# Patient Record
Sex: FEMALE | Race: BLACK OR AFRICAN AMERICAN | Employment: OTHER | URBAN - METROPOLITAN AREA
[De-identification: names, ages, dates, MRNs, and addresses within clinical notes are randomized per-mention and may not be internally consistent; named-entity substitution may affect disease eponyms.]

---

## 2017-01-13 ENCOUNTER — OFFICE VISIT (OUTPATIENT)
Dept: INTERNAL MEDICINE CLINIC | Age: 67
End: 2017-01-13

## 2017-01-13 VITALS
HEART RATE: 61 BPM | WEIGHT: 169 LBS | RESPIRATION RATE: 16 BRPM | SYSTOLIC BLOOD PRESSURE: 128 MMHG | BODY MASS INDEX: 31.91 KG/M2 | OXYGEN SATURATION: 98 % | TEMPERATURE: 97.4 F | HEIGHT: 61 IN | DIASTOLIC BLOOD PRESSURE: 76 MMHG

## 2017-01-13 DIAGNOSIS — M17.12 ARTHRITIS OF KNEE, LEFT: Primary | ICD-10-CM

## 2017-01-13 NOTE — MR AVS SNAPSHOT
Visit Information Date & Time Provider Department Dept. Phone Encounter #  
 1/13/2017 10:00 AM [facility] [phone] [id number] Your Appointments 3/7/2017  1:00 PM  
COMPLETE 40 with 3283 MD Tonio [facility] ([address]) Appt Note: cp  
 [address], [address] [address]  
[address], [address] [place] [address]  
  
    
 6/8/2017  1:30 PM  
Follow Up with Paola Kessler  [facility]) Appt Note: Breast Ca, 6 month f/u  
 [address] [address]  
[phone]  
  
   
 [address] [address] Upcoming Health Maintenance Date Due Hepatitis C Screening 1950 FOBT Q 1 YEAR AGE 50-75 10/5/2001 ZOSTER VACCINE AGE 60> 10/5/2011 GLAUCOMA SCREENING Q2Y 10/5/2016 OSTEOPOROSIS SCREENING (DEXA) 10/5/2016 MEDICARE YEARLY EXAM 10/5/2016 BREAST CANCER SCRN MAMMOGRAM 5/9/2018 Pneumococcal 65+ High/Highest Risk (2 of 2 - PPSV23) 10/4/2018 DTaP/Tdap/Td series (2 - Td) 10/4/2023 Allergies as of 1/13/2017  Review Complete On: 1/13/2017 By: Augustine Perla NP Severity Noted Reaction Type Reactions Penicillins High 05/01/2012    Hives Bactrim [Sulfamethoprim Ds] Low 05/08/2012    Rash Ciprofloxacin (Bulk) Low 05/08/2012    Rash Current Immunizations  Reviewed on 12/8/2016 Name Date Pneumococcal Vaccine (Unspecified Type) 10/4/2013 Td 11/19/2002 Tdap 10/4/2013 Not reviewed this visit You Were Diagnosed With   
  
 Codes Comments Arthritis of knee, left    -  Primary ICD-10-CM: M19.90 ICD-9-CM: 716.96 Vitals BP Pulse Temp Resp Height(growth percentile) Weight(growth percentile)  128/76 (BP 1 Location: Left arm, BP Patient Position: Sitting) 61 97.4 °F (36.3 °C) (Oral) 16 5' 1\" (1.549 m) 169 lb (76.7 kg) SpO2 BMI OB Status Smoking Status 98% 31.93 kg/m2 Hysterectomy Never Smoker Vitals History BMI and BSA Data Body Mass Index Body Surface Area  
 31.93 kg/m 2 1.82 m 2 Preferred Pharmacy Pharmacy Name Phone [address] [place] [address], [facility] [phone] Your Updated Medication List  
  
   
This list is accurate as of: 1/13/17 11:03 AM.  Always use your most recent med list.  
  
  
  
  
 albuterol 90 mcg/actuation inhaler Commonly known as:  PROVENTIL HFA, VENTOLIN HFA, PROAIR HFA Take 2 Puffs by inhalation every six (6) hours as needed for Wheezing or Shortness of Breath (or tightness in chest). amLODIPine-benazepril 5-20 mg per capsule Commonly known as:  [place] Take 1 Cap by mouth daily. aspirin 325 mg tablet Commonly known as:  ASPIRIN Take 1 Tab by mouth daily. atenolol 50 mg tablet Commonly known as:  TENORMIN Take 1 Tab by mouth daily. cholecalciferol (vitamin D3) 2,000 unit Tab Take  by mouth.  
  
 coenzyme q10 200 mg Wafr Take 300 mg by mouth daily. PENNSAID 1.5 % Drop Generic drug:  diclofenac sodium  
by Apply Externally route. pravastatin 80 mg tablet Commonly known as:  PRAVACHOL Take 1 Tab by mouth nightly. VITAMIN C 500 mg tablet Generic drug:  ascorbic acid (vitamin C) Take 500 mg by mouth daily. ZyrTEC 10 mg tablet Generic drug:  cetirizine Take  by mouth. Patient Instructions Add Glucosamine chondroitin pills - try this daily for a month to see if it helps with your arthritic pain Wear the knee brace from time you get up until you get ready to go to bed Space Aleve 2 hrs away from Aspirin 
_____________________________________________________ Knee Arthritis: Exercises Your Care Instructions Here are some examples of exercises for knee arthritis. Start each exercise slowly. Ease off the exercise if you start to have pain. Your doctor or physical therapist will tell you when you can start these exercises and which ones will work best for you. How to do the exercises Knee flexion with heel slide 1. Lie on your back with your knees bent. 2. Slide your heel back by bending your affected knee as far as you can. Then hook your other foot around your ankle to help pull your heel even farther back. 3. Hold for about 6 seconds, then rest for up to 10 seconds. 4. Repeat 8 to 12 times. 5. Switch legs and repeat steps 1 through 4, even if only one knee is sore. [facility] 1. Sit with your affected leg straight and supported on the floor or a firm bed. Place a small, rolled-up towel under your knee. Your other leg should be bent, with that foot flat on the floor. 2. Tighten the thigh muscles of your affected leg by pressing the back of your knee down into the towel. 3. Hold for about 6 seconds, then rest for up to 10 seconds. 4. Repeat 8 to 12 times. 5. Switch legs and repeat steps 1 through 4, even if only one knee is sore. Straight-leg raises to the front 1. Lie on your back with your good knee bent so that your foot rests flat on the floor. Your affected leg should be straight. Make sure that your low back has a normal curve. You should be able to slip your hand in between the floor and the small of your back, with your palm touching the floor and your back touching the back of your hand. 2. Tighten the thigh muscles in your affected leg by pressing the back of your knee flat down to the floor. Hold your knee straight. 3. Keeping the thigh muscles tight and your leg straight, lift your affected leg up so that your heel is about 12 inches off the floor. Hold for about 6 seconds, then lower slowly. 4. Relax for up to 10 seconds between repetitions. 5. Repeat 8 to 12 times. 6. Switch legs and repeat steps 1 through 5, even if only one knee is sore. Active knee flexion 1. Lie on your stomach with your knees straight. If your kneecap is uncomfortable, roll up a washcloth and put it under your leg just above your kneecap. 2. Lift the foot of your affected leg by bending the knee so that you bring the foot up toward your buttock. If this motion hurts, try it without bending your knee quite as far. This may help you avoid any painful motion. 3. Slowly move your leg up and down. 4. Repeat 8 to 12 times. 5. Switch legs and repeat steps 1 through 4, even if only one knee is sore. Quadriceps stretch (facedown) 1. Lie flat on your stomach, and rest your face on the floor. 2. Wrap a towel or belt strap around the lower part of your affected leg. Then use the towel or belt strap to slowly pull your heel toward your buttock until you feel a stretch. 3. Hold for about 15 to 30 seconds, then relax your leg against the towel or belt strap. 4. Repeat 2 to 4 times. 5. Switch legs and repeat steps 1 through 4, even if only one knee is sore. Stationary exercise bike If you do not have a stationary exercise bike at home, you can find one to ride at your local health club or community center. 1. Adjust the height of the bike seat so that your knee is slightly bent when your leg is extended downward. If your knee hurts when the pedal reaches the top, you can raise the seat so that your knee does not bend as much. 2. Start slowly. At first, try to do 5 to 10 minutes of cycling with little to no resistance. Then increase your time and the resistance bit by bit until you can do 20 to 30 minutes without pain. 3. If you start to have pain, rest your knee until your pain gets back to the level that is normal for you. Or cycle for less time or with less effort. Follow-up care is a key part of your treatment and safety.  Be sure to make and go to all appointments, and call your doctor if you are having problems. It's also a good idea to know your test results and keep a list of the medicines you take. Where can you learn more? Go to http://natalie-jeet.info/. Enter C159 in the search box to learn more about \"Knee Arthritis: Exercises. \" Current as of: May 23, 2016 Content Version: 11.1 © 1212-6494 [employer]. Care instructions adapted under license by [employer] (which disclaims liability or warranty for this information). If you have questions about a medical condition or this instruction, always ask your healthcare professional. [address] any warranty or liability for your use of this information. Introducing [facility]! Dear Demetra Matta: 
Thank you for requesting a Curiyo account. Our records indicate that you already have an active Curiyo account. You can access your account anytime at [email or web address]. [email or web address] Did you know that you can access your hospital and ER discharge instructions at any time in Curiyo? You can also review all of your test results from your hospital stay or ER visit. Additional Information If you have questions, please visit the Frequently Asked Questions section of the Curiyo website at [email or web address] Remember, Curiyo is NOT to be used for urgent needs. For medical emergencies, dial 911. Now available from your iPhone and Android! Please provide this summary of care documentation to your next provider. Your primary care clinician is listed as [address]. If you have any questions after today's visit, please call [phone].

## 2017-01-13 NOTE — PATIENT INSTRUCTIONS
Add Glucosamine chondroitin pills - try this daily for a month to see if it helps with your arthritic pain    Wear the knee brace from time you get up until you get ready to go to bed    Space Aleve 2 hrs away from Aspirin  _____________________________________________________     Knee Arthritis: Exercises  Your Care Instructions  Here are some examples of exercises for knee arthritis. Start each exercise slowly. Ease off the exercise if you start to have pain. Your doctor or physical therapist will tell you when you can start these exercises and which ones will work best for you. How to do the exercises  Knee flexion with heel slide    1. Lie on your back with your knees bent. 2. Slide your heel back by bending your affected knee as far as you can. Then hook your other foot around your ankle to help pull your heel even farther back. 3. Hold for about 6 seconds, then rest for up to 10 seconds. 4. Repeat 8 to 12 times. 5. Switch legs and repeat steps 1 through 4, even if only one knee is sore. Quad sets    1. Sit with your affected leg straight and supported on the floor or a firm bed. Place a small, rolled-up towel under your knee. Your other leg should be bent, with that foot flat on the floor. 2. Tighten the thigh muscles of your affected leg by pressing the back of your knee down into the towel. 3. Hold for about 6 seconds, then rest for up to 10 seconds. 4. Repeat 8 to 12 times. 5. Switch legs and repeat steps 1 through 4, even if only one knee is sore. Straight-leg raises to the front    1. Lie on your back with your good knee bent so that your foot rests flat on the floor. Your affected leg should be straight. Make sure that your low back has a normal curve. You should be able to slip your hand in between the floor and the small of your back, with your palm touching the floor and your back touching the back of your hand.   2. Tighten the thigh muscles in your affected leg by pressing the back of your knee flat down to the floor. Hold your knee straight. 3. Keeping the thigh muscles tight and your leg straight, lift your affected leg up so that your heel is about 12 inches off the floor. Hold for about 6 seconds, then lower slowly. 4. Relax for up to 10 seconds between repetitions. 5. Repeat 8 to 12 times. 6. Switch legs and repeat steps 1 through 5, even if only one knee is sore. Active knee flexion    1. Lie on your stomach with your knees straight. If your kneecap is uncomfortable, roll up a washcloth and put it under your leg just above your kneecap. 2. Lift the foot of your affected leg by bending the knee so that you bring the foot up toward your buttock. If this motion hurts, try it without bending your knee quite as far. This may help you avoid any painful motion. 3. Slowly move your leg up and down. 4. Repeat 8 to 12 times. 5. Switch legs and repeat steps 1 through 4, even if only one knee is sore. Quadriceps stretch (facedown)    1. Lie flat on your stomach, and rest your face on the floor. 2. Wrap a towel or belt strap around the lower part of your affected leg. Then use the towel or belt strap to slowly pull your heel toward your buttock until you feel a stretch. 3. Hold for about 15 to 30 seconds, then relax your leg against the towel or belt strap. 4. Repeat 2 to 4 times. 5. Switch legs and repeat steps 1 through 4, even if only one knee is sore. Stationary exercise bike    If you do not have a stationary exercise bike at home, you can find one to ride at your local health club or community center. 1. Adjust the height of the bike seat so that your knee is slightly bent when your leg is extended downward. If your knee hurts when the pedal reaches the top, you can raise the seat so that your knee does not bend as much. 2. Start slowly. At first, try to do 5 to 10 minutes of cycling with little to no resistance.  Then increase your time and the resistance bit by bit until you can do 20 to 30 minutes without pain. 3. If you start to have pain, rest your knee until your pain gets back to the level that is normal for you. Or cycle for less time or with less effort. Follow-up care is a key part of your treatment and safety. Be sure to make and go to all appointments, and call your doctor if you are having problems. It's also a good idea to know your test results and keep a list of the medicines you take. Where can you learn more? Go to http://natalie-jeet.info/. Enter C159 in the search box to learn more about \"Knee Arthritis: Exercises. \"  Current as of: May 23, 2016  Content Version: 11.1  © 8222-4868 [employer]. Care instructions adapted under license by [employer] (which disclaims liability or warranty for this information). If you have questions about a medical condition or this instruction, always ask your healthcare professional. [address] any warranty or liability for your use of this information.

## 2017-01-13 NOTE — PROGRESS NOTES
72 yo female in to discuss her chronic L knee pain which is present 5 days/wk. Sleep is fairly un-interrupted by pain most of the time. She has tried to take Aleve, but has trouble remembering to take it twice a day. She is interested in other things she can do for this knee pain. She would like to have a disabled parking pass renewal.    A L Knee XR was done at [facility] and she took this to Dr. Inder Fung, Orthopedist.  He gave her a Cortisone injection in that knee in 12/2015 with no benefit. He mentioned knee replacement (partial), but she doesn't wish to do this. The elastic knee brace doesn't seem to offer much help, although she didn't leave it on for long. PE: WNWD BF   BP - 128/76   L Knee - crepitus w/ passive ROM; tenderness along medial joint line and along lateral patella    Imp: DJD L Knee     Plan: Suggested Glucosamine   Wear knee brace   Space Aleve away from Aspirin by 2 hours   Educated on chronic use of NSAIDs   Handicapped parking permit   See Dr. Isiah Ortiz as scheduled in March  _________________________________  Expected course of current diagnosed problem(s) as well as expected progression and possible complications, and desired follow up with provider are discussed with patient. Patient is encouraged to be back in touch with any questions or concerns. Patient expresses understanding of plan of care. Patient is given AVS which includes diagnoses, current medications, vitals.

## 2017-03-07 ENCOUNTER — OFFICE VISIT (OUTPATIENT)
Dept: INTERNAL MEDICINE CLINIC | Age: 67
End: 2017-03-07

## 2017-03-07 VITALS
DIASTOLIC BLOOD PRESSURE: 80 MMHG | RESPIRATION RATE: 16 BRPM | WEIGHT: 166 LBS | HEART RATE: 63 BPM | TEMPERATURE: 98.3 F | BODY MASS INDEX: 31.34 KG/M2 | OXYGEN SATURATION: 98 % | HEIGHT: 61 IN | SYSTOLIC BLOOD PRESSURE: 128 MMHG

## 2017-03-07 DIAGNOSIS — I25.10 CORONARY ARTERY DISEASE INVOLVING NATIVE CORONARY ARTERY OF NATIVE HEART WITHOUT ANGINA PECTORIS: ICD-10-CM

## 2017-03-07 DIAGNOSIS — Z71.89 ACP (ADVANCE CARE PLANNING): ICD-10-CM

## 2017-03-07 DIAGNOSIS — E78.00 HYPERCHOLESTEREMIA: ICD-10-CM

## 2017-03-07 DIAGNOSIS — Z11.59 NEED FOR HEPATITIS C SCREENING TEST: ICD-10-CM

## 2017-03-07 DIAGNOSIS — M54.50 LOW BACK PAIN WITHOUT SCIATICA, UNSPECIFIED BACK PAIN LATERALITY, UNSPECIFIED CHRONICITY: ICD-10-CM

## 2017-03-07 DIAGNOSIS — Z78.0 POSTMENOPAUSAL STATUS (AGE-RELATED) (NATURAL): ICD-10-CM

## 2017-03-07 DIAGNOSIS — Z00.00 WELCOME TO MEDICARE PREVENTIVE VISIT: Primary | ICD-10-CM

## 2017-03-07 DIAGNOSIS — Z23 NEED FOR VACCINATION WITH 13-POLYVALENT PNEUMOCOCCAL CONJUGATE VACCINE: ICD-10-CM

## 2017-03-07 DIAGNOSIS — E55.9 VITAMIN D INSUFFICIENCY: ICD-10-CM

## 2017-03-07 LAB
BILIRUB UR QL STRIP: NEGATIVE
GLUCOSE UR-MCNC: NEGATIVE MG/DL
KETONES P FAST UR STRIP-MCNC: NEGATIVE MG/DL
PH UR STRIP: 6 [PH] (ref 4.6–8)
PROT UR QL STRIP: NEGATIVE MG/DL
SP GR UR STRIP: 1.02 (ref 1–1.03)
UA UROBILINOGEN AMB POC: NORMAL (ref 0.2–1)
URINALYSIS CLARITY POC: CLEAR
URINALYSIS COLOR POC: YELLOW
URINE BLOOD POC: NEGATIVE
URINE LEUKOCYTES POC: NORMAL
URINE NITRITES POC: NEGATIVE

## 2017-03-07 RX ORDER — ATENOLOL 50 MG/1
50 TABLET ORAL DAILY
Qty: 90 TAB | Refills: 3 | Status: SHIPPED | OUTPATIENT
Start: 2017-03-07 | End: 2017-05-09 | Stop reason: SDUPTHER

## 2017-03-07 RX ORDER — CALC/MAG/B COMPLEX/D3/HERB 61
15 TABLET ORAL AS NEEDED
COMMUNITY

## 2017-03-07 RX ORDER — AMLODIPINE AND BENAZEPRIL HYDROCHLORIDE 5; 20 MG/1; MG/1
1 CAPSULE ORAL DAILY
Qty: 90 CAP | Refills: 1 | Status: SHIPPED | OUTPATIENT
Start: 2017-03-07 | End: 2017-03-08

## 2017-03-07 NOTE — PROGRESS NOTES
HPI:  Montana Jesus is a 73y.o. year old female who returns to clinic today for an Annual Physical.  She was working at [employer], now at [employer]. Chronic OA left knee. Sees Dr. Kerrie Gautam and offered partial knee replacement. She hopes to avoid this. Came in in Jan to see Frankie Sanders and took a course of Aleve. osteobiflex has helped as well. Hypercholesterolemia - I increased her pravastatin from 40-->80 mg last March. Her back starting hurting a month ago and she attributed this to her cholesterol medication and therefore stopped it, she notes she restarted it for a week to 10 days, and then notes the return of the pain. htn - adherent to current regimen as prescribed. No interim issues with blood pressure. She does not exercise regularly due to knee pain. She had angioplasty at age 56 with no problems since. Has not seen cardiology in many years. heartburn - takes prevacid otc every other day. H2 blockers are not effective. Annual Wellness Visit- IPPE    End of Life Planning: This was discussed with her today and she  has NO advanced directive  - add't info provided. Reviewed DNR/DNI and patient is not interested. Depression Screen:  PHQ 2 / 9, over the last two weeks 3/7/2017   Little interest or pleasure in doing things Not at all   Feeling down, depressed or hopeless Not at all   Total Score PHQ 2 0         Fall Risk:   Fall Risk Assessment, last 12 mths 3/7/2017   Able to walk? Yes   Fall in past 12 months? No       Abuse Screen:  No flowsheet data found. Alcohol Risk Factor Screening: On any occasion during the past 3 months, have you had more than 3 drinks containing alcohol? No  Do you average more than 7 drinks per week? No    Hearing Loss:  denies any hearing loss    Activities of Daily Living:  Self-care. Requires assistance with: no ADLs    Adult Nutrition Screen:  No risk factors noted.       Health Maintenance  Daily Aspirin: yes - on  mg daily  Bone Density: not up to date - ordered initial study today  Glaucoma Screening: UTD - done last May with Dr. Caitlin Burton, records requested. Immunizations:     Influenza: patient declines, reviewed pros/cons to vaccination & recommended it. Tetanus: up to date. Shingles: patient declined previously to previous PCP. Pneumonia: due for Prevnar & script provided. Cancer screening:    Cervical: reviewed guidelines, she reports she does not have a cervix and had regular non cervical pap smears by previous pcp for < 10 years. Breast: reviewed guidelines, up to date, reviewed SBE. Colon: done Lulu 3 2013 (reported in previous pcp record, record from Dr. Jerald Tapia directly requested). Patient Care Team:  Pam Herron MD as PCP - General (Internal Medicine)  Kd Coffman DO as Physician (Oncology)  Erwin Boyd MD as Physician (Breast Surgery)  Joselin Mack MD as Physician (Radiation Oncology)  Toshia Bradford MD (Cardiology)  [facility] as Physician Assistant (Physician Assistant)  Devon Hennessy MD (Breast Surgery)       The following sections were reviewed & updated as appropriate: PMH, PSH, FH, and SH. Patient Active Problem List   Diagnosis Code    Breast cancer, stage 2 ([facility] [address] C50.919    Lymphedema of arm I89.0    S/P mastectomy Z90.10    Chest wall discomfort R07.89    Neuropathy G62.9    Essential hypertension I10    History of hyperthyroidism Z86.39    Hypercholesteremia E78.00    CAD (coronary artery disease), native coronary artery I25.10    Degenerative arthritis of left knee M17.9    History of colonoscopy Z98.890    Pain in right axilla M79.621    Rib pain on right side R07.81    Sacroiliac joint pain M53.3    Left knee pain M25.562    Arthritis of knee, left M19.90          Prior to Admission medications    Medication Sig Start Date End Date Taking?  Authorizing Provider   amLODIPine-benazepril (LOTREL) 5-20 mg per capsule Take 1 Cap by mouth daily. 3/3/17  Yes Ingrid Penn MD   cetirizine (ZYRTEC) 10 mg tablet Take  by mouth. Yes Historical Provider   atenolol (TENORMIN) 50 mg tablet Take 1 Tab by mouth daily. 4/21/16  Yes Ingrid Penn MD   albuterol (PROVENTIL HFA, VENTOLIN HFA, PROAIR HFA) 90 mcg/actuation inhaler Take 2 Puffs by inhalation every six (6) hours as needed for Wheezing or Shortness of Breath (or tightness in chest). 4/11/16  Yes Magui Ott NP   cholecalciferol, vitamin D3, 2,000 unit tab Take  by mouth. Yes Historical Provider   pravastatin (PRAVACHOL) 80 mg tablet Take 1 Tab by mouth nightly. 3/1/16  Yes Ingrid Penn MD   aspirin (ASPIRIN) 325 mg tablet Take 1 Tab by mouth daily. 6/20/12  Yes Jasmin Pena MD          Allergies   Allergen Reactions    Penicillins Hives    Bactrim [Sulfamethoprim Ds] Rash    Ciprofloxacin (Bulk) Rash              Review of Systems   Constitutional: Negative for malaise/fatigue. HENT: Negative for congestion and hearing loss. Eyes: Negative for blurred vision. Respiratory: Negative for cough and shortness of breath. Cardiovascular: Negative for chest pain, palpitations and leg swelling. Gastrointestinal: Positive for heartburn. Negative for abdominal pain, constipation, diarrhea, nausea and vomiting. Genitourinary: Negative for frequency, hematuria and urgency. Musculoskeletal: Positive for back pain (left mid back for 4 days) and joint pain (left knee). Negative for myalgias and neck pain. Neurological: Negative for tingling and sensory change. Endo/Heme/Allergies: Positive for environmental allergies. Psychiatric/Behavioral: Negative for depression. The patient is not nervous/anxious. Physical Exam   Constitutional: She appears well-nourished. Neck: Carotid bruit is not present. Cardiovascular: Normal rate, regular rhythm and normal heart sounds. No murmur heard.   Pulses:       Carotid pulses are 2+ on the right side, and 2+ on the left side. Pulmonary/Chest: Effort normal and breath sounds normal.   Abdominal: Soft. Bowel sounds are normal. There is no hepatosplenomegaly. There is no tenderness. Musculoskeletal: She exhibits no edema. Arms:          Visit Vitals    /80 (BP 1 Location: Left arm, BP Patient Position: Sitting)    Pulse 63    Temp 98.3 °F (36.8 °C) (Oral)    Resp 16    Ht 5' 1\" (1.549 m)    Wt 166 lb (75.3 kg)    SpO2 98%    BMI 31.37 kg/m2     ECG:  NSR with Q wave in leads III and aVF. No comparison on file. Results for orders placed or performed in visit on 03/07/17   AMB POC URINALYSIS DIP STICK AUTO W/O MICRO     Status: None   Result Value Ref Range Status    Color (UA POC) Yellow  Final    Clarity (UA POC) Clear  Final    Glucose (UA POC) Negative Negative Final    Bilirubin (UA POC) Negative Negative Final    Ketones (UA POC) Negative Negative Final    Specific gravity (UA POC) 1.020 1.001 - 1.035 Final    Blood (UA POC) Negative Negative Final    pH (UA POC) 6 4.6 - 8.0 Final    Protein (UA POC) Negative Negative mg/dL Final    Urobilinogen (UA POC) 0.2 mg/dL 0.2 - 1 Final    Nitrites (UA POC) Negative Negative Final    Leukocyte esterase (UA POC) 1+ Negative Final        Assessment & Plan:  Kary Brennan was seen today for complete physical and back pain. Diagnoses and all orders for this visit:    Welcome to Medicare preventive visit  -     AMB POC EKG Screen ([id number]) (Only Orderable in first 12 months of Medicare)  -     CBC W/O DIFF  -     METABOLIC PANEL, COMPREHENSIVE    ACP (advance care planning)    Postmenopausal status (age-related) (natural)  -     DEXA BONE DENSITY STUDY AXIAL; Future    Need for vaccination with 13-polyvalent pneumococcal conjugate vaccine  -     pneumococcal 13 matt conj dip (PREVNAR-13) 0.5 mL syrg injection; 0.5 mL by IntraMUSCular route once for 1 dose.     Need for hepatitis C screening test  -     HEPATITIS C AB    Also, she has additional complaints of low back pain, coronary disease, hypertension, hld, Vit D insufficiency. Low back pain without sciatica, unspecified back pain laterality, unspecified chronicity  Suspect msk in origin. She was concerned about her kidneys so a UA was done prior to appt, reassurance provided in this regard as I do not suspect kidney disease. I am not convinced of the statins relationship to her pain so I suggest she return to the 40 mg dose and reassess her lipids in 2 months. She will call me with any recurrent back problems.     -     AMB POC URINALYSIS DIP STICK AUTO W/O MICRO    Coronary artery disease involving native coronary artery of native heart without angina pectoris  She is clinically doing well and on a good medical regimen. I recommend she remain on a statin, recheck fasting lipids with goal LDL < 100.    -     LIPID PANEL; Future  -     atenolol (TENORMIN) 50 mg tablet; Take 1 Tab by mouth daily. Hypertension  I evaluated and recommended to continue current doses of medications pending lab work. Erika Witt notes there may be an issue with coverage with Lotrel, discussed possibility of needing to separate her medications to individual components. Hypercholesteremia  Recheck fasting lipids in 2 mo after return to regular use of statin at the 40 mg dose    Vitamin D insufficiency  Continue current dose of D pending repeat lab  -     VITAMIN D, 25 HYDROXY        Follow-up Disposition:  Return in about 6 months (around 9/7/2017) for Establish care with new provider for hypertension and coronary disease. Advised her to call back or return to office if symptoms worsen/change/persist.  Discussed expected course/resolution/complications of diagnosis in detail with patient. Medication risks/benefits/costs/interactions/alternatives discussed with patient. She was given an after visit summary which includes diagnoses, current medications, & vitals. She expressed understanding with the diagnosis and plan.

## 2017-03-07 NOTE — MR AVS SNAPSHOT
Visit Information Date & Time Provider Department Dept. Phone Encounter #  
 3/7/2017  1:00 PM Rajeev Dolan MD [place] [address] [facility] [phone] [id number] Follow-up Instructions Return in about 6 months (around 9/7/2017) for Establish care with new provider for hypertension and coronary disease. Your Appointments 6/8/2017  1:30 PM  
Follow Up with Aristides Lyons  [facility] at [facility] Appt Note: Breast Ca, 6 month f/u  
 [address] [address]  
[phone]  
  
   
 [address] [address] Upcoming Health Maintenance Date Due Hepatitis C Screening 1950 COLONOSCOPY 10/5/1969 ZOSTER VACCINE AGE 60> 10/5/2011 GLAUCOMA SCREENING Q2Y 10/5/2016 OSTEOPOROSIS SCREENING (DEXA) 10/5/2016 MEDICARE YEARLY EXAM 10/5/2016 BREAST CANCER SCRN MAMMOGRAM 5/9/2018 Pneumococcal 65+ High/Highest Risk (2 of 2 - PPSV23) 10/4/2018 DTaP/Tdap/Td series (2 - Td) 10/4/2023 Allergies as of 3/7/2017  Review Complete On: 3/7/2017 By: Rajeev Dolan MD  
  
 Severity Noted Reaction Type Reactions Penicillins High 05/01/2012    Hives Bactrim [Sulfamethoprim Ds] Low 05/08/2012    Rash Ciprofloxacin (Bulk) Low 05/08/2012    Rash Current Immunizations  Reviewed on 3/7/2017 Name Date Pneumococcal Polysaccharide (PPSV-23) 10/4/2013 Td 11/19/2002 Tdap 10/4/2013 Reviewed by Rajeev Dolan MD on 3/7/2017 at  1:43 PM  
 Reviewed by Rajeev Dolan MD on 3/7/2017 at  1:43 PM  
 Reviewed by Rajeev Dolan MD on 3/7/2017 at  1:44 PM  
You Were Diagnosed With   
  
 Codes Comments Welcome to Medicare preventive visit    -  Primary ICD-10-CM: Z00.00 ICD-9-CM: V70.0 ACP (advance care planning)     ICD-10-CM: Z71.89 ICD-9-CM: V65.49 Low back pain without sciatica, unspecified back pain laterality, unspecified chronicity     ICD-10-CM: M54.5 ICD-9-CM: 724.2 Postmenopausal status (age-related) (natural)     ICD-10-CM: Z78.0 ICD-9-CM: V49.81 Need for vaccination with 13-polyvalent pneumococcal conjugate vaccine     ICD-10-CM: Q36 ICD-9-CM: V03.82 Need for hepatitis C screening test     ICD-10-CM: Z11.59 
ICD-9-CM: V73.89 Coronary artery disease involving native coronary artery of native heart without angina pectoris     ICD-10-CM: I25.10 ICD-9-CM: 414.01 Vitamin D insufficiency     ICD-10-CM: E55.9 ICD-9-CM: 268.9 Vitals BP Pulse Temp Resp Height(growth percentile) Weight(growth percentile) 128/80 (BP 1 Location: Left arm, BP Patient Position: Sitting) 63 98.3 °F (36.8 °C) (Oral) 16 5' 1\" (1.549 m) 166 lb (75.3 kg) SpO2 BMI OB Status Smoking Status 98% 31.37 kg/m2 Hysterectomy Never Smoker BMI and BSA Data Body Mass Index Body Surface Area  
 31.37 kg/m 2 1.8 m 2 Preferred Pharmacy Pharmacy Name Phone [address] [facility] [phone] Your Updated Medication List  
  
   
This list is accurate as of: 3/7/17  2:24 PM.  Always use your most recent med list.  
  
  
  
  
 albuterol 90 mcg/actuation inhaler Commonly known as:  PROVENTIL HFA, VENTOLIN HFA, PROAIR HFA Take 2 Puffs by inhalation every six (6) hours as needed for Wheezing or Shortness of Breath (or tightness in chest). amLODIPine-benazepril 5-20 mg per capsule Commonly known as:  Gee Pauling Take 1 Cap by mouth daily. aspirin 325 mg tablet Commonly known as:  ASPIRIN Take 1 Tab by mouth daily. atenolol 50 mg tablet Commonly known as:  TENORMIN Take 1 Tab by mouth daily. cholecalciferol (vitamin D3) 2,000 unit Tab Take  by mouth. pneumococcal 13 matt conj dip 0.5 mL Syrg injection Commonly known as:  PREVNAR-13  
0.5 mL by IntraMUSCular route once for 1 dose. pravastatin 80 mg tablet Commonly known as:  PRAVACHOL Take 1 Tab by mouth nightly. PREVACID 15 mg capsule Generic drug:  lansoprazole Take 15 mg by mouth every other day. ZyrTEC 10 mg tablet Generic drug:  cetirizine Take  by mouth. Prescriptions Printed Refills  
 pneumococcal 13 matt conj dip (PREVNAR-13) 0.5 mL syrg injection 0 Si.5 mL by IntraMUSCular route once for 1 dose. Class: Print Route: IntraMUSCular  
 amLODIPine-benazepril (LOTREL) 5-20 mg per capsule 1 Sig: Take 1 Cap by mouth daily. Class: Print Route: Oral  
  
Prescriptions Sent to Pharmacy Refills  
 atenolol (TENORMIN) 50 mg tablet 3 Sig: Take 1 Tab by mouth daily. Class: Normal  
 Pharmacy: [address], [address] Ph #: [phone] Route: Oral  
  
We Performed the Following AMB POC EKG ROUTINE W/ 12 LEADS, SCREEN () [ HCP] AMB POC URINALYSIS DIP STICK AUTO W/O MICRO [47156 CPT(R)] CBC W/O DIFF [04486 CPT(R)] HEPATITIS C AB [07803 CPT(R)] METABOLIC PANEL, COMPREHENSIVE [79341 CPT(R)] VITAMIN D, 25 HYDROXY [id number] CPT(R)] Follow-up Instructions Return in about 6 months (around 2017) for Establish care with new provider for hypertension and coronary disease. To-Do List   
 2017 Imaging:  DEXA BONE DENSITY STUDY AXIAL   
  
 2017 Lab:  LIPID PANEL Introducing [facility]! Dear Kary Anu: 
Thank you for requesting a Awdio account. Our records indicate that you already have an active Awdio account. You can access your account anytime at [email or web address] Did you know that you can access your hospital and ER discharge instructions at any time in Twingly? You can also review all of your test results from your hospital stay or ER visit. Additional Information If you have questions, please visit the Frequently Asked Questions section of the Twingly website at [email or web address] [employer]/. Remember, Twingly is NOT to be used for urgent needs. For medical emergencies, dial 911. Now available from your iPhone and Android! Please provide this summary of care documentation to your next provider. Your primary care clinician is listed as Pam Herron. If you have any questions after today's visit, please call [phone].

## 2017-03-07 NOTE — PROGRESS NOTES
1. Have you been to the ER, urgent care clinic since your last visit? Hospitalized since your last visit? No    2. Have you seen or consulted any other health care providers outside of the [address] [place] [address] since your last visit? Include any pap smears or colon screening.  No  Chief Complaint   Patient presents with    Complete Physical    Back Pain

## 2017-03-08 LAB
25(OH)D3+25(OH)D2 SERPL-MCNC: 31.3 NG/ML (ref 30–100)
ALBUMIN SERPL-MCNC: 3.9 G/DL (ref 3.6–4.8)
ALBUMIN/GLOB SERPL: 1.2 {RATIO} (ref 1.1–2.5)
ALP SERPL-CCNC: 76 IU/L (ref 39–117)
ALT SERPL-CCNC: 6 IU/L (ref 0–32)
AST SERPL-CCNC: 14 IU/L (ref 0–40)
BILIRUB SERPL-MCNC: 0.3 MG/DL (ref 0–1.2)
BUN SERPL-MCNC: 9 MG/DL (ref 8–27)
BUN/CREAT SERPL: 15 (ref 11–26)
CALCIUM SERPL-MCNC: 9.2 MG/DL (ref 8.7–10.3)
CHLORIDE SERPL-SCNC: 100 MMOL/L (ref 96–106)
CO2 SERPL-SCNC: 24 MMOL/L (ref 18–29)
CREAT SERPL-MCNC: 0.61 MG/DL (ref 0.57–1)
ERYTHROCYTE [DISTWIDTH] IN BLOOD BY AUTOMATED COUNT: 14.6 % (ref 12.3–15.4)
GLOBULIN SER CALC-MCNC: 3.3 G/DL (ref 1.5–4.5)
GLUCOSE SERPL-MCNC: 85 MG/DL (ref 65–99)
HCT VFR BLD AUTO: 37.7 % (ref 34–46.6)
HCV AB S/CO SERPL IA: <0.1 S/CO RATIO (ref 0–0.9)
HGB BLD-MCNC: 12 G/DL (ref 11.1–15.9)
MCH RBC QN AUTO: 26.3 PG (ref 26.6–33)
MCHC RBC AUTO-ENTMCNC: 31.8 G/DL (ref 31.5–35.7)
MCV RBC AUTO: 83 FL (ref 79–97)
PLATELET # BLD AUTO: 289 X10E3/UL (ref 150–379)
POTASSIUM SERPL-SCNC: 4.1 MMOL/L (ref 3.5–5.2)
PROT SERPL-MCNC: 7.2 G/DL (ref 6–8.5)
RBC # BLD AUTO: 4.56 X10E6/UL (ref 3.77–5.28)
SODIUM SERPL-SCNC: 141 MMOL/L (ref 134–144)
WBC # BLD AUTO: 6.8 X10E3/UL (ref 3.4–10.8)

## 2017-03-08 RX ORDER — AMLODIPINE BESYLATE 5 MG/1
5 TABLET ORAL DAILY
Qty: 30 TAB | Refills: 11 | Status: SHIPPED | OUTPATIENT
Start: 2017-03-08 | End: 2017-04-05 | Stop reason: SDUPTHER

## 2017-03-08 RX ORDER — BENAZEPRIL HYDROCHLORIDE 20 MG/1
20 TABLET ORAL DAILY
Qty: 30 TAB | Refills: 11 | Status: SHIPPED | OUTPATIENT
Start: 2017-03-08 | End: 2017-04-05 | Stop reason: SDUPTHER

## 2017-03-08 NOTE — TELEPHONE ENCOUNTER
I discussed with her yesterday that the pills will need to be . I will send in the separate components to her pharmacy, if still too expensive - will change the benazepril. Which pharmacy does she want to use and does she want 30 or 90 days?

## 2017-03-08 NOTE — TELEPHONE ENCOUNTER
Pt said her insurance does not cover the generic for lotrel and the brand name is too expensive pt said she is out can you call in something else

## 2017-04-05 RX ORDER — AMLODIPINE BESYLATE 5 MG/1
5 TABLET ORAL DAILY
Qty: 90 TAB | Refills: 3 | Status: SHIPPED | OUTPATIENT
Start: 2017-04-05 | End: 2018-04-05 | Stop reason: SDUPTHER

## 2017-04-05 RX ORDER — BENAZEPRIL HYDROCHLORIDE 20 MG/1
20 TABLET ORAL DAILY
Qty: 90 TAB | Refills: 3 | Status: SHIPPED | OUTPATIENT
Start: 2017-04-05 | End: 2018-04-05 | Stop reason: SDUPTHER

## 2017-04-05 NOTE — TELEPHONE ENCOUNTER
----- Message from [facility] sent at 4/4/2017  5:17 PM EDT -----  Regarding: Dr. Liliam Forrester   Pt requesting a refill on \"amlodipine besylate\" 5mg and \" benazepril\" 20 mg to be sent to [address] Best contact number [phone].

## 2017-04-05 NOTE — TELEPHONE ENCOUNTER
Last office visit 3/7/17  No upcomming appointments on file    Early refill requested due to change in pharmacy

## 2017-05-01 DIAGNOSIS — I25.10 CORONARY ARTERY DISEASE INVOLVING NATIVE CORONARY ARTERY OF NATIVE HEART WITHOUT ANGINA PECTORIS: ICD-10-CM

## 2017-05-06 LAB
CHOLEST SERPL-MCNC: 155 MG/DL (ref 100–199)
HDLC SERPL-MCNC: 53 MG/DL
INTERPRETATION, 910389: NORMAL
LDLC SERPL CALC-MCNC: 93 MG/DL (ref 0–99)
TRIGL SERPL-MCNC: 46 MG/DL (ref 0–149)
VLDLC SERPL CALC-MCNC: 9 MG/DL (ref 5–40)

## 2017-05-09 DIAGNOSIS — E78.00 HYPERCHOLESTEREMIA: ICD-10-CM

## 2017-05-09 DIAGNOSIS — I25.10 CORONARY ARTERY DISEASE INVOLVING NATIVE CORONARY ARTERY OF NATIVE HEART WITHOUT ANGINA PECTORIS: ICD-10-CM

## 2017-05-09 RX ORDER — PRAVASTATIN SODIUM 80 MG/1
TABLET ORAL
Qty: 90 TAB | Refills: 2 | Status: SHIPPED | OUTPATIENT
Start: 2017-05-09 | End: 2018-04-05 | Stop reason: SDUPTHER

## 2017-05-09 RX ORDER — ATENOLOL 50 MG/1
TABLET ORAL
Qty: 90 TAB | Refills: 2 | Status: SHIPPED | OUTPATIENT
Start: 2017-05-09 | End: 2018-04-05 | Stop reason: SDUPTHER

## 2017-05-15 ENCOUNTER — HOSPITAL ENCOUNTER (OUTPATIENT)
Dept: MAMMOGRAPHY | Age: 67
Discharge: HOME | End: 2017-05-15
Attending: INTERNAL MEDICINE
Payer: MEDICARE

## 2017-05-15 DIAGNOSIS — Z12.31 VISIT FOR SCREENING MAMMOGRAM: ICD-10-CM

## 2017-05-15 PROCEDURE — 77067 SCR MAMMO BI INCL CAD: CPT

## 2017-07-07 ENCOUNTER — DOCUMENTATION ONLY (OUTPATIENT)
Dept: ONCOLOGY | Age: 67
End: 2017-07-07

## 2017-07-07 ENCOUNTER — OFFICE VISIT (OUTPATIENT)
Dept: ONCOLOGY | Age: 67
End: 2017-07-07

## 2017-07-07 VITALS
SYSTOLIC BLOOD PRESSURE: 130 MMHG | HEART RATE: 65 BPM | OXYGEN SATURATION: 98 % | BODY MASS INDEX: 31.34 KG/M2 | WEIGHT: 166 LBS | TEMPERATURE: 98.6 F | DIASTOLIC BLOOD PRESSURE: 78 MMHG | HEIGHT: 61 IN | RESPIRATION RATE: 16 BRPM

## 2017-07-07 DIAGNOSIS — M17.12 OSTEOARTHRITIS OF LEFT KNEE, UNSPECIFIED OSTEOARTHRITIS TYPE: ICD-10-CM

## 2017-07-07 DIAGNOSIS — Z90.11 S/P MASTECTOMY, RIGHT: ICD-10-CM

## 2017-07-07 DIAGNOSIS — R07.89 CHEST WALL DISCOMFORT: ICD-10-CM

## 2017-07-07 DIAGNOSIS — I10 ESSENTIAL HYPERTENSION: ICD-10-CM

## 2017-07-07 DIAGNOSIS — C50.911 BREAST CANCER, STAGE 2, RIGHT (HCC): Primary | ICD-10-CM

## 2017-07-07 NOTE — PROGRESS NOTES
Michelle Urrutia is a 73 y.o. 1950 female and presents with Breast Cancer    CC  Breast cancer Dx 5/12    STAGE:  2  T2N1a    ER- IL-Her2 amplified, surgical path her2 neg   mammoprint triple neg    TREATMENT COURSE: right mast 6/14/12. TC 6 done. XRT done 2/13. HPI: Pt here for breast cancer 6 mo f/u for triple neg disease not on any therapy. She continues to do well overall.    mammo good 5/17  Sees PCP and has labs done there. Pt is retiring soon. Has a new grand baby. Had dexa ordered by PCP but not done yet      DX   Encounter Diagnoses   Name Primary?  Breast cancer, stage 2, right Yes    S/P mastectomy, right     Essential hypertension     Osteoarthritis of left knee, unspecified osteoarthritis type     Chest wall discomfort       Past Medical History:   Diagnosis Date    Arthritis     toe, back    Asthma     as a teenager    Breast cancer ([facility] [address]     Right Breast @ age 62 (2012)    Cancer [facility])      right breast cancer mastectomy    GERD (gastroesophageal reflux disease)     Hypertension     S/P radiotherapy     Right Breast 2012    Thyroid disease     did take meds but now off     Past Surgical History:   Procedure Laterality Date    BREAST SURGERY PROCEDURE UNLISTED  6/14/12    RIGHT BREAST MASTECTOMY WITH RIGHT SENTINEL NODE BIOPSY, PORT A CATH INSERTION WITH ULTRASOUND; INFUSAPORT INSERTION; SENTINEL NODE BIOPSY.     CARDIAC SURG PROCEDURE UNLIST      attempted PTCA    HX BREAST BIOPSY  5/1/12    RIGHT breast biopsy - POSITIVE    HX BREAST BIOPSY  5/22/12    LEFT breast biopsy - BENIGN    HX MASTECTOMY Right     June 2012 w/ Chemo & Radiation    HX ORTHOPAEDIC      4th toe bilaterally    HX PARTIAL HYSTERECTOMY  1983    prolapsed uterus    HX VASCULAR ACCESS      portacath     Social History     Social History    Marital status: SINGLE     Spouse name: N/A    Number of children: N/A    Years of education: N/A     Social History Main Topics    Smoking status: Never Smoker    Smokeless tobacco: Never Used    Alcohol use No    Drug use: No    Sexual activity: Not Currently     Partners: Male     Other Topics Concern    None     Social History Narrative     Family History   Problem Relation Age of Onset    Diabetes Mother     Cancer Father      prostate    Cancer Maternal Aunt      breast cancer; mastectomy, now age 76    Breast Cancer Maternal Aunt      42's    Breast Cancer Maternal Aunt      60's       Current Outpatient Prescriptions   Medication Sig Dispense Refill    pravastatin (PRAVACHOL) 80 mg tablet TAKE 1 TABLET NIGHTLY 90 Tab 2    atenolol (TENORMIN) 50 mg tablet TAKE 1 TABLET DAILY 90 Tab 2    amLODIPine (NORVASC) 5 mg tablet Take 1 Tab by mouth daily. 90 Tab 3    benazepril (LOTENSIN) 20 mg tablet Take 1 Tab by mouth daily. 90 Tab 3    lansoprazole (PREVACID) 15 mg capsule Take 15 mg by mouth every other day.  cetirizine (ZYRTEC) 10 mg tablet Take  by mouth.  albuterol (PROVENTIL HFA, VENTOLIN HFA, PROAIR HFA) 90 mcg/actuation inhaler Take 2 Puffs by inhalation every six (6) hours as needed for Wheezing or Shortness of Breath (or tightness in chest). 1 Inhaler 0    cholecalciferol, vitamin D3, 2,000 unit tab Take  by mouth.  aspirin (ASPIRIN) 325 mg tablet Take 1 Tab by mouth daily. 1 Tab 0       Allergies   Allergen Reactions    Penicillins Hives    Bactrim [Sulfamethoprim Ds] Rash    Ciprofloxacin (Bulk) Rash       Review of Systems    A comprehensive review of systems was negative except for what is discussed in HPI.      Objective:  Visit Vitals    /78    Pulse 65    Temp 98.6 °F (37 °C) (Oral)    Resp 16    Ht 5' 1\" (1.549 m)    Wt 166 lb (75.3 kg)    SpO2 98%    BMI 31.37 kg/m2         Physical Exam:   General appearance - alert, well appearing, and in no distress, oriented to person, place, and time and normal appearing weight  Mental status - alert, oriented to person, place, and time, normal mood, behavior, speech, dress, motor activity, and thought processes. HEENT conj clear  Neck supple  CV- regular  resp  CTA bilat, no wheezes, rales or rhonchi. GI soft, nontender  Ext- no edema noted, skin warm and dry  Neuro -alert, oriented, normal speech, no focal findings or movement disorder noted  Breast:  right mast with XRT changes, left breast with no palpable masses   Skin small sub cm superficial ? Cyst left axilla/ chest wall area, slightly tender      Diagnostic Imaging     reviewed    ICD Codes / Adm. Diagnosis: 174.9 / Malignant neoplasm of breast (   Examination: MR BREAST BILAT Jhon Martin CON CAD EX - [id number] - May 14 2012   1:18PM  Accession No: [id number]  Reason: rt breast ca      REPORT:  HISTORY: 81-year-old female with newly diagnosed invasive ductal carcinoma   (ER/MD negative) right upper outer quadrant. EXAM: BILATERAL BREAST MRI WITH AND WITHOUT CONTRAST. CONTRAST: 15 mL Magnevist.     ANESTHESIA: None. PROCEDURE: Bilateral high resolution dynamic and morphologic enhanced breast   MRI was performed using a dedicated breast coil in the prone position. Pre-   and postcontrast axial and sagittal T1 and STIR images were performed. These   were post-processed using CAD kinetic analysis, digital subtraction,   enhancement curves and 2D and 3D reconstructions. COMPARISON: Recent mammography and ultrasonography. Eura Tamar FINDINGS: Background parenchymal enhancement is mild. RIGHT BREAST: In the right upper outer quadrant, at 10:00, at the site of   recently diagnosed invasive ductal carcinoma, there is masslike delayed and   dynamic washout enhancement irregular in configuration in the mid coronal   third of the breast, measuring 1.9 x 1.7 x 1.6 cm in overall size, with a   central biopsy clip. 1.2 cm anterior to this mass is a 7 mm lymph node which   may be involved , versus a daughter nodule.  . In the right axilla, there are   suspicious lymph nodes with heterogeneous enhancement and abnormal arias architecture, with largely obliterated fatty ousmane. The largest of these   measures 2.2 cm and the other two 1.3 cm and 1.1 cm respectively. The latter   node is located posterior to the pectoralis minor. LEFT BREAST: At 12:00 in the left breast, there is linear clumped   enhancement in the mid coronal third, measuring 2.0 cm in AP diameter, 0.8   cm in transverse diameter and 0.5 cm in cephalocaudad extent. It shows   delayed and dynamic washout enhancement. Lymph nodes in the left axilla are   indeterminate. The largest measures 1.5 cm. IMPRESSION:  1. Right BI-RADS 6, known malignancy. Left BIRADS 4, suspicious. 2. RIGHT BREAST: Dominant masslike enhancement at 10:00 corresponding to   recently diagnosed invasive ductal carcinoma. There is a lymph node or   daughter module 1.2 cm anterior to the primary mass. MRI-biopsy could be   performed if treatment would be affected. 3. RIGHT AXILLA: Suspicious lymph nodes in the right axilla. Ultrasound   biopsy could be attempted preoperatively if desired. 4. LEFT BREAST: Suspicious masslike linear clumped enhancement at 12:00, for   which MRI guided biopsy is recommended to exclude contralateral disease. 5. LEFT AXILLA: Indeterminate lymph nodes. 6. A summary portfolio has been created for reference and is available in   PACS. A negative breast MRI examination has high sensitivity and moderate   specificity, and speaks strongly against invasive cancer down to a detection   threshold of 4-5 mm, but may not detect some lower grade or in situ   carcinomas. Therefore, MRI should not be used to avoid an o/w indicated   biopsy. Due to the prone positioning for this exam, the described location   of MR findings may differ from other studies. Routine clinical and   mammographic followup are recommended. When available, pathology followup is   appreciated.          Signing/Reading Doctor: Lane Choi ([id number])   Approved: Lane Choi ([id number]) 05/15/2012       Results from Hospital Encounter encounter on 11/25/14   MRI BREAST BILINES HERRING CON CAD EX   Narrative **Final Report**      ICD Codes / Adm. Diagnosis: 174.9  V45.71 / Malignant neoplasm of breast (    Examination:  MR BREAST GILL Johnson CON CAD EX  - [id number] - Nov 25 2014    2:27PM  Accession No:  [id number]  Reason:  breast cancer      REPORT:  HISTORY: Triple negative, stage II right breast carcinoma, post mastectomy   6/14/2012. Right axillary pain at mastectomy scar. COMPARISON: 5/14/12. Chest CT 11/1/2013. TECHNIQUE:  Bilateral breast MRI was performed using a dedicated breast coil without   compression with the patient in the prone position. Precontrast T1-weighted   images with fat suppression were obtained followed by bolus injection of 15   mL Magnevist. Postcontrast dynamic and high-resolution images were acquired. T2-weighted axial imaging with fat suppression was also performed. The   images were analyzed using CAD analysis, enhancement curves, digital   subtraction, and 2 and 3 dimensional reconstructions. FINDINGS:  There is mild background parenchymal enhancement and almost entirely fat   (amount of fibroglandular tissue). Patchy areas of airspace disease in the   dependent (anterior) portions of the anterior upper lungs bilaterally, right   greater than left. On CT, this right-sided airspace disease was better seen   to represent radiation changes/fibrosis. The left likely merely represents   atelectasis from positioning. Right breast:  Surgically absent. No abnormal enhancement in the right chest wall or   axilla. Delayed enhancement in the axilla extending between the pectoralis   minor and chest wall and to the lateral edges of the pectoralis major and   minor is expected and consistent with scarring. It extends to the level of   the right axillary vein, making brachial plexus involvement possible. No   axillary or internal mammary lymphadenopathy.     Left breast:  No suspicious enhancing lesions. A biopsy clip is in place at 12:00 at the   junction of the middle and posterior thirds. Adjacent the biopsy clip, there   is a linear area of enhancement extending approximately 13 mm. However, this   has decreased slightly from 2012, and now shows persistent kinetics (as   opposed to washout in 2012, pre-biopsy). Minimal scarring and magnetic   susceptibility (old hemorrhage) along the tract of the removed catheter port   is best noted on sagittal images. Three left axillary lymph nodes are top   normal in size, however, the largest is stable and the smaller two have   decreased minimally. IMPRESSION:   1. No suspicious enhancing lesions in the left breast.  2. Surgically absent right breast. No lymphadenopathy or abnormal chest wall   enhancement. 3. Expected right axillary scar tissue. See discussion above. 4. Mild anterior right upper and middle lobe radiation fibrosis. ASSESSMENT: BI-RADS Assessment Category 2: Benign finding. RECOMMENDATION: Annual screening mammography. A negative breast MRI examination speaks strongly against invasive cancer   down to a detection threshold of 3 to 5 mm but may not detect some lower   grade or in situ carcinomas. Therefore, routine clinical and mammographic   followup are recommended. A summary portfolio has been created in PACS. Signing/Reading Doctor: Vik Wilkins ([id number])    Approved: Vik Wilkins ([id number])  Nov 25 2014  6:31PM                                 Results from Hospital Encounter encounter on 05/15/17   [facility] MAMMO LT SCREENING INCL CAD   Narrative STUDY: Left unilateral digital screening mammogram    INDICATION:  Screening. COMPARISON:  Prior studies dating back to 2012    BREAST COMPOSITION:  There are scattered areas of fibroglandular density.     FINDINGS: Left unilateral digital screening mammography was performed and is  interpreted in conjunction with a computer assisted detection (CAD) system. There is a single biopsy clip in the left breast. No suspicious masses or  calcifications are identified. There has been no significant change. Impression IMPRESSION:  BI-RADS 1: Negative. No mammographic evidence of malignancy. RECOMMENDATIONS:  Next screening mammogram is recommended in one year. The patient will be notified of these results. Lab Results  Per PCP      Lab Results   Component Value Date/Time    WBC 6.8 03/07/2017 02:30 PM       Lab Results   Component Value Date/Time    Sodium 141 03/07/2017 02:30 PM     Assessment/Plan:     Helena Rizzo is a 62 y.o. 1950 female and presents with Breast Cancer    1. Stage 2 triple neg Breast cancer s/p mast/ chest wall XRT. Mammogram 5/9/17 with no evidence of recurrent disease. Has a small ? Superficial cyst on left axilla/ chest wall. Will have patient see breast surgery for this/ ? Removal.     [facility]. Continues to do well clinically. Labs per PCP. 2.  Superficial left axilla/ chest wall ? Cyst.  Surgery eval for removal if needed. 3.  HTN per PCP. 4.  Bone health. dexa ordered by PCP and pending. Follow up in 12 months, sooner if needed. Instructed to call with any questions or concerns. ICD-10-CM ICD-9-CM    1. Breast cancer, stage 2, right C50.911 174.9 REFERRAL TO BREAST SURGERY   2. S/P mastectomy, right Z90.11 V45.71 REFERRAL TO BREAST SURGERY   3. Essential hypertension I10 401.9 REFERRAL TO BREAST SURGERY   4. Osteoarthritis of left knee, unspecified osteoarthritis type M17.12 715.96 REFERRAL TO BREAST SURGERY   5.  Chest wall discomfort R07.89 786.52 REFERRAL TO BREAST SURGERY     Orders Placed This Encounter    REFERRAL TO BREAST SURGERY     Referral Priority:   Routine     Referral Type:   Consultation     Referral Reason:   Specialty Services Required     Referred to Provider:   Sumi Garza MD     Requested Specialty:   Breast Surgery       continue present plan, call if any problems  There are no Patient Instructions on file for this visit. Follow-up Disposition:  Return in about 1 year (around 7/7/2018).     Gio James,

## 2017-07-07 NOTE — PROGRESS NOTES
Per provider, patient has referral to breast surgery, Dr. Monty Valentin. Patient states she will schedule own appointment, no assist needed at this time.

## 2017-09-01 ENCOUNTER — OFFICE VISIT (OUTPATIENT)
Dept: INTERNAL MEDICINE CLINIC | Age: 67
End: 2017-09-01

## 2017-09-01 VITALS
HEIGHT: 62 IN | RESPIRATION RATE: 15 BRPM | BODY MASS INDEX: 30.55 KG/M2 | DIASTOLIC BLOOD PRESSURE: 82 MMHG | HEART RATE: 67 BPM | OXYGEN SATURATION: 98 % | TEMPERATURE: 97.6 F | WEIGHT: 166 LBS | SYSTOLIC BLOOD PRESSURE: 122 MMHG

## 2017-09-01 DIAGNOSIS — I10 ESSENTIAL HYPERTENSION: ICD-10-CM

## 2017-09-01 DIAGNOSIS — M54.2 NECK PAIN: Primary | ICD-10-CM

## 2017-09-01 DIAGNOSIS — E78.5 HYPERLIPIDEMIA, UNSPECIFIED HYPERLIPIDEMIA TYPE: ICD-10-CM

## 2017-09-01 DIAGNOSIS — M17.12 PRIMARY OSTEOARTHRITIS OF LEFT KNEE: ICD-10-CM

## 2017-09-01 DIAGNOSIS — Z00.00 ROUTINE GENERAL MEDICAL EXAMINATION AT A HEALTH CARE FACILITY: ICD-10-CM

## 2017-09-01 DIAGNOSIS — Z85.3 HISTORY OF BREAST CANCER: ICD-10-CM

## 2017-09-01 PROBLEM — Z92.89 H/O MAMMOGRAM: Status: ACTIVE | Noted: 2017-09-01

## 2017-09-01 RX ORDER — DIAZEPAM 2 MG/1
5 TABLET ORAL
Qty: 15 TAB | Refills: 0 | Status: SHIPPED | OUTPATIENT
Start: 2017-09-01 | End: 2017-09-06

## 2017-09-01 RX ORDER — IBUPROFEN 800 MG/1
800 TABLET ORAL
Qty: 21 TAB | Refills: 0 | Status: SHIPPED | OUTPATIENT
Start: 2017-09-01 | End: 2018-01-22

## 2017-09-01 RX ORDER — IBUPROFEN 800 MG/1
TABLET ORAL
Refills: 0 | COMMUNITY
Start: 2017-08-26 | End: 2017-09-01 | Stop reason: SDUPTHER

## 2017-09-01 RX ORDER — CYCLOBENZAPRINE HCL 10 MG
10 TABLET ORAL
Qty: 21 TAB | Refills: 0 | Status: SHIPPED | OUTPATIENT
Start: 2017-09-01 | End: 2017-11-06

## 2017-09-01 RX ORDER — CYCLOBENZAPRINE HCL 10 MG
TABLET ORAL
Refills: 0 | COMMUNITY
Start: 2017-08-26 | End: 2017-09-01

## 2017-09-01 NOTE — PROGRESS NOTES
Chief Complaint   Patient presents with   [facility] Establish Care     Previous [facility] patient, to establish care.  Stiff Neck     Pt c/o cracking and limited movement x2 days     1. Have you been to the ER, urgent care clinic since your last visit? Hospitalized since your last visit? [facility] on 8/26/17 for neck spasms    2. Have you seen or consulted any other health care providers outside of the [address] [place] [address] since your last visit? Include any pap smears or colon screening.  No

## 2017-09-01 NOTE — MR AVS SNAPSHOT
Visit Information Date & Time Provider Department Dept. Phone Encounter #  
 9/1/2017  9:30 AM [place] [id number] [facility] [phone] [id number] Follow-up Instructions Return in about 6 months (around 3/1/2018) for [facility] wellness and CPE. Your Appointments 7/12/2018  9:30 AM  
Follow Up with Lam Hugo DO [facility] at [facility]) Appt Note: 1 year follow up, breast ca [address] [id number]  
[phone]  
  
   
 [address] [id number] Upcoming Health Maintenance Date Due ZOSTER VACCINE AGE 60> 8/5/2011 GLAUCOMA SCREENING Q2Y 10/5/2016 OSTEOPOROSIS SCREENING (DEXA) 10/5/2016 Pneumococcal 65+ High/Highest Risk (1 of 2 - PCV13) 10/5/2016 INFLUENZA AGE 9 TO ADULT 10/10/2017* MEDICARE YEARLY EXAM 3/8/2018 COLONOSCOPY 6/3/2018 BREAST CANCER SCRN MAMMOGRAM 5/15/2019 DTaP/Tdap/Td series (2 - Td) 10/4/2023 *Topic was postponed. The date shown is not the original due date. Allergies as of 9/1/2017  Review Complete On: 9/1/2017 By: Dominick Hargrove NP Severity Noted Reaction Type Reactions Penicillins High 05/01/2012    Hives Bactrim [Sulfamethoprim Ds] Low 05/08/2012    Rash Ciprofloxacin (Bulk) Low 05/08/2012    Rash Current Immunizations  Reviewed on 7/7/2017 Name Date Pneumococcal Polysaccharide (PPSV-23) 10/4/2013 Td 11/19/2002 Tdap 10/4/2013 Not reviewed this visit You Were Diagnosed With   
  
 Codes Comments Neck pain    -  Primary ICD-10-CM: M54.2 ICD-9-CM: 723.1 Routine general medical examination at a health care facility     ICD-10-CM: Z00.00 ICD-9-CM: V70.0 Vitals BP Pulse Temp Resp Height(growth percentile) Weight(growth percentile)  122/82 (BP 1 Location: Left arm, BP Patient Position: Sitting) 67 97.6 °F (36.4 °C) (Oral) 15 5' 2\" (1.575 m) 166 lb (75.3 kg) SpO2 BMI OB Status Smoking Status 98% 30.36 kg/m2 Hysterectomy Never Smoker BMI and BSA Data Body Mass Index Body Surface Area  
 30.36 kg/m 2 1.81 m 2 Preferred Pharmacy Pharmacy Name Phone [address] [facility] [phone] Your Updated Medication List  
  
   
This list is accurate as of: 9/1/17 10:31 AM.  Always use your most recent med list. amLODIPine 5 mg tablet Commonly known as:  [place] Take 1 Tab by mouth daily. aspirin 325 mg tablet Commonly known as:  ASPIRIN Take 1 Tab by mouth daily. atenolol 50 mg tablet Commonly known as:  TENORMIN  
TAKE 1 TABLET DAILY  
  
 benazepril 20 mg tablet Commonly known as:  LOTENSIN Take 1 Tab by mouth daily. cholecalciferol (vitamin D3) 2,000 unit Tab Take  by mouth. cyclobenzaprine 10 mg tablet Commonly known as:  FLEXERIL Take 1 Tab by mouth three (3) times daily as needed for Muscle Spasm(s). Indications: MUSCLE SPASM  
  
 diazePAM 2 mg tablet Commonly known as:  VALIUM Take 2.5 Tabs by mouth every eight (8) hours as needed for Anxiety for up to 5 days. Max Daily Amount: 15 mg. Indications: MUSCLE SPASM  
  
 ibuprofen 800 mg tablet Commonly known as:  MOTRIN Take 1 Tab by mouth every eight (8) hours as needed for Pain. Indications: Pain  
  
 pravastatin 80 mg tablet Commonly known as:  PRAVACHOL  
TAKE 1 TABLET NIGHTLY  
  
 PREVACID 15 mg capsule Generic drug:  lansoprazole Take 15 mg by mouth as needed. ZyrTEC 10 mg tablet Generic drug:  cetirizine Take  by mouth. Prescriptions Printed Refills  
 diazePAM (VALIUM) 2 mg tablet 0 Sig: Take 2.5 Tabs by mouth every eight (8) hours as needed for Anxiety for up to 5 days. Max Daily Amount: 15 mg. Indications: MUSCLE SPASM Class: Print  Route: Oral  
 cyclobenzaprine (FLEXERIL) 10 mg tablet 0 Sig: Take 1 Tab by mouth three (3) times daily as needed for Muscle Spasm(s). Indications: MUSCLE SPASM Class: Print Route: Oral  
 ibuprofen (MOTRIN) 800 mg tablet 0 Sig: Take 1 Tab by mouth every eight (8) hours as needed for Pain. Indications: Pain Class: Print Route: Oral  
  
Follow-up Instructions Return in about 6 months (around 3/1/2018) for [facility] wellness and CPE. To-Do List   
 09/12/2017 Imaging:  DEXA BONE DENSITY STUDY AXIAL   
  
 09/12/2017 Imaging:  XR SPINE CERV 4 OR 5 V Referral Information Referral ID Referred By Referred To  
  
 [id number] Saran MARTE Not Available Visits Status Start Date End Date 1 New Request 9/1/17 9/1/18 If your referral has a status of pending review or denied, additional information will be sent to support the outcome of this decision. Patient Instructions 1. Get diazepam filled, take every 8 hours as needed for neck pain/muscle spams 2. If no improvement with the diazepam, then switch to flexeril. Do NOT take both of these at the same time 3. Take ibuprofen 800mg 3x/day for 5-7 days 4. Heat as needed 5. Schedule your XRay (of cervical spine) and your DEXA (Bone Density) Neck Pain: Care Instructions Your Care Instructions You can have neck pain anywhere from the bottom of your head to the top of your shoulders. It can spread to the upper back or arms. Injuries, painting a ceiling, sleeping with your neck twisted, staying in one position for too long, and many other activities can cause neck pain. Most neck pain gets better with home care. Your doctor may recommend medicine to relieve pain or relax your muscles. He or she may suggest exercise and physical therapy to increase flexibility and relieve stress. You may need to wear a special (cervical) collar to support your neck for a day or two. Follow-up care is a key part of your treatment and safety. Be sure to make and go to all appointments, and call your doctor if you are having problems. It's also a good idea to know your test results and keep a list of the medicines you take. How can you care for yourself at home? · Try using a heating pad on a low or medium setting for 15 to 20 minutes every 2 or 3 hours. Try a warm shower in place of one session with the heating pad. · You can also try an ice pack for 10 to 15 minutes every 2 to 3 hours. Put a thin cloth between the ice and your skin. · Take pain medicines exactly as directed. ¨ If the doctor gave you a prescription medicine for pain, take it as prescribed. ¨ If you are not taking a prescription pain medicine, ask your doctor if you can take an over-the-counter medicine. · If your doctor recommends a cervical collar, wear it exactly as directed. When should you call for help? Call your doctor now or seek immediate medical care if: 
· You have new or worsening numbness in your arms, buttocks or legs. · You have new or worsening weakness in your arms or legs. (This could make it hard to stand up.) · You lose control of your bladder or bowels. Watch closely for changes in your health, and be sure to contact your doctor if: 
· Your neck pain is getting worse. · You are not getting better after 1 week. · You do not get better as expected. Where can you learn more? Go to http://natalie-jeet.info/. Enter [id number] in the search box to learn more about \"Neck Pain: Care Instructions. \" Current as of: March 21, 2017 Content Version: 11.3 © 1857-7003 Cubicle. Care instructions adapted under license by [employer] (which disclaims liability or warranty for this information).  If you have questions about a medical condition or this instruction, always ask your healthcare professional. [employer] disclaims any warranty or liability for your use of this information. Neck: Exercises Your Care Instructions Here are some examples of typical rehabilitation exercises for your condition. Start each exercise slowly. Ease off the exercise if you start to have pain. Your doctor or physical therapist will tell you when you can start these exercises and which ones will work best for you. How to do the exercises Note: Stretching should make you feel a gentle stretch, but no pain. Stop any strengthening exercise that makes pain worse. Neck stretch 1. This stretch works best if you keep your shoulder down as you lean away from it. To help you remember to do this, start by relaxing your shoulders and lightly holding on to your thighs or your chair. 2. Tilt your head toward your shoulder and hold for 15 to 30 seconds. Let the weight of your head stretch your muscles. 3. If you would like a little added stretch, use your hand to gently and steadily pull your head toward your shoulder. For example, keeping your right shoulder down, lean your head to the left. 4. Repeat 2 to 4 times toward each shoulder. Diagonal neck stretch 1. Turn your head slightly toward the direction you will be stretching, and tilt your head diagonally toward your chest and hold for 15 to 30 seconds. 2. If you would like a little added stretch, use your hand to gently and steadily pull your head forward on the diagonal. 
3. Repeat 2 to 4 times toward each side. Dorsal glide stretch 1. Sit or stand tall and look straight ahead. 2. Slowly tuck your chin as you glide your head backward over your body 3. Hold for a count of 6, and then relax for up to 10 seconds. 4. Repeat 8 to 12 times. Note: The dorsal glide stretches the back of the neck. If you feel pain, do not glide so far back. Some people find this exercise easier to do while lying on their backs with an ice pack on the neck. Chest and shoulder stretch 1. Sit or stand tall and glide your head backward as in the dorsal glide stretch. 2. Raise both arms so that your hands are next to your ears. 3. Take a deep breath, and as you breathe out, lower your elbows down and behind your back. You will feel your shoulder blades slide down and together, and at the same time you will feel a stretch across your chest and the front of your shoulders. 4. Hold for about 6 seconds, and then relax for up to 10 seconds. 5. Repeat 8 to 12 times. Strengthening: Hands on head 1. Move your head backward, forward, and side to side against gentle pressure from your hands, holding each position for about 6 seconds. 2. Repeat 8 to 12 times. Follow-up care is a key part of your treatment and safety. Be sure to make and go to all appointments, and call your doctor if you are having problems. It's also a good idea to know your test results and keep a list of the medicines you take. Where can you learn more? Go to http://natalie-jeet.info/. Enter P975 in the search box to learn more about \"Neck: Exercises. \" Current as of: March 21, 2017 Content Version: 11.3 © 5316-9540 Silversky. Care instructions adapted under license by [employer] (which disclaims liability or warranty for this information). If you have questions about a medical condition or this instruction, always ask your healthcare professional. [place] [address] any warranty or liability for your use of this information. Neck: Exercises Your Care Instructions Here are some examples of typical rehabilitation exercises for your condition. Start each exercise slowly. Ease off the exercise if you start to have pain. Your doctor or physical therapist will tell you when you can start these exercises and which ones will work best for you. How to do the exercises Note: Stretching should make you feel a gentle stretch, but no pain.  Stop any strengthening exercise that makes pain worse. Neck stretch 5. This stretch works best if you keep your shoulder down as you lean away from it. To help you remember to do this, start by relaxing your shoulders and lightly holding on to your thighs or your chair. 6. Tilt your head toward your shoulder and hold for 15 to 30 seconds. Let the weight of your head stretch your muscles. 7. If you would like a little added stretch, use your hand to gently and steadily pull your head toward your shoulder. For example, keeping your right shoulder down, lean your head to the left. 8. Repeat 2 to 4 times toward each shoulder. Diagonal neck stretch 4. Turn your head slightly toward the direction you will be stretching, and tilt your head diagonally toward your chest and hold for 15 to 30 seconds. 5. If you would like a little added stretch, use your hand to gently and steadily pull your head forward on the diagonal. 
6. Repeat 2 to 4 times toward each side. Dorsal glide stretch 5. Sit or stand tall and look straight ahead. 6. Slowly tuck your chin as you glide your head backward over your body 7. Hold for a count of 6, and then relax for up to 10 seconds. 8. Repeat 8 to 12 times. Note: The dorsal glide stretches the back of the neck. If you feel pain, do not glide so far back. Some people find this exercise easier to do while lying on their backs with an ice pack on the neck. Chest and shoulder stretch 6. Sit or stand tall and glide your head backward as in the dorsal glide stretch. 7. Raise both arms so that your hands are next to your ears. 8. Take a deep breath, and as you breathe out, lower your elbows down and behind your back. You will feel your shoulder blades slide down and together, and at the same time you will feel a stretch across your chest and the front of your shoulders. 9. Hold for about 6 seconds, and then relax for up to 10 seconds. 10. Repeat 8 to 12 times. Strengthening: Hands on head 3. Move your head backward, forward, and side to side against gentle pressure from your hands, holding each position for about 6 seconds. 4. Repeat 8 to 12 times. Follow-up care is a key part of your treatment and safety. Be sure to make and go to all appointments, and call your doctor if you are having problems. It's also a good idea to know your test results and keep a list of the medicines you take. Where can you learn more? Go to http://natalie-jeet.info/. Enter P975 in the search box to learn more about \"Neck: Exercises. \" Current as of: March 21, 2017 Content Version: 11.3 © 0893-2639 [employer] Dynamics. Care instructions adapted under license by Kickplay (which disclaims liability or warranty for this information). If you have questions about a medical condition or this instruction, always ask your healthcare professional. [place] [address] any warranty or liability for your use of this information. Neck Pain: Care Instructions Your Care Instructions You can have neck pain anywhere from the bottom of your head to the top of your shoulders. It can spread to the upper back or arms. Injuries, painting a ceiling, sleeping with your neck twisted, staying in one position for too long, and many other activities can cause neck pain. Most neck pain gets better with home care. Your doctor may recommend medicine to relieve pain or relax your muscles. He or she may suggest exercise and physical therapy to increase flexibility and relieve stress. You may need to wear a special (cervical) collar to support your neck for a day or two. Follow-up care is a key part of your treatment and safety. Be sure to make and go to all appointments, and call your doctor if you are having problems. It's also a good idea to know your test results and keep a list of the medicines you take. How can you care for yourself at home? · Try using a heating pad on a low or medium setting for 15 to 20 minutes every 2 or 3 hours. Try a warm shower in place of one session with the heating pad. · You can also try an ice pack for 10 to 15 minutes every 2 to 3 hours. Put a thin cloth between the ice and your skin. · Take pain medicines exactly as directed. ¨ If the doctor gave you a prescription medicine for pain, take it as prescribed. ¨ If you are not taking a prescription pain medicine, ask your doctor if you can take an over-the-counter medicine. · If your doctor recommends a cervical collar, wear it exactly as directed. When should you call for help? Call your doctor now or seek immediate medical care if: 
· You have new or worsening numbness in your arms, buttocks or legs. · You have new or worsening weakness in your arms or legs. (This could make it hard to stand up.) · You lose control of your bladder or bowels. Watch closely for changes in your health, and be sure to contact your doctor if: 
· Your neck pain is getting worse. · You are not getting better after 1 week. · You do not get better as expected. Where can you learn more? Go to http://natalie-jeet.info/. Enter [id number] in the search box to learn more about \"Neck Pain: Care Instructions. \" Current as of: March 21, 2017 Content Version: 11.3 © 0882-7002 IWT. Care instructions adapted under license by [employer]um (which disclaims liability or warranty for this information). If you have questions about a medical condition or this instruction, always ask your healthcare professional. [place] [address] any warranty or liability for your use of this information. Introducing [facility]! Dear Kathy Fontenot: 
Thank you for requesting a [employer] account. Our records indicate that you already have an active [employer] account. You can access your account anytime at [email or web address] Did you know that you can access your hospital and ER discharge instructions at any time in [facility]? You can also review all of your test results from your hospital stay or ER visit. Additional Information If you have questions, please visit the Frequently Asked Questions section of the [facility] website at [email or web address] Remember, [facility] is NOT to be used for urgent needs. For medical emergencies, dial 911. Now available from your iPhone and Android! Please provide this summary of care documentation to your next provider. Your primary care clinician is listed as [address] [place] [address]. If you have any questions after today's visit, please call [phone].

## 2017-09-01 NOTE — PROGRESS NOTES
Subjective:      Darol Lombard is a 73 y.o. female who presents today for evaluation of neck pain and stiffness    Neck Pain: 1.5 wks ago, neck stiffness noticed. Next day was still hurting, had to leave work. Has tried heat and ibuprofen 600mg tabs- gave some relief but not much. Had limited ROM of neck. Went to [facility] the next day. Was gave ibuprofen and flexeril. Has completed these medications- took last pills yesterday (x 5 days). Last night started getting stiff again. Applied heat last night and feels better this morning. [place] cracking in neck yesterday with turning head once. No fevers, chills, nausea, vomiting, HA, or dizziness    Has known chronic OA L knee. Has seen Ortho Dr. Lucretia Mcmillan and was offered partial knee replacement, pt declined. Would prefer a total knee if she is going to have surgery  Has been taking Aleve prn and osteobiflex    Breast CA: right mastectomy 6/14/12. Chemo and radiation  05/2017- mammogram normal. Okay- due again 05/2018    Hypercholesterolemia - takes pravastatin. No myalgias      HTN: does not check at home. BP today at goal.  No CP, palpitations, dyspnea, HAs, or dizziness    H/o angioplasty: about 20 years ago. Not currently followed by Cardiology, previously seen by Dr. Stacey Nicole maintenance:   Last mammogram: 05/2017  Last DEXA:  Never had  Last colonoscopy:  2013, s/p polypectomy. Due 2018  Last tetanus vaccination UTD. Last pneumonia vaccination UTD. Last influenza vaccination. Zostavax.   Advanced Directive:        Patient Active Problem List    Diagnosis Date Noted    H/O mammogram 09/01/2017    Chest wall discomfort 07/07/2017    ACP (advance care planning) 03/07/2017    Arthritis of knee, left 01/13/2017    Rib pain on right side 10/05/2016    Sacroiliac joint pain 10/05/2016    Left knee pain 10/05/2016    Pain in right axilla 05/09/2016    CAD (coronary artery disease), native coronary artery 03/01/2016  Degenerative arthritis of left knee 03/01/2016    History of colonoscopy 03/01/2016    Hypercholesteremia 11/16/2015    Essential hypertension 05/11/2015    History of hyperthyroidism 05/11/2015    Neuropathy ([facility] [address] 10/11/2013    S/P mastectomy 02/05/2013    Lymphedema of arm 01/07/2013    Breast cancer, stage 2 (HCC) 06/21/2012     Current Outpatient Prescriptions   Medication Sig Dispense Refill    cyclobenzaprine (FLEXERIL) 10 mg tablet take 1 tablet by mouth three times a day for MUSCLE SPASM  0    ibuprofen (MOTRIN) 800 mg tablet take 1 tablet by mouth three times a day with food OR MILK if needed for pain  0    pravastatin (PRAVACHOL) 80 mg tablet TAKE 1 TABLET NIGHTLY 90 Tab 2    atenolol (TENORMIN) 50 mg tablet TAKE 1 TABLET DAILY 90 Tab 2    amLODIPine (NORVASC) 5 mg tablet Take 1 Tab by mouth daily. 90 Tab 3    benazepril (LOTENSIN) 20 mg tablet Take 1 Tab by mouth daily. 90 Tab 3    lansoprazole (PREVACID) 15 mg capsule Take 15 mg by mouth every other day.  cetirizine (ZYRTEC) 10 mg tablet Take  by mouth.  albuterol (PROVENTIL HFA, VENTOLIN HFA, PROAIR HFA) 90 mcg/actuation inhaler Take 2 Puffs by inhalation every six (6) hours as needed for Wheezing or Shortness of Breath (or tightness in chest). 1 Inhaler 0    cholecalciferol, vitamin D3, 2,000 unit tab Take  by mouth.  aspirin (ASPIRIN) 325 mg tablet Take 1 Tab by mouth daily. 1 Tab 0        Review of Systems    Pertinent items are noted in HPI.      Objective:     Visit Vitals    /82 (BP 1 Location: Left arm, BP Patient Position: Sitting)    Pulse 67    Temp 97.6 °F (36.4 °C) (Oral)    Resp 15    Ht 5' 2\" (1.575 m)    Wt 166 lb (75.3 kg)    SpO2 98%    BMI 30.36 kg/m2     General appearance: alert, cooperative, no distress, appears stated age  Head: Normocephalic, without obvious abnormality, atraumatic  Neck: supple, symmetrical, trachea midline and no JVD  Back: mild curvature of spine, no step-offs, no midline tenderness, mild pain in R posterior neck with turning neck to R, no other pain or limited ROM  Lungs: normal effot  Heart: regular rate  Extremities: extremities normal, atraumatic, no cyanosis or edema  Skin: Skin color, texture, turgor normal. Warm and dry  Neurologic: Grossly normal    Assessment/Plan:     1. Neck pain  - muscle strain most likely. Pt to trial diazepam prn (with heat and ibuprofen). If no improvement or makes too drowsy can switch to flexeril. Pt aware not to take both at the same time  - no fever, chills, nausea, vomiting, signs of systemic illness  - XR SPINE CERV 4 OR 5 V; Future  - DEXA BONE DENSITY STUDY AXIAL; Future  - diazePAM (VALIUM) 2 mg tablet; Take 2.5 Tabs by mouth every eight (8) hours as needed for Anxiety for up to 5 days. Max Daily Amount: 15 mg. Indications: MUSCLE SPASM  Dispense: 15 Tab; Refill: 0  - cyclobenzaprine (FLEXERIL) 10 mg tablet; Take 1 Tab by mouth three (3) times daily as needed for Muscle Spasm(s). Indications: MUSCLE SPASM  Dispense: 21 Tab; Refill: 0  - ibuprofen (MOTRIN) 800 mg tablet; Take 1 Tab by mouth every eight (8) hours as needed for Pain. Indications: Pain  Dispense: 21 Tab; Refill: 0    2. Primary osteoarthritis of left knee  - I evaluated and recommended to continue current doses of medications. - encouraged pt to follow up with Ortho, declined at this time    3. Essential hypertension  - controlled  - I evaluated and recommended to continue current doses of medications. 4. Hyperlipidemia, unspecified hyperlipidemia type  I evaluated and recommended to continue current doses of medications. 5. History of breast cancer  Follow up as needed  Mammogram due 05/2018    6. Routine general medical examination at a health care facility  - XR SPINE CERV 4 OR 5 V; Future  - DEXA BONE DENSITY STUDY AXIAL;  Future      Advised her to call back or return to office if symptoms worsen/change/persist.  Discussed expected course/resolution/complications of diagnosis in detail with patient. Medication risks/benefits/costs/interactions/alternatives discussed with patient. She was given an after visit summary which includes diagnoses, current medications, & vitals. She expressed understanding with the diagnosis and plan.     NATALIE Paul

## 2017-09-20 ENCOUNTER — HOSPITAL ENCOUNTER (OUTPATIENT)
Dept: BONE DENSITY | Age: 67
Discharge: HOME | End: 2017-09-20
Attending: NURSE PRACTITIONER
Payer: MEDICARE

## 2017-09-20 DIAGNOSIS — M54.2 NECK PAIN: ICD-10-CM

## 2017-09-20 DIAGNOSIS — Z00.00 ROUTINE GENERAL MEDICAL EXAMINATION AT A HEALTH CARE FACILITY: ICD-10-CM

## 2017-09-20 PROCEDURE — 77080 DXA BONE DENSITY AXIAL: CPT

## 2017-11-06 ENCOUNTER — OFFICE VISIT (OUTPATIENT)
Dept: INTERNAL MEDICINE CLINIC | Age: 67
End: 2017-11-06

## 2017-11-06 VITALS
TEMPERATURE: 98.3 F | SYSTOLIC BLOOD PRESSURE: 118 MMHG | BODY MASS INDEX: 30.14 KG/M2 | RESPIRATION RATE: 18 BRPM | OXYGEN SATURATION: 98 % | DIASTOLIC BLOOD PRESSURE: 64 MMHG | HEART RATE: 64 BPM | HEIGHT: 62 IN | WEIGHT: 163.8 LBS

## 2017-11-06 DIAGNOSIS — K21.9 GASTROESOPHAGEAL REFLUX DISEASE WITHOUT ESOPHAGITIS: ICD-10-CM

## 2017-11-06 DIAGNOSIS — R06.02 SOB (SHORTNESS OF BREATH): Primary | ICD-10-CM

## 2017-11-06 DIAGNOSIS — M17.12 ARTHRITIS OF KNEE, LEFT: ICD-10-CM

## 2017-11-06 DIAGNOSIS — R06.02 SOB (SHORTNESS OF BREATH): ICD-10-CM

## 2017-11-06 DIAGNOSIS — Z00.00 ROUTINE GENERAL MEDICAL EXAMINATION AT A HEALTH CARE FACILITY: ICD-10-CM

## 2017-11-06 RX ORDER — ALBUTEROL SULFATE 90 UG/1
2 AEROSOL, METERED RESPIRATORY (INHALATION)
Qty: 1 INHALER | Refills: 3 | Status: SHIPPED | OUTPATIENT
Start: 2017-11-06 | End: 2017-11-06 | Stop reason: SDUPTHER

## 2017-11-06 RX ORDER — ALBUTEROL SULFATE 90 UG/1
2 AEROSOL, METERED RESPIRATORY (INHALATION)
Qty: 1 INHALER | Refills: 3 | Status: SHIPPED | OUTPATIENT
Start: 2017-11-06 | End: 2020-04-02 | Stop reason: SDUPTHER

## 2017-11-06 NOTE — PROGRESS NOTES
Chief Complaint   Patient presents with    Breathing Problem     Pt c/o of SOB about three weeks ago on and off         1. Have you been to the ER, urgent care clinic since your last visit? Yes, [facility] since your last visit? No    2. Have you seen or consulted any other health care providers outside of the [address] [place] [address] since your last visit? Yes  Include any pap smears or colon screening.  No

## 2017-11-06 NOTE — MR AVS SNAPSHOT
Visit Information Date & Time Provider Department Dept. Phone Encounter #  
 11/6/2017 10:00 AM [facility] [place] [address] [facility] [phone] Follow-up Instructions Return in about 4 months (around 3/6/2018) for sob, f/u card referral.  
  
Your Appointments 7/12/2018  9:30 AM  
Follow Up with Kd Coffman  Replaced by [facility] at [facility] Appt Note: 1 year follow up, breast ca [address] [address]  
[phone]  
  
   
 [id number] [address] [facility] [address] Upcoming Health Maintenance Date Due ZOSTER VACCINE AGE 60> 8/5/2011 GLAUCOMA SCREENING Q2Y 10/5/2016 Pneumococcal 65+ High/Highest Risk (1 of 2 - PCV13) 10/5/2016 MEDICARE YEARLY EXAM 3/8/2018 COLONOSCOPY 6/3/2018 BREAST CANCER SCRN MAMMOGRAM 5/15/2019 DTaP/Tdap/Td series (2 - Td) 10/4/2023 Allergies as of 11/6/2017  Review Complete On: 11/6/2017 By: Christel Wallace NP Severity Noted Reaction Type Reactions Penicillins High 05/01/2012    Hives Bactrim [Sulfamethoprim Ds] Low 05/08/2012    Rash Ciprofloxacin (Bulk) Low 05/08/2012    Rash Current Immunizations  Reviewed on 7/7/2017 Name Date Pneumococcal Polysaccharide (PPSV-23) 10/4/2013 Td 11/19/2002 Tdap 10/4/2013 Not reviewed this visit You Were Diagnosed With   
  
 Codes Comments SOB (shortness of breath)    -  Primary ICD-10-CM: R06.02 
ICD-9-CM: 786.05 Routine general medical examination at a health care facility     ICD-10-CM: Z00.00 ICD-9-CM: V70.0 Arthritis of knee, left     ICD-10-CM: M17.12 
ICD-9-CM: 716.96 Vitals BP Pulse Temp Resp Height(growth percentile) Weight(growth percentile)  
 118/64 (BP 1 Location: Left arm, BP Patient Position: Sitting) 64 98.3 °F (36.8 °C) (Oral) 18 5' 1.6\" (1.565 m) 163 lb 12.8 oz (74.3 kg) SpO2 BMI OB Status Smoking Status 98% 30.35 kg/m2 Hysterectomy Never Smoker Vitals History BMI and BSA Data Body Mass Index Body Surface Area  
 30.35 kg/m 2 1.8 m 2 Preferred Pharmacy Pharmacy Name Phone [facility] [phone] Your Updated Medication List  
  
   
This list is accurate as of: 11/6/17 11:46 AM.  Always use your most recent med list.  
  
  
  
  
 albuterol 90 mcg/actuation inhaler Commonly known as:  PROVENTIL HFA, VENTOLIN HFA, PROAIR HFA Take 2 Puffs by inhalation every four (4) hours as needed for Wheezing. Indications: BRONCHOSPASM PREVENTION  
  
 amLODIPine 5 mg tablet Commonly known as:  Paty Francisco Take 1 Tab by mouth daily. aspirin 325 mg tablet Commonly known as:  ASPIRIN Take 1 Tab by mouth daily. atenolol 50 mg tablet Commonly known as:  TENORMIN  
TAKE 1 TABLET DAILY  
  
 benazepril 20 mg tablet Commonly known as:  LOTENSIN Take 1 Tab by mouth daily. cholecalciferol (vitamin D3) 2,000 unit Tab Take  by mouth. ibuprofen 800 mg tablet Commonly known as:  MOTRIN Take 1 Tab by mouth every eight (8) hours as needed for Pain. Indications: Pain  
  
 pravastatin 80 mg tablet Commonly known as:  PRAVACHOL  
TAKE 1 TABLET NIGHTLY  
  
 PREVACID 15 mg capsule Generic drug:  lansoprazole Take 15 mg by mouth as needed. ZyrTEC 10 mg tablet Generic drug:  cetirizine Take  by mouth. Prescriptions Sent to Pharmacy Refills  
 albuterol (PROVENTIL HFA, VENTOLIN HFA, PROAIR HFA) 90 mcg/actuation inhaler (Discontinued) 3 Sig: Take 2 Puffs by inhalation every four (4) hours as needed for Wheezing. Indications: BRONCHOSPASM PREVENTION Class: Normal  
 Pharmacy: [address], [address] [place] [address] Ph #: [phone] Route: Inhalation Reason for Discontinue: Reorder We Performed the Following YOSVANY POC EKG ROUTINE W/ 12 LEADS, INTER & REP [22586 CPT(R)] CBC WITH AUTOMATED DIFF [79136 CPT(R)] METABOLIC PANEL, COMPREHENSIVE [79948 CPT(R)] REFERRAL TO CHIROPRACTIC [REF16 Custom] Comments:  
 Osteoarthritis, neck pain, back pain, knee pain- chronic TSH RFX ON ABNORMAL TO FREE T4 [[id number] Custom] Follow-up Instructions Return in about 4 months (around 3/6/2018) for sob, f/u card referral.  
  
  
Referral Information Referral ID Referred By Referred To  
  
 [id number] Ning Hogan S Not Available Visits Status Start Date End Date 1 New Request 11/6/17 11/6/18 If your referral has a status of pending review or denied, additional information will be sent to support the outcome of this decision. Patient Instructions Dr. Anila Souza: The [address], [address] Map this location Phone: [phone] Call and schedule an appointment to reestablish care 1. Continue certirizine 33CE daily 2. Start OTC daily steroid nasal spray (flonase or nasacort) 3. Continue albuterol inhalerl 2 puffs every 3-4 hours as needed. Let me know if you need it more than 2x/day Introducing [facility] SERVICES! Dear Keven Cameron: 
Thank you for requesting a [employer] account. Our records indicate that you already have an active [employer] account. You can access your account anytime at [email or web address] Did you know that you can access your hospital and ER discharge instructions at any time in [employer]? You can also review all of your test results from your hospital stay or ER visit. Additional Information If you have questions, please visit the Frequently Asked Questions section of the [employer] website at [email or web address] Remember, [employer] is NOT to be used for urgent needs. For medical emergencies, dial 911. Now available from your iPhone and Android! Please provide this summary of care documentation to your next provider. Your primary care clinician is listed as Esperanza Fees. If you have any questions after today's visit, please call [phone].

## 2017-11-06 NOTE — PROGRESS NOTES
Subjective:      Bill Rhodes is a 78 y.o. female who presents today for evaluation of shortness of breath    Has PMH: hyperlipidemia, htn, angioplasty ~ 20 years ago, breast CA s/p mastectomy 2012    SOB/cough: x few weeks  + nasal congestion and sneezing  No sore throat  Dry cough, brought on by trying to take deep breaths  Has been using albuterol inhaler prn which helps, not using every day, only a few times a week  Does not feel that she has been wheezing  Does have remote h/o asthma  No fever or chills, nausea or vomiting    + h/o allergies  Taking certirizine  Has nasacort but not using    Heartburn:  Brought on by onions  Takes prevacid frequently  No worsening than baseline  Follows with Dr. Mauricio Faust for colonoscopy- due 2018  Never had EGD    Health maintenance:   Last mammogram: 05/2017  Last DEXA:  Never had  Last colonoscopy:  2013, s/p polypectomy. Due 2018    Patient Active Problem List    Diagnosis Date Noted    H/O mammogram 09/01/2017    Chest wall discomfort 07/07/2017    ACP (advance care planning) 03/07/2017    Arthritis of knee, left 01/13/2017    Rib pain on right side 10/05/2016    Sacroiliac joint pain 10/05/2016    Left knee pain 10/05/2016    Pain in right axilla 05/09/2016    CAD (coronary artery disease), native coronary artery 03/01/2016    Degenerative arthritis of left knee 03/01/2016    History of colonoscopy 03/01/2016    Hypercholesteremia 11/16/2015    Essential hypertension 05/11/2015    History of hyperthyroidism 05/11/2015    Neuropathy 10/11/2013    S/P mastectomy 02/05/2013    Lymphedema of arm 01/07/2013    Breast cancer, stage 2 ([facility] [address] 06/21/2012     Current Outpatient Prescriptions   Medication Sig Dispense Refill    albuterol (PROVENTIL HFA, VENTOLIN HFA, PROAIR HFA) 90 mcg/actuation inhaler Take 2 Puffs by inhalation every four (4) hours as needed for Wheezing.  Indications: BRONCHOSPASM PREVENTION 1 Inhaler 3    ibuprofen (MOTRIN) 800 mg tablet Take 1 Tab by mouth every eight (8) hours as needed for Pain. Indications: Pain 21 Tab 0    pravastatin (PRAVACHOL) 80 mg tablet TAKE 1 TABLET NIGHTLY 90 Tab 2    atenolol (TENORMIN) 50 mg tablet TAKE 1 TABLET DAILY 90 Tab 2    amLODIPine (NORVASC) 5 mg tablet Take 1 Tab by mouth daily. 90 Tab 3    benazepril (LOTENSIN) 20 mg tablet Take 1 Tab by mouth daily. 90 Tab 3    lansoprazole (PREVACID) 15 mg capsule Take 15 mg by mouth as needed.  cetirizine (ZYRTEC) 10 mg tablet Take  by mouth.  cholecalciferol, vitamin D3, 2,000 unit tab Take  by mouth.  aspirin (ASPIRIN) 325 mg tablet Take 1 Tab by mouth daily. 1 Tab 0        Review of Systems    Pertinent items are noted in HPI. Objective:     Visit Vitals    /64 (BP 1 Location: Left arm, BP Patient Position: Sitting)    Pulse 64    Temp 98.3 °F (36.8 °C) (Oral)    Resp 18    Ht 5' 1.6\" (1.565 m)    Wt 163 lb 12.8 oz (74.3 kg)    SpO2 98%    BMI 30.35 kg/m2     General appearance: alert, cooperative, no distress, appears stated age  Head: Normocephalic, without obvious abnormality, atraumatic  Throat: throat pale, with cobblestoning, no erythema  Neck: supple, symmetrical, trachea midline, no adenopathy, thyroid: not enlarged, symmetric, no tenderness/mass/nodules, no carotid bruit and no JVD  Lungs: clear to auscultation bilaterally, nonlabored, occasional nonfrequent dry cough  Heart: regular rate and rhythm, S1, S2 normal, no murmur, click, rub or gallop  Extremities: extremities normal, atraumatic, no cyanosis or edema  Skin: Skin color, texture, turgor normal. No rashes or lesions  Lymph nodes: Cervical, supraclavicular, and axillary nodes normal.  Neurologic: Grossly normal    Assessment/Plan:     1. Routine general medical examination at a health care facility  - TSH RFX ON ABNORMAL TO FREE T4  - CBC WITH AUTOMATED DIFF  - METABOLIC PANEL, COMPREHENSIVE    2.  SOB (shortness of breath)  - likely asthma triggered by allergies  - has good breath sounds throughout, no wheezing  - cont zyrtec daily, start using nasacort daily  - cont albuterol prn, needs to let me know if using > 2x/day  - does not appear cardiac in origin, however given history pt should reestablish care with Dr. Roseanne Hamilton- contact information provided  - AMB POC EKG ROUTINE W/ 12 LEADS, INTER & REP  - CBC WITH AUTOMATED DIFF    3. Gastroesophageal reflux disease without esophagitis  - cont prevacid as needed  - discussed long term use of PPIs  - pt due for colonoscopy with Dr. Mauricio Faust in 2018. Pt instructed to touch base with him regarding heartburn and discuss if further evaluation is needed  - avoidance of trigger foods    4. Arthritis of knee, left  - cont current management  - cont ibuprofen prn  - REFERRAL TO CHIROPRACTIC        Advised her to call back or return to office if symptoms worsen/change/persist.  Discussed expected course/resolution/complications of diagnosis in detail with patient. Medication risks/benefits/costs/interactions/alternatives discussed with patient. She was given an after visit summary which includes diagnoses, current medications, & vitals. She expressed understanding with the diagnosis and plan.     Claudene Pupa, NATALIE

## 2017-11-06 NOTE — TELEPHONE ENCOUNTER
Patient is requesting medication to be sent to [facility] on [address]. Last office visit- 9/1/17  Next office visit- No upcoming   Last Refill- today    Requested Prescriptions     Pending Prescriptions Disp Refills    albuterol (PROVENTIL HFA, VENTOLIN HFA, PROAIR HFA) 90 mcg/actuation inhaler 1 Inhaler 3     Sig: Take 2 Puffs by inhalation every four (4) hours as needed for Wheezing.  Indications: BRONCHOSPASM PREVENTION

## 2017-11-07 LAB
ALBUMIN SERPL-MCNC: 4.2 G/DL (ref 3.6–4.8)
ALBUMIN/GLOB SERPL: 1.2 {RATIO} (ref 1.2–2.2)
ALP SERPL-CCNC: 85 IU/L (ref 39–117)
ALT SERPL-CCNC: 9 IU/L (ref 0–32)
AST SERPL-CCNC: 12 IU/L (ref 0–40)
BASOPHILS # BLD AUTO: 0 X10E3/UL (ref 0–0.2)
BASOPHILS NFR BLD AUTO: 0 %
BILIRUB SERPL-MCNC: 0.3 MG/DL (ref 0–1.2)
BUN SERPL-MCNC: 9 MG/DL (ref 8–27)
BUN/CREAT SERPL: 12 (ref 12–28)
CALCIUM SERPL-MCNC: 9.6 MG/DL (ref 8.7–10.3)
CHLORIDE SERPL-SCNC: 99 MMOL/L (ref 96–106)
CO2 SERPL-SCNC: 25 MMOL/L (ref 18–29)
CREAT SERPL-MCNC: 0.76 MG/DL (ref 0.57–1)
EOSINOPHIL # BLD AUTO: 0.4 X10E3/UL (ref 0–0.4)
EOSINOPHIL NFR BLD AUTO: 5 %
ERYTHROCYTE [DISTWIDTH] IN BLOOD BY AUTOMATED COUNT: 15.1 % (ref 12.3–15.4)
GFR SERPLBLD CREATININE-BSD FMLA CKD-EPI: 82 ML/MIN/1.73
GFR SERPLBLD CREATININE-BSD FMLA CKD-EPI: 95 ML/MIN/1.73
GLOBULIN SER CALC-MCNC: 3.4 G/DL (ref 1.5–4.5)
GLUCOSE SERPL-MCNC: 94 MG/DL (ref 65–99)
HCT VFR BLD AUTO: 41.5 % (ref 34–46.6)
HGB BLD-MCNC: 12.9 G/DL (ref 11.1–15.9)
IMM GRANULOCYTES # BLD: 0 X10E3/UL (ref 0–0.1)
IMM GRANULOCYTES NFR BLD: 0 %
LYMPHOCYTES # BLD AUTO: 2.6 X10E3/UL (ref 0.7–3.1)
LYMPHOCYTES NFR BLD AUTO: 33 %
MCH RBC QN AUTO: 26.4 PG (ref 26.6–33)
MCHC RBC AUTO-ENTMCNC: 31.1 G/DL (ref 31.5–35.7)
MCV RBC AUTO: 85 FL (ref 79–97)
MONOCYTES # BLD AUTO: 0.6 X10E3/UL (ref 0.1–0.9)
MONOCYTES NFR BLD AUTO: 8 %
NEUTROPHILS # BLD AUTO: 4.3 X10E3/UL (ref 1.4–7)
NEUTROPHILS NFR BLD AUTO: 54 %
PLATELET # BLD AUTO: 316 X10E3/UL (ref 150–379)
POTASSIUM SERPL-SCNC: 4.2 MMOL/L (ref 3.5–5.2)
PROT SERPL-MCNC: 7.6 G/DL (ref 6–8.5)
RBC # BLD AUTO: 4.89 X10E6/UL (ref 3.77–5.28)
SODIUM SERPL-SCNC: 141 MMOL/L (ref 134–144)
TSH SERPL DL<=0.005 MIU/L-ACNC: 0.98 UIU/ML (ref 0.45–4.5)
WBC # BLD AUTO: 8.1 X10E3/UL (ref 3.4–10.8)

## 2018-01-22 ENCOUNTER — OFFICE VISIT (OUTPATIENT)
Dept: INTERNAL MEDICINE CLINIC | Age: 68
End: 2018-01-22

## 2018-01-22 VITALS
OXYGEN SATURATION: 98 % | RESPIRATION RATE: 18 BRPM | SYSTOLIC BLOOD PRESSURE: 132 MMHG | DIASTOLIC BLOOD PRESSURE: 80 MMHG | TEMPERATURE: 99.2 F | HEIGHT: 62 IN | BODY MASS INDEX: 29.81 KG/M2 | WEIGHT: 162 LBS | HEART RATE: 67 BPM

## 2018-01-22 DIAGNOSIS — R22.31 FOREARM MASS, RIGHT: Primary | ICD-10-CM

## 2018-01-22 NOTE — PROGRESS NOTES
Subjective:      Marc Rossi is a 78 y.o. female who presents today for evaluation of cyst on her arm    Right forearm  Noticed at first on Thursday  No precipitating trauma or injury, although does tend to push open door with her R forearm while carrying grandson in left arm  Occasionally tender, but for the most part nontender  No lymph nodes RUE and R breast mastectomy in 2012  No fever or chills  Feels is getting smaller than was when first noticed    Has h/o cysts in breasts prior to breast cancer dx    Having vision testing done 01/30/2018      Patient Active Problem List    Diagnosis Date Noted    H/O mammogram 09/01/2017    Chest wall discomfort 07/07/2017    ACP (advance care planning) 03/07/2017    Arthritis of knee, left 01/13/2017    Rib pain on right side 10/05/2016    Sacroiliac joint pain 10/05/2016    Left knee pain 10/05/2016    Pain in right axilla 05/09/2016    CAD (coronary artery disease), native coronary artery 03/01/2016    Degenerative arthritis of left knee 03/01/2016    History of colonoscopy 03/01/2016    Hypercholesteremia 11/16/2015    Essential hypertension 05/11/2015    History of hyperthyroidism 05/11/2015    Neuropathy 10/11/2013    S/P mastectomy 02/05/2013    Lymphedema of arm 01/07/2013    Breast cancer, stage 2 ([facility] [address] 06/21/2012     Current Outpatient Prescriptions   Medication Sig Dispense Refill    albuterol (PROVENTIL HFA, VENTOLIN HFA, PROAIR HFA) 90 mcg/actuation inhaler Take 2 Puffs by inhalation every four (4) hours as needed for Wheezing. Indications: BRONCHOSPASM PREVENTION 1 Inhaler 3    ibuprofen (MOTRIN) 800 mg tablet Take 1 Tab by mouth every eight (8) hours as needed for Pain. Indications: Pain 21 Tab 0    pravastatin (PRAVACHOL) 80 mg tablet TAKE 1 TABLET NIGHTLY 90 Tab 2    atenolol (TENORMIN) 50 mg tablet TAKE 1 TABLET DAILY 90 Tab 2    amLODIPine (NORVASC) 5 mg tablet Take 1 Tab by mouth daily.  90 Tab 3    benazepril (LOTENSIN) 20 mg tablet Take 1 Tab by mouth daily. 90 Tab 3    lansoprazole (PREVACID) 15 mg capsule Take 15 mg by mouth as needed.  cetirizine (ZYRTEC) 10 mg tablet Take  by mouth.  cholecalciferol, vitamin D3, 2,000 unit tab Take  by mouth.  aspirin (ASPIRIN) 325 mg tablet Take 1 Tab by mouth daily. 1 Tab 0        Review of Systems    Pertinent items are noted in HPI. Objective:     Visit Vitals    /80 (BP 1 Location: Left arm, BP Patient Position: Sitting)    Pulse 67    Temp 99.2 °F (37.3 °C) (Oral)    Resp 18    Ht 5' 2.4\" (1.585 m)    Wt 162 lb (73.5 kg)    SpO2 98%    BMI 29.25 kg/m2     General appearance: alert, cooperative, no distress, appears stated age  Head: Normocephalic, without obvious abnormality, atraumatic  Extremities: small movable nodule R ulnar side of forearm- nontender, ~ 1.5cm x 1.5cm  Skin: Skin color, texture, turgor normal. No erythema  Neurologic: Grossly normal    Assessment/Plan:     1. Forearm mass, right  - likely a cyst, is quite firm, but slightly movable  - reviewed with patient  - US EXT NONVAS RT LTD; Future      Advised her to call back or return to office if symptoms worsen/change/persist.  Discussed expected course/resolution/complications of diagnosis in detail with patient. Medication risks/benefits/costs/interactions/alternatives discussed with patient. She was given an after visit summary which includes diagnoses, current medications, & vitals. She expressed understanding with the diagnosis and plan.     NATALIE Kebede

## 2018-01-22 NOTE — PROGRESS NOTES
Chief Complaint   Patient presents with    Cyst     cyst on arm (right) patient states that syst formed thursday         1. Have you been to the ER, urgent care clinic since your last visit? No  Hospitalized since your last visit? No    2. Have you seen or consulted any other health care providers outside of the [address] [place] [address] since your last visit? No  Include any pap smears or colon screening.  No

## 2018-01-22 NOTE — PATIENT INSTRUCTIONS
Let me know if no improvement in 2 weeks and we will ultrasound      Home-made Nasal Saline Rinse  Directions for preparing home-made nasal saline  1. Clean a 1-Quart glass jar carefully, then fill it with bottled or boiled water. 2. Add 1 or 2 heaping teaspoons of pickling or brock salt, or Kosher salt. If you use table salt, you may be getting a preservative and/or additive which might irritate your nose. 3. Add 1 rounded teaspoon of baking soda (pure bicarbonate). 4. Store at room temperature and shake or stir before each use. 5. Mix a new batch weekly. To use:  1. Pour some of the mixture into a clean bowl. Warming it to body temperature may help, but make sure it is not hot. 2. Fill a spray bottle or a bulb syringe. 3. Stand over the sink or in the shower and apply gentle pressure on the syringe or bottle so that the the mixture goes into one side of the nose and comes out the other side. Tilt your head so you are inserting the syringe on the top nostril and forward so it doesn't go to the back of throat. Use slight pressure from the syringe and allow the mixture to come out the other nostril on the bottom as your head is tilted. Rinse the nose at daily especially at night before bedtime to wash away all of the allergens you accumulated during the day. You will breathe better and therefore sleep deeper.

## 2018-01-22 NOTE — MR AVS SNAPSHOT
[address], [address] [place] [address] 
[phone] Patient: Susannah Nunez MRN: [id number] CAROLINE:05/0/2000 Visit Information Date & Time Provider Department Dept. Phone Encounter #  
 1/22/2018  9:30 AM PREETI [facility] [phone] [id number] Follow-up Instructions Return in about 4 months (around 5/22/2018) for htn, referral for colonoscopy. Your Appointments 7/12/2018  9:30 AM  
Follow Up with Isael Gordillo  [facility] at [facility] Appt Note: 1 year follow up, breast ca [address] [address]  
[phone]  
  
   
 [address] [address] Upcoming Health Maintenance Date Due ZOSTER VACCINE AGE 60> 8/5/2011 GLAUCOMA SCREENING Q2Y 10/5/2016 Pneumococcal 65+ High/Highest Risk (1 of 2 - PCV13) 10/5/2016 COLONOSCOPY 6/3/2018 MEDICARE YEARLY EXAM 3/8/2018 BREAST CANCER SCRN MAMMOGRAM 5/15/2019 DTaP/Tdap/Td series (2 - Td) 10/4/2023 Allergies as of 1/22/2018  Review Complete On: 1/22/2018 By: Stacey Ramon NP Severity Noted Reaction Type Reactions Penicillins High 05/01/2012    Hives Bactrim [Sulfamethoprim Ds] Low 05/08/2012    Rash Ciprofloxacin (Bulk) Low 05/08/2012    Rash Current Immunizations  Reviewed on 7/7/2017 Name Date Pneumococcal Polysaccharide (PPSV-23) 10/4/2013 Td 11/19/2002 Tdap 10/4/2013 Not reviewed this visit You Were Diagnosed With   
  
 Codes Comments Skin lump of arm, right    -  Primary ICD-10-CM: R22.31 
ICD-9-CM: 782. 2 Vitals BP Pulse Temp Resp Height(growth percentile) Weight(growth percentile) 132/80 (BP 1 Location: Left arm, BP Patient Position: Sitting) 67 99.2 °F (37.3 °C) (Oral) 18 5' 2.4\" (1.585 m) 162 lb (73.5 kg) SpO2 BMI OB Status Smoking Status 98% 29.25 kg/m2 Hysterectomy Never Smoker Vitals History BMI and BSA Data Body Mass Index Body Surface Area  
 29.25 kg/m 2 1.8 m 2 Preferred Pharmacy Pharmacy Name Phone [address], [address] [phone] Your Updated Medication List  
  
   
This list is accurate as of: 1/22/18 10:03 AM.  Always use your most recent med list.  
  
  
  
  
 albuterol 90 mcg/actuation inhaler Commonly known as:  PROVENTIL HFA, VENTOLIN HFA, PROAIR HFA Take 2 Puffs by inhalation every four (4) hours as needed for Wheezing. Indications: BRONCHOSPASM PREVENTION  
  
 amLODIPine 5 mg tablet Commonly known as:  Allena Vincent Take 1 Tab by mouth daily. aspirin 325 mg tablet Commonly known as:  ASPIRIN Take 1 Tab by mouth daily. atenolol 50 mg tablet Commonly known as:  TENORMIN  
TAKE 1 TABLET DAILY  
  
 benazepril 20 mg tablet Commonly known as:  LOTENSIN Take 1 Tab by mouth daily. cholecalciferol (vitamin D3) 2,000 unit Tab Take  by mouth. ibuprofen 800 mg tablet Commonly known as:  MOTRIN Take 1 Tab by mouth every eight (8) hours as needed for Pain. Indications: Pain  
  
 pravastatin 80 mg tablet Commonly known as:  PRAVACHOL  
TAKE 1 TABLET NIGHTLY  
  
 PREVACID 15 mg capsule Generic drug:  lansoprazole Take 15 mg by mouth as needed. ZyrTEC 10 mg tablet Generic drug:  cetirizine Take  by mouth. Follow-up Instructions Return in about 4 months (around 5/22/2018) for htn, referral for colonoscopy. Patient Instructions Let me know if no improvement in 2 weeks and we will ultrasound Home-made Nasal Saline Rinse Directions for preparing home-made nasal saline 1. Clean a 1-Quart glass jar carefully, then fill it with bottled or boiled water.  
2. Add 1 or 2 heaping teaspoons of pickling or brock salt, or Kosher salt. If you use table salt, you may be getting a preservative and/or additive which might irritate your nose. 3. Add 1 rounded teaspoon of baking soda (pure bicarbonate). 4. Store at room temperature and shake or stir before each use. 5. Mix a new batch weekly. To use: 1. Pour some of the mixture into a clean bowl. Warming it to body temperature may help, but make sure it is not hot. 2. Fill a spray bottle or a bulb syringe. 3. Stand over the sink or in the shower and apply gentle pressure on the syringe or bottle so that the the mixture goes into one side of the nose and comes out the other side. Tilt your head so you are inserting the syringe on the top nostril and forward so it doesn't go to the back of throat. Use slight pressure from the syringe and allow the mixture to come out the other nostril on the bottom as your head is tilted. Rinse the nose at daily especially at night before bedtime to wash away all of the allergens you accumulated during the day. You will breathe better and therefore sleep deeper. Introducing [facility] SERVICES! Dear Naima Carey: 
Thank you for requesting a [employer] account. Our records indicate that you already have an active [employer] account. You can access your account anytime at [email or web address] Did you know that you can access your hospital and ER discharge instructions at any time in [employer]? You can also review all of your test results from your hospital stay or ER visit. Additional Information If you have questions, please visit the Frequently Asked Questions section of the [employer] website at [email or web address] Remember, [employer] is NOT to be used for urgent needs. For medical emergencies, dial 911. Now available from your iPhone and Android! Please provide this summary of care documentation to your next provider. Your primary care clinician is listed as Claudeen Six.  If you have any questions after today's visit, please call [phone].

## 2018-03-16 ENCOUNTER — HOSPITAL ENCOUNTER (OUTPATIENT)
Dept: ULTRASOUND IMAGING | Age: 68
Discharge: HOME | End: 2018-03-16
Attending: NURSE PRACTITIONER

## 2018-03-16 DIAGNOSIS — R22.31 FOREARM MASS, RIGHT: ICD-10-CM

## 2018-03-20 ENCOUNTER — OFFICE VISIT (OUTPATIENT)
Dept: INTERNAL MEDICINE CLINIC | Age: 68
End: 2018-03-20

## 2018-03-20 ENCOUNTER — HOSPITAL ENCOUNTER (OUTPATIENT)
Dept: GENERAL RADIOLOGY | Age: 68
Discharge: HOME | End: 2018-03-20
Payer: MEDICARE

## 2018-03-20 ENCOUNTER — TELEPHONE (OUTPATIENT)
Dept: INTERNAL MEDICINE CLINIC | Age: 68
End: 2018-03-20

## 2018-03-20 VITALS
HEIGHT: 62 IN | WEIGHT: 158.6 LBS | DIASTOLIC BLOOD PRESSURE: 82 MMHG | HEART RATE: 65 BPM | RESPIRATION RATE: 15 BRPM | TEMPERATURE: 99.2 F | SYSTOLIC BLOOD PRESSURE: 124 MMHG | OXYGEN SATURATION: 98 % | BODY MASS INDEX: 29.19 KG/M2

## 2018-03-20 DIAGNOSIS — Z00.00 MEDICARE ANNUAL WELLNESS VISIT, SUBSEQUENT: Primary | ICD-10-CM

## 2018-03-20 DIAGNOSIS — R20.2 NUMBNESS AND TINGLING OF RIGHT UPPER EXTREMITY: ICD-10-CM

## 2018-03-20 DIAGNOSIS — R20.0 NUMBNESS AND TINGLING OF RIGHT UPPER EXTREMITY: ICD-10-CM

## 2018-03-20 DIAGNOSIS — M47.22 OSTEOARTHRITIS OF SPINE WITH RADICULOPATHY, CERVICAL REGION: Primary | ICD-10-CM

## 2018-03-20 DIAGNOSIS — Z23 ENCOUNTER FOR IMMUNIZATION: ICD-10-CM

## 2018-03-20 DIAGNOSIS — Z98.890 HISTORY OF COLONOSCOPY: ICD-10-CM

## 2018-03-20 PROCEDURE — 72050 X-RAY EXAM NECK SPINE 4/5VWS: CPT

## 2018-03-20 RX ORDER — LANOLIN ALCOHOL/MO/W.PET/CERES
2000 CREAM (GRAM) TOPICAL DAILY
COMMUNITY

## 2018-03-20 RX ORDER — METHYLPREDNISOLONE 4 MG/1
TABLET ORAL
Qty: 1 DOSE PACK | Refills: 0 | Status: SHIPPED | OUTPATIENT
Start: 2018-03-20 | End: 2018-06-21 | Stop reason: ALTCHOICE

## 2018-03-20 NOTE — TELEPHONE ENCOUNTER
Called patient and reviewed cervical spine xray with her    Will treat conservatively for now with medrol dose pack and referral to Physical Therapy.   Patient prefers [facility] for PT and will call them today to schedule    Patient to let me know if numbness or RUE worsens or does not improve with the above measures and will consider referral to Neurosurgery at that time    Discussed plan with Dr. Cordell Jeffery, NP

## 2018-03-20 NOTE — PROGRESS NOTES
1. Have you been to the ER, urgent care clinic since your last visit? Hospitalized since your last visit? No    2. Have you seen or consulted any other health care providers outside of the [address] [place] [address] since your last visit? Include any pap smears or colon screening. No    Chief Complaint   Patient presents with    Annual Wellness Visit    Shortness of Breath     follow up     Not Fasting    Patient has not had prevnar 13 vaccine. Eye exam was done 2/2018 with [facility] on [facility]. Has never had a shingles vaccine.

## 2018-03-20 NOTE — MR AVS SNAPSHOT
[address] [facility], [address] [facility] 57 
[phone] Patient: Sherice Munguia MRN: [id number]:[id number] Visit Information Date & Time Provider Department Dept. Phone Encounter #  
 3/20/2018 11:00 AM [facility]  Follow-up Instructions Return in about 3 months (around 6/20/2018) for RUE numbness/tingling. Your Appointments 7/12/2018  9:30 AM  
Follow Up with Ronald Mcgill  [facility] Oncology at [facility]) Appt Note: 1 year follow up, breast ca [address] [facility] [address] [id number]  
[phone]  
  
   
 [address] [address] [facility] [address] Upcoming Health Maintenance Date Due ZOSTER VACCINE AGE 60> 8/5/2011 GLAUCOMA SCREENING Q2Y 10/5/2016 Pneumococcal 65+ High/Highest Risk (1 of 2 - PCV13) 10/5/2016 COLONOSCOPY 6/3/2018 BREAST CANCER SCRN MAMMOGRAM 5/15/2019 DTaP/Tdap/Td series (2 - Td) 10/4/2023 Allergies as of 3/20/2018  Review Complete On: 3/20/2018 By: Naomi LamELIZA watson Severity Noted Reaction Type Reactions Penicillins High 05/01/2012    Hives Bactrim [Sulfamethoprim Ds] Low 05/08/2012    Rash Ciprofloxacin (Bulk) Low 05/08/2012    Rash Current Immunizations  Reviewed on 7/7/2017 Name Date Pneumococcal Polysaccharide (PPSV-23) 10/4/2013 Td 11/19/2002 Tdap 10/4/2013 Not reviewed this visit You Were Diagnosed With   
  
 Codes Comments History of colonoscopy    -  Primary ICD-10-CM: L62.971 ICD-9-CM: V45.89 Medicare annual wellness visit, subsequent     ICD-10-CM: Z00.00 ICD-9-CM: V70.0 Numbness and tingling of right upper extremity     ICD-10-CM: R20.0, R20.2 ICD-9-CM: 782.0 Encounter for immunization     ICD-10-CM: H44 ICD-9-CM: V03.89 Vitals BP Pulse Temp Resp Height(growth percentile) Weight(growth percentile) 124/82 (BP 1 Location: Left arm, BP Patient Position: Sitting) 65 99.2 °F (37.3 °C) (Oral) 15 5' 2.4\" (1.585 m) 158 lb 9.6 oz (71.9 kg) SpO2 BMI OB Status Smoking Status 98% 28.64 kg/m2 Hysterectomy Never Smoker Vitals History BMI and BSA Data Body Mass Index Body Surface Area  
 28.64 kg/m 2 1.78 m 2 Preferred Pharmacy Pharmacy Name Phone [address] [place] [address], [facility] [address] [phone] Your Updated Medication List  
  
   
This list is accurate as of 3/20/18 11:49 AM.  Always use your most recent med list.  
  
  
  
  
 albuterol 90 mcg/actuation inhaler Commonly known as:  PROVENTIL HFA, VENTOLIN HFA, PROAIR HFA Take 2 Puffs by inhalation every four (4) hours as needed for Wheezing. Indications: BRONCHOSPASM PREVENTION  
  
 amLODIPine 5 mg tablet Commonly known as:  Elvia Hair Take 1 Tab by mouth daily. aspirin 325 mg tablet Commonly known as:  ASPIRIN Take 1 Tab by mouth daily. atenolol 50 mg tablet Commonly known as:  TENORMIN  
TAKE 1 TABLET DAILY  
  
 benazepril 20 mg tablet Commonly known as:  LOTENSIN Take 1 Tab by mouth daily. cholecalciferol (vitamin D3) 2,000 unit Tab Take 1,000 Units by mouth.  
  
 cyanocobalamin 1,000 mcg tablet Take 1,000 mcg by mouth daily. pneumococcal 13 matt conj dip 0.5 mL Syrg injection Commonly known as:  PREVNAR-13  
0.5 mL by IntraMUSCular route once for 1 dose. pravastatin 80 mg tablet Commonly known as:  PRAVACHOL  
TAKE 1 TABLET NIGHTLY  
  
 PREVACID 15 mg capsule Generic drug:  lansoprazole Take 15 mg by mouth as needed. ZyrTEC 10 mg tablet Generic drug:  cetirizine Take  by mouth. Prescriptions Printed  Refills  
 pneumococcal 13 matt conj dip (PREVNAR-13) 0.5 mL syrg injection 0  
 Si.5 mL by IntraMUSCular route once for 1 dose. Class: Print Route: IntraMUSCular We Performed the Following CBC WITH AUTOMATED DIFF [95915 CPT(R)] METABOLIC PANEL, COMPREHENSIVE [90981 CPT(R)] REFERRAL TO GASTROENTEROLOGY [COQ89 Custom] Comments:  
 colonoscopy VITAMIN B12 [id number] CPT(R)] Follow-up Instructions Return in about 3 months (around 2018) for RUE numbness/tingling. To-Do List   
 2018 Imaging:  XR SPINE CERV 4 OR 5 V Referral Information Referral ID Referred By Referred To  
  
 [id number] RYAN, [address] [facility]   
   [address] [facility] [address] Visits Status Start Date End Date 1 New Request 3/20/18 3/20/19 If your referral has a status of pending review or denied, additional information will be sent to support the outcome of this decision. Referral ID Referred By Referred To  
 [id number] Jhon MARTE Not Available Visits Status Start Date End Date 1 New Request 3/20/18 3/20/19 If your referral has a status of pending review or denied, additional information will be sent to support the outcome of this decision. Patient Instructions 1. Get your cervical spine XRay 2. Have your Prevnar 13 (pneumonia) vaccine given to you at a pharmacy 3. Schedule your colonoscopy Introducing [facility]! Dear Bianka Messina: 
Thank you for requesting a [employer] account. Our records indicate that you already have an active [employer] account. You can access your account anytime at [email or web address] Did you know that you can access your hospital and ER discharge instructions at any time in [employer]? You can also review all of your test results from your hospital stay or ER visit. Additional Information If you have questions, please visit the Frequently Asked Questions section of the Send Word Now website at [email or web address] Remember, [employer] Word Now is NOT to be used for urgent needs. For medical emergencies, dial 911. Now available from your iPhone and Android! Please provide this summary of care documentation to your next provider. Your primary care clinician is listed as Read Cranker. If you have any questions after today's visit, please call [phone].

## 2018-03-20 NOTE — PATIENT INSTRUCTIONS
1.  Get your cervical spine XRay    2. Have your Prevnar 13 (pneumonia) vaccine given to you at a pharmacy    3.   Schedule your colonoscopy

## 2018-03-20 NOTE — ACP (ADVANCE CARE PLANNING)
patient counseled on use and need of an Advanced Directive. The [place] Advanced Directive for Healthcare template given to patient for review and completion. Patient asked to provide us with a copy once it is completed.     Darshan Lopez, NP

## 2018-03-20 NOTE — PROGRESS NOTES
Subjective:      Steven Erickson is a 78 y.o. female who presents today for [facility] Wellness and evaluaiton of RUE numbness/tingling    Nodule on arm has since disappeared  Did not have ultrasound or imaging done  Occasional pain in location of previous cyst    Last week RUE started tingling, intermittent. Goes all the way down to hands/fingers. Has baseline neuropathy in her fingers  Arm feels tight, no swelling other than her baseline lymphedema (s/p R mastectomy)  Numbness/tingling different from her baseline  No shoulder pain  No weakness in hand or arm  Has been using a new pillow called the \"My pillow\", makes her neck feel \"crazy\". Has recently stopped sleeping on it     + h/o allergies  Taking certirizine and nasacort  Dyspnea reported previous visits has since resolved  Rarely using albuterol     GERD: taking prevacid prn  Follows with Dr. Jeffrey Shafer for colonoscopy- due 2018    Has been watching her pm food intake after 6pm  Has lost 8lb since 09/2017 with decreasing pm food intake     Annual Wellness Visit    End of Life Planning:  Does not have, provided information and will copmlete and return  Info for MyPreventativeCare: Given to patient, see After Visit Summary      Depression Screen: Patient Health Questionnaire (PHQ-2)   Over the last 2 weeks, how often have you been bothered by any of the following problems? Little interest or pleasure in doing things? Several days. [1]  States she is \"lazy   Feeling down, depressed, or hopeless? Not at all. [0]    Total Score: 01/6  PHQ-2 Assessment Scoring:   A score of 2 or more requires further screening with the PHQ-9  N/A      Alcohol Risk Factor Screening: You do not drink alcohol or very rarely. Hearing Loss:  Hearing is good. Activities of Daily Living:  Self-care.    Requires assistance with: no ADLs    Fall Risk:  No fall risk factors    Abuse Screen:  Patient is not abused    Adult Nutrition Screen:  No risk factors noted.       Health maintenance:   Last mammogram: 05/2017  Last DEXA:  09/2017  Last colonoscopy:  2013, s/p polypectomy.  Due 2018  Tdap: UTD  Influenza: UTD  Pneumonia: never had  Shingles: never had    Patient Active Problem List    Diagnosis Date Noted    H/O mammogram 09/01/2017    Chest wall discomfort 07/07/2017    ACP (advance care planning) 03/07/2017    Arthritis of knee, left 01/13/2017    Rib pain on right side 10/05/2016    Sacroiliac joint pain 10/05/2016    Left knee pain 10/05/2016    Pain in right axilla 05/09/2016    CAD (coronary artery disease), native coronary artery 03/01/2016    Degenerative arthritis of left knee 03/01/2016    History of colonoscopy 03/01/2016    Hypercholesteremia 11/16/2015    Essential hypertension 05/11/2015    History of hyperthyroidism 05/11/2015    Neuropathy 10/11/2013    S/P mastectomy 02/05/2013    Lymphedema of arm 01/07/2013    Breast cancer, stage 2 ([facility] [address] 06/21/2012     Current Outpatient Prescriptions   Medication Sig Dispense Refill    cyanocobalamin 1,000 mcg tablet Take 1,000 mcg by mouth daily.  pneumococcal 13 matt conj dip (PREVNAR-13) 0.5 mL syrg injection 0.5 mL by IntraMUSCular route once for 1 dose. 0.5 mL 0    albuterol (PROVENTIL HFA, VENTOLIN HFA, PROAIR HFA) 90 mcg/actuation inhaler Take 2 Puffs by inhalation every four (4) hours as needed for Wheezing. Indications: BRONCHOSPASM PREVENTION 1 Inhaler 3    pravastatin (PRAVACHOL) 80 mg tablet TAKE 1 TABLET NIGHTLY 90 Tab 2    atenolol (TENORMIN) 50 mg tablet TAKE 1 TABLET DAILY 90 Tab 2    amLODIPine (NORVASC) 5 mg tablet Take 1 Tab by mouth daily. 90 Tab 3    benazepril (LOTENSIN) 20 mg tablet Take 1 Tab by mouth daily. 90 Tab 3    lansoprazole (PREVACID) 15 mg capsule Take 15 mg by mouth as needed.  cetirizine (ZYRTEC) 10 mg tablet Take  by mouth.  cholecalciferol, vitamin D3, 2,000 unit tab Take 1,000 Units by mouth.       aspirin (ASPIRIN) 325 mg tablet Take 1 Tab by mouth daily. 1 Tab 0        Review of Systems    Pertinent items are noted in HPI. Objective:     Visit Vitals    /82 (BP 1 Location: Left arm, BP Patient Position: Sitting)    Pulse 65    Temp 99.2 °F (37.3 °C) (Oral)    Resp 15    Ht 5' 2.4\" (1.585 m)    Wt 158 lb 9.6 oz (71.9 kg)    SpO2 98%    BMI 28.64 kg/m2     General appearance: alert, cooperative, no distress, appears stated age  Head: Normocephalic, without obvious abnormality, atraumatic  Neck: supple, symmetrical, trachea midline  Back: no curvature, normal ROM, no step offs, no kyphosis  Lungs: clear to auscultation bilaterally  Heart: regular rate and rhythm  Extremities: extremities normal, no swelling of RUE, normal and equal strength of RUE and R hand  Skin: Skin color, texture, turgor normal. No rashes or lesions  Neurologic: Grossly normal, decreased sensation R hand fingers    Assessment/Plan:     1. Medicare annual wellness visit, subsequent  - completed  - pt to complete ACP and return  - pneumococcal 13 matt conj dip (PREVNAR-13) 0.5 mL syrg injection; 0.5 mL by IntraMUSCular route once for 1 dose. Dispense: 0.5 mL; Refill: 0    2. Numbness and tingling of right upper extremity  - XR SPINE CERV 4 OR 5 V; Future  - VITAMIN B12  - CBC WITH AUTOMATED DIFF  - METABOLIC PANEL, COMPREHENSIVE    3. History of colonoscopy  - REFERRAL TO GASTROENTEROLOGY    4. Encounter for immunization  - pneumococcal 13 matt conj dip (PREVNAR-13) 0.5 mL syrg injection; 0.5 mL by IntraMUSCular route once for 1 dose. Dispense: 0.5 mL; Refill: 0      Advised her to call back or return to office if symptoms worsen/change/persist.  Discussed expected course/resolution/complications of diagnosis in detail with patient. Medication risks/benefits/costs/interactions/alternatives discussed with patient. She was given an after visit summary which includes diagnoses, current medications, & vitals.   She expressed understanding with the diagnosis and plan.    NATALIE Arambula

## 2018-03-21 LAB
ALBUMIN SERPL-MCNC: 4.4 G/DL (ref 3.6–4.8)
ALBUMIN/GLOB SERPL: 1.4 {RATIO} (ref 1.2–2.2)
ALP SERPL-CCNC: 77 IU/L (ref 39–117)
ALT SERPL-CCNC: 8 IU/L (ref 0–32)
AST SERPL-CCNC: 14 IU/L (ref 0–40)
BASOPHILS # BLD AUTO: 0 X10E3/UL (ref 0–0.2)
BASOPHILS NFR BLD AUTO: 1 %
BILIRUB SERPL-MCNC: 0.3 MG/DL (ref 0–1.2)
BUN SERPL-MCNC: 9 MG/DL (ref 8–27)
BUN/CREAT SERPL: 13 (ref 12–28)
CALCIUM SERPL-MCNC: 9.6 MG/DL (ref 8.7–10.3)
CHLORIDE SERPL-SCNC: 102 MMOL/L (ref 96–106)
CO2 SERPL-SCNC: 30 MMOL/L (ref 18–29)
CREAT SERPL-MCNC: 0.69 MG/DL (ref 0.57–1)
EOSINOPHIL # BLD AUTO: 0.3 X10E3/UL (ref 0–0.4)
EOSINOPHIL NFR BLD AUTO: 5 %
ERYTHROCYTE [DISTWIDTH] IN BLOOD BY AUTOMATED COUNT: 14.9 % (ref 12.3–15.4)
GFR SERPLBLD CREATININE-BSD FMLA CKD-EPI: 105 ML/MIN/1.73
GFR SERPLBLD CREATININE-BSD FMLA CKD-EPI: 91 ML/MIN/1.73
GLOBULIN SER CALC-MCNC: 3.1 G/DL (ref 1.5–4.5)
GLUCOSE SERPL-MCNC: 93 MG/DL (ref 65–99)
HCT VFR BLD AUTO: 41 % (ref 34–46.6)
HGB BLD-MCNC: 12.8 G/DL (ref 11.1–15.9)
IMM GRANULOCYTES # BLD: 0 X10E3/UL (ref 0–0.1)
IMM GRANULOCYTES NFR BLD: 0 %
LYMPHOCYTES # BLD AUTO: 2.2 X10E3/UL (ref 0.7–3.1)
LYMPHOCYTES NFR BLD AUTO: 33 %
MCH RBC QN AUTO: 26.1 PG (ref 26.6–33)
MCHC RBC AUTO-ENTMCNC: 31.2 G/DL (ref 31.5–35.7)
MCV RBC AUTO: 84 FL (ref 79–97)
MONOCYTES # BLD AUTO: 0.5 X10E3/UL (ref 0.1–0.9)
MONOCYTES NFR BLD AUTO: 8 %
NEUTROPHILS # BLD AUTO: 3.5 X10E3/UL (ref 1.4–7)
NEUTROPHILS NFR BLD AUTO: 53 %
PLATELET # BLD AUTO: 283 X10E3/UL (ref 150–379)
POTASSIUM SERPL-SCNC: 3.9 MMOL/L (ref 3.5–5.2)
PROT SERPL-MCNC: 7.5 G/DL (ref 6–8.5)
RBC # BLD AUTO: 4.91 X10E6/UL (ref 3.77–5.28)
SODIUM SERPL-SCNC: 143 MMOL/L (ref 134–144)
VIT B12 SERPL-MCNC: 1223 PG/ML (ref 232–1245)
WBC # BLD AUTO: 6.6 X10E3/UL (ref 3.4–10.8)

## 2018-03-23 ENCOUNTER — DOCUMENTATION ONLY (OUTPATIENT)
Dept: INTERNAL MEDICINE CLINIC | Age: 68
End: 2018-03-23

## 2018-03-23 NOTE — PROGRESS NOTES
Received fax from [facility] with Prevnar 13 vaccination. Chart updated & placed to be scanned in.

## 2018-04-05 DIAGNOSIS — I25.10 CORONARY ARTERY DISEASE INVOLVING NATIVE CORONARY ARTERY OF NATIVE HEART WITHOUT ANGINA PECTORIS: ICD-10-CM

## 2018-04-05 DIAGNOSIS — E78.00 HYPERCHOLESTEREMIA: ICD-10-CM

## 2018-04-05 RX ORDER — AMLODIPINE BESYLATE 5 MG/1
5 TABLET ORAL DAILY
Qty: 90 TAB | Refills: 1 | Status: SHIPPED | OUTPATIENT
Start: 2018-04-05 | End: 2018-10-09 | Stop reason: SDUPTHER

## 2018-04-05 RX ORDER — ATENOLOL 50 MG/1
TABLET ORAL
Qty: 90 TAB | Refills: 1 | Status: SHIPPED | OUTPATIENT
Start: 2018-04-05 | End: 2018-10-17 | Stop reason: SDUPTHER

## 2018-04-05 RX ORDER — BENAZEPRIL HYDROCHLORIDE 20 MG/1
20 TABLET ORAL DAILY
Qty: 90 TAB | Refills: 1 | Status: SHIPPED | OUTPATIENT
Start: 2018-04-05 | End: 2018-10-09 | Stop reason: SDUPTHER

## 2018-04-05 RX ORDER — PRAVASTATIN SODIUM 80 MG/1
TABLET ORAL
Qty: 90 TAB | Refills: 1 | Status: SHIPPED | OUTPATIENT
Start: 2018-04-05 | End: 2018-10-17 | Stop reason: SDUPTHER

## 2018-04-05 NOTE — TELEPHONE ENCOUNTER
Requested Prescriptions     Pending Prescriptions Disp Refills    atenolol (TENORMIN) 50 mg tablet 90 Tab 2    benazepril (LOTENSIN) 20 mg tablet 90 Tab 3     Sig: Take 1 Tab by mouth daily.  amLODIPine (NORVASC) 5 mg tablet 90 Tab 3     Sig: Take 1 Tab by mouth daily.     pravastatin (PRAVACHOL) 80 mg tablet 90 Tab 2     Last OV:03/20/18  Next OV:06/21/18           Pharmacy: Lalit Cooley, [facility]

## 2018-05-04 ENCOUNTER — TELEPHONE (OUTPATIENT)
Dept: INTERNAL MEDICINE CLINIC | Age: 68
End: 2018-05-04

## 2018-05-04 NOTE — TELEPHONE ENCOUNTER
She had a colonoscopy with a polyp removed in 2013. This usually dictates a 5 year repeat, however sometimes the GI feels that 10 years is appropriate depending on the pathology    I defer to Dr. Sebastián Velazquez as I don't have the pathology report    Could you please call and let her know that I defer to Dr. Sebastián Velazquez, but she needs to all and check with Dr. Sebastián Velazquez to double check the timing? Thank you!     Go Chu NP

## 2018-05-04 NOTE — TELEPHONE ENCOUNTER
Patient called stating that Ramón Iqbal referred her to  for a colonoscopy. She states that she had one in 2013 and is unsure as to why she would need one. She said that  recommendation was every ten years. Please call patient back at [phone]

## 2018-05-08 NOTE — TELEPHONE ENCOUNTER
Called pt to inform of SSP,NP's message/recommendations listed below. LVM requesting she contact the office back at her convenience to be provided with further information regarding her concerns.

## 2018-05-10 NOTE — TELEPHONE ENCOUNTER
Call to pt - she was informed of SSP,NP's response/recommendations. Pt notes that she will consult with Dr. Ashwini Prado for further information.

## 2018-05-24 ENCOUNTER — TELEPHONE (OUTPATIENT)
Dept: INTERNAL MEDICINE CLINIC | Age: 68
End: 2018-05-24

## 2018-05-24 DIAGNOSIS — Z85.3 HISTORY OF BREAST CANCER: Primary | ICD-10-CM

## 2018-05-24 DIAGNOSIS — Z90.11 STATUS POST RIGHT MASTECTOMY: ICD-10-CM

## 2018-05-24 NOTE — TELEPHONE ENCOUNTER
Patient will be having a 3D mammogram, she currently has a order, but is not for 3D, please put new order in [facility].

## 2018-06-07 ENCOUNTER — HOSPITAL ENCOUNTER (OUTPATIENT)
Dept: MAMMOGRAPHY | Age: 68
Discharge: HOME | End: 2018-06-07
Attending: NURSE PRACTITIONER
Payer: MEDICARE

## 2018-06-07 DIAGNOSIS — Z85.3 HISTORY OF BREAST CANCER: ICD-10-CM

## 2018-06-07 DIAGNOSIS — Z90.11 STATUS POST RIGHT MASTECTOMY: ICD-10-CM

## 2018-06-07 PROCEDURE — 77063 BREAST TOMOSYNTHESIS BI: CPT

## 2018-06-21 ENCOUNTER — HOSPITAL ENCOUNTER (OUTPATIENT)
Dept: GENERAL RADIOLOGY | Age: 68
Discharge: HOME | End: 2018-06-21
Payer: MEDICARE

## 2018-06-21 ENCOUNTER — OFFICE VISIT (OUTPATIENT)
Dept: INTERNAL MEDICINE CLINIC | Age: 68
End: 2018-06-21

## 2018-06-21 ENCOUNTER — TELEPHONE (OUTPATIENT)
Dept: INTERNAL MEDICINE CLINIC | Age: 68
End: 2018-06-21

## 2018-06-21 VITALS
OXYGEN SATURATION: 97 % | HEIGHT: 62 IN | RESPIRATION RATE: 14 BRPM | TEMPERATURE: 99.5 F | DIASTOLIC BLOOD PRESSURE: 72 MMHG | SYSTOLIC BLOOD PRESSURE: 132 MMHG | WEIGHT: 157 LBS | HEART RATE: 63 BPM | BODY MASS INDEX: 28.89 KG/M2

## 2018-06-21 DIAGNOSIS — Z83.3 FAMILY HISTORY OF DIABETES MELLITUS: ICD-10-CM

## 2018-06-21 DIAGNOSIS — C50.911 MALIGNANT NEOPLASM OF RIGHT BREAST, STAGE 2 (HCC): ICD-10-CM

## 2018-06-21 DIAGNOSIS — M54.12 CERVICAL RADICULOPATHY: Primary | ICD-10-CM

## 2018-06-21 DIAGNOSIS — Z86.010 PERSONAL HISTORY OF COLONIC POLYPS: ICD-10-CM

## 2018-06-21 DIAGNOSIS — M79.601 RIGHT ARM PAIN: ICD-10-CM

## 2018-06-21 PROCEDURE — 73090 X-RAY EXAM OF FOREARM: CPT

## 2018-06-21 RX ORDER — PNEUMOCOCCAL 13-VALENT CONJUGATE VACCINE 2.2; 2.2; 2.2; 2.2; 2.2; 4.4; 2.2; 2.2; 2.2; 2.2; 2.2; 2.2; 2.2 UG/.5ML; UG/.5ML; UG/.5ML; UG/.5ML; UG/.5ML; UG/.5ML; UG/.5ML; UG/.5ML; UG/.5ML; UG/.5ML; UG/.5ML; UG/.5ML; UG/.5ML
INJECTION, SUSPENSION INTRAMUSCULAR
Refills: 0 | COMMUNITY
Start: 2018-03-22 | End: 2018-03-22

## 2018-06-21 RX ORDER — GLUCOSAMINE/D3/BOSWELLIA SERRA 1500MG-400
1 TABLET ORAL DAILY
COMMUNITY
End: 2018-10-17

## 2018-06-21 NOTE — MR AVS SNAPSHOT
[address] [facility], [address] [facility] 57 
[phone] Patient: Jeanna Barlow MRN: [id number] OPW:[id number] Visit Information Date & Time Provider Department Dept. Phone Encounter #  
 6/21/2018 10:20 AM [facility] [phone] [id number] Follow-up Instructions Return in about 4 months (around 10/21/2018) for arthritis f/u. Your Appointments 7/12/2018  9:30 AM  
Follow Up with Fatemeh Brown  [facility] Oncology at [facility]) Appt Note: 1 year follow up, breast ca [address] [address] [phone] [address] [facility] [address] Upcoming Health Maintenance Date Due ZOSTER VACCINE AGE 60> 8/5/2011 GLAUCOMA SCREENING Q2Y 10/5/2016 COLONOSCOPY 6/3/2018 Influenza Age 5 to Adult 8/1/2018 Pneumococcal 65+ High/Highest Risk (2 of 2 - PPSV23) 10/4/2018 BREAST CANCER SCRN MAMMOGRAM 6/7/2020 DTaP/Tdap/Td series (2 - Td) 10/4/2023 Allergies as of 6/21/2018  Review Complete On: 6/21/2018 By: Eve Aguilar NP Severity Noted Reaction Type Reactions Penicillins High 05/01/2012    Hives Bactrim [Sulfamethoprim Ds] Low 05/08/2012    Rash Ciprofloxacin (Bulk) Low 05/08/2012    Rash Current Immunizations  Reviewed on 7/7/2017 Name Date Pneumococcal Conjugate (PCV-13) 3/22/2018 Pneumococcal Polysaccharide (PPSV-23) 10/4/2013 Td 11/19/2002 Tdap 10/4/2013 Not reviewed this visit You Were Diagnosed With   
  
 Codes Comments Malignant neoplasm of right breast, stage 2 ([facility] [address]    -  Primary ICD-10-CM: C50.911 ICD-9-CM: 174.9 Personal history of colonic polyps     ICD-10-CM: Z86.010 
ICD-9-CM: V12.72 Right arm pain     ICD-10-CM: O05.548 ICD-9-CM: 729.5 Family history of diabetes mellitus     ICD-10-CM: Z83.3 ICD-9-CM: V18.0 Vitals BP Pulse Temp Resp Height(growth percentile) Weight(growth percentile) 132/72 (BP 1 Location: Left arm, BP Patient Position: Sitting) 63 99.5 °F (37.5 °C) (Oral) 14 5' 2\" (1.575 m) 157 lb (71.2 kg) SpO2 BMI OB Status Smoking Status 97% 28.72 kg/m2 Hysterectomy Never Smoker Vitals History BMI and BSA Data Body Mass Index Body Surface Area 28.72 kg/m 2 1.76 m 2 Preferred Pharmacy Pharmacy Name Phone Lalit Cooley, [facility] [phone] Your Updated Medication List  
  
   
This list is accurate as of 6/21/18 11:00 AM.  Always use your most recent med list.  
  
  
  
  
 albuterol 90 mcg/actuation inhaler Commonly known as:  PROVENTIL HFA, VENTOLIN HFA, PROAIR HFA Take 2 Puffs by inhalation every four (4) hours as needed for Wheezing. Indications: BRONCHOSPASM PREVENTION  
  
 amLODIPine 5 mg tablet Commonly known as:  Haig Denver Take 1 Tab by mouth daily. aspirin 325 mg tablet Commonly known as:  ASPIRIN Take 1 Tab by mouth daily. atenolol 50 mg tablet Commonly known as:  TENORMIN  
TAKE 1 TABLET DAILY  
  
 benazepril 20 mg tablet Commonly known as:  LOTENSIN Take 1 Tab by mouth daily. cholecalciferol (vitamin D3) 2,000 unit Tab Take 1,000 Units by mouth.  
  
 cyanocobalamin 1,000 mcg tablet Take 1,000 mcg by mouth daily. OSTEO BI-FLEX (5-LOXIN) 1,500-400-100 mg-unit-mg Tab Generic drug:  glucosamine-D3-Boswellia serr Take 1 Tab by mouth daily. pravastatin 80 mg tablet Commonly known as:  PRAVACHOL  
TAKE 1 TABLET NIGHTLY  
  
 PREVACID 15 mg capsule Generic drug:  lansoprazole Take 15 mg by mouth as needed. ZyrTEC 10 mg tablet Generic drug:  cetirizine Take  by mouth. We Performed the Following HEMOGLOBIN A1C WITH EAG [56288 CPT(R)] REFERRAL TO GASTROENTEROLOGY [OSE72 Custom] REFERRAL TO ONCOLOGY [YUP93 Custom] Follow-up Instructions Return in about 4 months (around 10/21/2018) for arthritis f/u. To-Do List   
 06/21/2018 Imaging:  XR FOREARM RT AP/LAT Referral Information Referral ID Referred By Referred To  
  
 [id number] RYAN, [address] [facility]   
   [address] [place] [address] Visits Status Start Date End Date 1 New Request 6/21/18 6/21/19 If your referral has a status of pending review or denied, additional information will be sent to support the outcome of this decision. Referral ID Referred By Referred To  
 [id number] Lizbet Guadarrama, DO  
   200 [facility] [place], [facility] Phone: [phone] Fax: [phone] Visits Status Start Date End Date 1 New Request 6/21/18 6/21/19 If your referral has a status of pending review or denied, additional information will be sent to support the outcome of this decision. Referral ID Referred By Referred To  
 [id number] Quinn Santacruz S Not Available Visits Status Start Date End Date 1 New Request 6/21/18 6/21/19 If your referral has a status of pending review or denied, additional information will be sent to support the outcome of this decision. Introducing [facility]! Dear Maryam Hansen: 
Thank you for requesting a Eveo account. Our records indicate that you already have an active Eveo account. You can access your account anytime at [email or web address] Did you know that you can access your hospital and ER discharge instructions at any time in Eveo? You can also review all of your test results from your hospital stay or ER visit. Additional Information If you have questions, please visit the Frequently Asked Questions section of the Eveo website at [email or web address] Remember, MyChart is NOT to be used for urgent needs. For medical emergencies, dial 911. Now available from your iPhone and Android! Please provide this summary of care documentation to your next provider. Your primary care clinician is listed as Janice Meyers. If you have any questions after today's visit, please call [phone].

## 2018-06-21 NOTE — PROGRESS NOTES
Reviewed record in preparation for visit and have obtained necessary documentation. Identified pt with two pt identifiers(name and ).       Health Maintenance Due   Topic    ZOSTER VACCINE AGE 60>     GLAUCOMA SCREENING Q2Y     COLONOSCOPY          Chief Complaint   Patient presents with    Numbness     3 month f/u        Wt Readings from Last 3 Encounters:   18 157 lb (71.2 kg)   18 158 lb 9.6 oz (71.9 kg)   18 162 lb (73.5 kg)     Temp Readings from Last 3 Encounters:   18 99.5 °F (37.5 °C) (Oral)   18 99.2 °F (37.3 °C) (Oral)   18 99.2 °F (37.3 °C) (Oral)     BP Readings from Last 3 Encounters:   18 132/72   18 124/82   18 132/80     Pulse Readings from Last 3 Encounters:   18 63   18 65   18 67           Learning Assessment:  :     Learning Assessment 2018 2017 10/5/2016 2016 2015 2014   PRIMARY LEARNER Patient Patient Patient Patient Patient Patient   HIGHEST LEVEL OF EDUCATION - PRIMARY LEARNER  4 YEARS OF COLLEGE 4 YEARS OF COLLEGE - 4 YEARS OF COLLEGE 4 YEARS OF COLLEGE 4 YEARS OF COLLEGE   BARRIERS PRIMARY LEARNER NONE NONE - NONE NONE NONE   CO-LEARNER CAREGIVER No No - No No No   PRIMARY LANGUAGE ENGLISH ENGLISH ENGLISH ENGLISH ENGLISH ENGLISH    NEED - - - - - No   LEARNER PREFERENCE PRIMARY DEMONSTRATION READING DEMONSTRATION DEMONSTRATION DEMONSTRATION DEMONSTRATION     - - - LISTENING PICTURES -     - - - READING VIDEOS -     - - - VIDEOS READING -     - - - - LISTENING -   LEARNING SPECIAL TOPICS - - - - - no   ANSWERED BY self patient patient patient patient self   RELATIONSHIP SELF SELF SELF SELF SELF SELF       Depression Screening:  :     PHQ over the last two weeks 2018   Little interest or pleasure in doing things Not at all   Feeling down, depressed or hopeless Not at all   Total Score PHQ 2 0       Fall Risk Assessment:  :     Fall Risk Assessment, last 12 mths 2018 Able to walk? Yes   Fall in past 12 months? No   Fall with injury? -   Number of falls in past 12 months -   Fall Risk Score -       Abuse Screening:  :     Abuse Screening Questionnaire 6/21/2018 1/22/2018 11/6/2017 9/1/2017   Do you ever feel afraid of your partner? N N N N   Are you in a relationship with someone who physically or mentally threatens you? N N N N   Is it safe for you to go home? Y Y Y Y       Coordination of Care Questionnaire:  :     1) Have you been to an emergency room, urgent care clinic since your last visit? no   Hospitalized since your last visit? no             2) Have you seen or consulted any other health care providers outside of [address] [place] [address] since your last visit? no  (Include any pap smears or colon screenings in this section.)    3) Do you have an Advance Directive on file? no    4) Are you interested in receiving information on Advance Directives? NO; pt states that she has previously received the documents.

## 2018-06-21 NOTE — TELEPHONE ENCOUNTER
XRay normal    Called patient and reviewed with patient- verbalized understanding    Rachel Fishman, NP

## 2018-06-21 NOTE — PROGRESS NOTES
Subjective:      Larissa Beckett is a 78 y.o. female who presents today for f/u UE numbness and [facility] visit    Right Arm numbness/tingling  Has seen PT intake done, but has not yet had a session. IS scheduled to go to [facility] through [facility]  Has been doing home PT exercises  Numbness/tingling has improved a little bit  C Spine XRay showing Mild spondylosis at C5-6 and Severe right neural foraminal narrowing at C3-4. Mild right neural foraminal  narrowing C4-5. These could be confirmed with MRI    Right forearm pain:  Previously had nodule right lateral forearm, resolved several months ago but patient continues to have intermittent residual pain in same location  Not tender to palpation, pain not worsened with palpation  Not worse with lifting/pushing/pulling    History of Breast Cancer   Right breast, had R mastectomy  Follows with Dr. Gabriel Course  3D mammograms, had done recently normal L breast    Denies chest pain, palpitations, dyspnea or HAs      [facility] STAR ASSESSMENTS    1) Pain Scale: 0 - No pain/10    2) Is patient able to do the following activities of daily living on his/her own? YES   Patient needs assistance with the following:  None    3) In the past 7 days, how many days did the patient exercise? exercises 3 times a week   Did you advise patient to start, increase or maintain physical activity? YES    4) Have physical or emotional problems interfered with patient's social activities? none    5) Depression Screen:   PHQ over the last two weeks 6/21/2018   Little interest or pleasure in doing things Not at all   Feeling down, depressed or hopeless Not at all   Total Score PHQ 2 0       6) Fall Risk Assessment:    Fall Risk Assessment, last 12 mths 6/21/2018   Able to walk? Yes   Fall in past 12 months?  No   Fall with injury? -   Number of falls in past 12 months -   Fall Risk Score -     For patients at risk of falling, did [facility], physical therapy, order a Bone Density Test, or discuss other options? YES  7) Did patient report urinary incontinence? NO  If yes, did you discuss treatment options like bladder training exercises, medication, and/or surgery? NO      Patient Active Problem List    Diagnosis Date Noted    Osteoarthritis of spine with radiculopathy, cervical region 03/20/2018    H/O mammogram 09/01/2017    Chest wall discomfort 07/07/2017    ACP (advance care planning) 03/07/2017    Arthritis of knee, left 01/13/2017    Rib pain on right side 10/05/2016    Sacroiliac joint pain 10/05/2016    Left knee pain 10/05/2016    Pain in right axilla 05/09/2016    CAD (coronary artery disease), native coronary artery 03/01/2016    Degenerative arthritis of left knee 03/01/2016    History of colonoscopy 03/01/2016    Hypercholesteremia 11/16/2015    Essential hypertension 05/11/2015    History of hyperthyroidism 05/11/2015    Neuropathy 10/11/2013    S/P mastectomy 02/05/2013    Lymphedema of arm 01/07/2013    [facility] 06/21/2012     Current Outpatient Prescriptions   Medication Sig Dispense Refill    glucosamine-D3-Boswellia serr (OSTEO BI-FLEX, 5-LOXIN,) 1,500-400-100 mg-unit-mg tab Take 1 Tab by mouth daily.  atenolol (TENORMIN) 50 mg tablet TAKE 1 TABLET DAILY 90 Tab 1    benazepril (LOTENSIN) 20 mg tablet Take 1 Tab by mouth daily. 90 Tab 1    amLODIPine (NORVASC) 5 mg tablet Take 1 Tab by mouth daily. 90 Tab 1    pravastatin (PRAVACHOL) 80 mg tablet TAKE 1 TABLET NIGHTLY 90 Tab 1    cyanocobalamin 1,000 mcg tablet Take 1,000 mcg by mouth daily.  lansoprazole (PREVACID) 15 mg capsule Take 15 mg by mouth as needed.  cetirizine (ZYRTEC) 10 mg tablet Take  by mouth.  cholecalciferol, vitamin D3, 2,000 unit tab Take 1,000 Units by mouth.  aspirin (ASPIRIN) 325 mg tablet Take 1 Tab by mouth daily.  1 Tab 0    albuterol (PROVENTIL HFA, VENTOLIN HFA, PROAIR HFA) 90 mcg/actuation inhaler Take 2 Puffs by inhalation every four (4) hours as needed for Wheezing. Indications: BRONCHOSPASM PREVENTION 1 Inhaler 3        Review of Systems    Pertinent items are noted in HPI. Objective:     Visit Vitals    /72 (BP 1 Location: Left arm, BP Patient Position: Sitting)    Pulse 63    Temp 99.5 °F (37.5 °C) (Oral)    Resp 14    Ht 5' 2\" (1.575 m)    Wt 157 lb (71.2 kg)    SpO2 97%    BMI 28.72 kg/m2     General appearance: alert, cooperative, no distress, appears stated age  Head: Normocephalic, without obvious abnormality, atraumatic  Lungs: clear to auscultation bilaterally  Breasts: right mastectomy  Heart: regular rate and rhythm, S1, S2 normal, no murmur, click, rub or gallop  Extremities: mildly reduced ROM of right shoulder, no palpable nodule or abnormality right forearm  Pulses: 2+ radial pulses bilaterally  Skin: Skin color, texture, turgor normal. No rashes or lesions  Neurologic: Grossly normal    Assessment/Plan:     1. Cervical radiculopathy  - cont working with PT  - improving    2. Right arm pain  - XR FOREARM RT AP/LAT; Future    3. Malignant neoplasm of right breast, stage 2 ([address]  - [facility] ref [facility]  - XR FOREARM RT AP/LAT; Future    4. Family history of diabetes mellitus  - HEMOGLOBIN A1C WITH EAG  - cont regular exercise, pt planning on increasing exercise, improving diet, and weight loss    5. Personal history of colonic polyps  - REFERRAL TO GASTROENTEROLOGY    Advised her to call back or return to office if symptoms worsen/change/persist.  Discussed expected course/resolution/complications of diagnosis in detail with patient. Medication risks/benefits/costs/interactions/alternatives discussed with patient. She was given an after visit summary which includes diagnoses, current medications, & vitals. She expressed understanding with the diagnosis and plan.     NATALIE Chirinos

## 2018-06-22 ENCOUNTER — TELEPHONE (OUTPATIENT)
Dept: INTERNAL MEDICINE CLINIC | Age: 68
End: 2018-06-22

## 2018-06-22 PROBLEM — R73.03 PREDIABETES: Status: ACTIVE | Noted: 2018-06-22

## 2018-06-22 LAB
EST. AVERAGE GLUCOSE BLD GHB EST-MCNC: 131 MG/DL
HBA1C MFR BLD: 6.2 % (ref 4.8–5.6)

## 2018-06-22 NOTE — TELEPHONE ENCOUNTER
Called and spoke with patient    Drinking fruit smoothies every day for breakfast, more than 1 cup of fruit in each smoothie    Reviewed limiting fruit in smoothies to no more than 1/4 cup.   Limit total daily fruit intake to 2 servings    Reviewed simple carbs vs complex carbs with pt    Pt verbalized understanding    Susanne Seals NP

## 2018-06-25 PROBLEM — H43.819 VITREOUS DEGENERATION: Status: ACTIVE | Noted: 2018-06-25

## 2018-07-12 ENCOUNTER — OFFICE VISIT (OUTPATIENT)
Dept: ONCOLOGY | Age: 68
End: 2018-07-12

## 2018-07-12 VITALS
OXYGEN SATURATION: 98 % | RESPIRATION RATE: 16 BRPM | TEMPERATURE: 98.7 F | BODY MASS INDEX: 28.71 KG/M2 | SYSTOLIC BLOOD PRESSURE: 147 MMHG | DIASTOLIC BLOOD PRESSURE: 56 MMHG | HEART RATE: 64 BPM | WEIGHT: 156 LBS | HEIGHT: 62 IN

## 2018-07-12 DIAGNOSIS — M54.2 NECK PAIN: ICD-10-CM

## 2018-07-12 DIAGNOSIS — Z85.3 HISTORY OF BREAST CANCER: Primary | ICD-10-CM

## 2018-07-12 DIAGNOSIS — I10 ESSENTIAL HYPERTENSION: ICD-10-CM

## 2018-07-12 DIAGNOSIS — Z90.11 H/O MASTECTOMY, RIGHT: ICD-10-CM

## 2018-07-12 DIAGNOSIS — M19.90 ARTHRITIS: ICD-10-CM

## 2018-07-12 NOTE — PROGRESS NOTES
Isauro Montoya is a 78 y.o. 1950 female and presents with Breast Cancer    CC  Breast cancer Dx 5/12    STAGE:  2  T2N1a    ER- [place]-Her2 amplified, surgical path her2 neg   mammoprint triple neg    TREATMENT COURSE: right mast 6/14/12. TC 6 done. XRT done 2/13. HPI: Pt here for breast cancer 12 mo f/u for triple neg disease not on any therapy. She continues to do well overall.  mammo good 6/18  Watching grand kid now and loving it. Has arthritis in neck and knees. Sees PCP and has labs done there. Pt has a question about doing HCG level for her breast cancer. Pt is for a colo again. DX   Encounter Diagnoses   Name Primary?  Essential hypertension     H/O mastectomy, right     History of breast cancer Yes    Arthritis     Neck pain       Past Medical History:   Diagnosis Date    Arthritis     toe, back    Asthma     as a teenager    [facility]     Right Breast @ age 62 (2012)    Cancer [facility])      right breast cancer mastectomy    GERD (gastroesophageal reflux disease)     Hypertension     S/P radiotherapy     Right Breast 2012    Thyroid disease     did take meds but now off     Past Surgical History:   Procedure Laterality Date    BREAST SURGERY PROCEDURE UNLISTED  6/14/12    RIGHT BREAST MASTECTOMY WITH RIGHT SENTINEL NODE BIOPSY, PORT A CATH INSERTION WITH ULTRASOUND; INFUSAPORT INSERTION; SENTINEL NODE BIOPSY.     CARDIAC SURG PROCEDURE UNLIST      attempted PTCA    HX BREAST BIOPSY  5/1/12    RIGHT breast biopsy - POSITIVE    HX BREAST BIOPSY  5/22/12    LEFT breast biopsy - BENIGN    HX MASTECTOMY Right     June 2012 w/ Chemo & Radiation    HX ORTHOPAEDIC      4th toe bilaterally    HX PARTIAL HYSTERECTOMY  1983    prolapsed uterus    HX VASCULAR ACCESS      portacath     Social History     Social History    Marital status: SINGLE     Spouse name: N/A    Number of children: N/A    Years of education: N/A     Social History Main Topics    Smoking status: Never Smoker    Smokeless tobacco: Never Used    Alcohol use No    Drug use: No    Sexual activity: Not Currently     Partners: Male     Other Topics Concern    None     Social History Narrative     Family History   Problem Relation Age of Onset    Diabetes Mother     Cancer Father      prostate    Cancer Maternal Aunt      breast cancer; mastectomy, now age 76    Breast Cancer Maternal Aunt      42's    Breast Cancer Maternal Aunt      60's       Current Outpatient Prescriptions   Medication Sig Dispense Refill    glucosamine-D3-Boswellia serr (OSTEO BI-FLEX, 5-LOXIN,) 1,500-400-100 mg-unit-mg tab Take 1 Tab by mouth daily.  atenolol (TENORMIN) 50 mg tablet TAKE 1 TABLET DAILY 90 Tab 1    benazepril (LOTENSIN) 20 mg tablet Take 1 Tab by mouth daily. 90 Tab 1    pravastatin (PRAVACHOL) 80 mg tablet TAKE 1 TABLET NIGHTLY 90 Tab 1    cyanocobalamin 1,000 mcg tablet Take 1,000 mcg by mouth daily.  lansoprazole (PREVACID) 15 mg capsule Take 15 mg by mouth as needed.  cetirizine (ZYRTEC) 10 mg tablet Take  by mouth.  cholecalciferol, vitamin D3, 2,000 unit tab Take 1,000 Units by mouth.  aspirin (ASPIRIN) 325 mg tablet Take 1 Tab by mouth daily. 1 Tab 0    amLODIPine (NORVASC) 5 mg tablet Take 1 Tab by mouth daily. 90 Tab 1    albuterol (PROVENTIL HFA, VENTOLIN HFA, PROAIR HFA) 90 mcg/actuation inhaler Take 2 Puffs by inhalation every four (4) hours as needed for Wheezing. Indications: BRONCHOSPASM PREVENTION 1 Inhaler 3       Allergies   Allergen Reactions    Penicillins Hives    Bactrim [Sulfamethoprim Ds] Rash    Ciprofloxacin (Bulk) Rash       Review of Systems    A comprehensive review of systems was negative except for what is discussed in HPI.      Objective:  Visit Vitals    /56    Pulse 64    Temp 98.7 °F (37.1 °C) (Oral)    Resp 16    Ht 5' 2\" (1.575 m)    Wt 156 lb (70.8 kg)    SpO2 98%    BMI 28.53 kg/m2         Physical Exam:   General appearance - alert, well appearing, and in no distress, oriented to person, place, and time and normal appearing weight  Mental status - alert, oriented to person, place, and time, normal mood, behavior, speech, dress, motor activity, and thought processes. HEENT conj clear  Neck supple  CV- regular  resp  CTA bilat, no wheezes, rales or rhonchi. GI soft, nontender  Ext- no edema noted, skin warm and dry  Neuro -alert, oriented, normal speech, no focal findings or movement disorder noted  Breast:  right mast with XRT changes, left breast with no palpable masses       Diagnostic Imaging     reviewed      Lab Results  Per PCP      Lab Results   Component Value Date/Time    WBC 6.6 03/20/2018 11:55 AM       Lab Results   Component Value Date/Time    Sodium 143 03/20/2018 11:55 AM     Assessment/Plan:     Sean Ash is a 62 y.o. 1950 female and presents with Breast Cancer    1. Stage 2 triple neg Breast cancer s/p mast/ chest wall XRT. NERD. Mammogram 6/18 with no evidence of recurrent disease. Continues to do well clinically. Labs per PCP. 2.  HTN per PCP. 3.  dexa normal.  Per PCP. Follow up in 12 months, sooner if needed. Instructed to call with any questions or concerns. ICD-10-CM ICD-9-CM    1. History of breast cancer Z85.3 V10.3    2. Essential hypertension I10 401.9    3. H/O mastectomy, right Z90.11 V45.71    4. Arthritis M19.90 716.90    5. Neck pain M54.2 723.1      No orders of the defined types were placed in this encounter. continue present plan, call if any problems  There are no Patient Instructions on file for this visit. Follow-up Disposition:  Return in about 1 year (around 7/12/2019).     Flavia Julian, DO

## 2018-10-09 NOTE — TELEPHONE ENCOUNTER
Requested Prescriptions     Pending Prescriptions Disp Refills    benazepril (LOTENSIN) 20 mg tablet 90 Tab 1     Sig: Take 1 Tab by mouth daily.  amLODIPine (NORVASC) 5 mg tablet 90 Tab 1     Sig: Take 1 Tab by mouth daily.      06/21/2018  10/17/2018  [facility]

## 2018-10-09 NOTE — TELEPHONE ENCOUNTER
Last refill- 04.05.18    Last office visit- 6/21/2018  Next office visit/ Future Appointments  Date Time Provider Michelle Cathy   10/17/2018 5:20 PM PREETI Ferreira       Requested Prescriptions     Pending Prescriptions Disp Refills    benazepril (LOTENSIN) 20 mg tablet 90 Tab 1     Sig: Take 1 Tab by mouth daily.  amLODIPine (NORVASC) 5 mg tablet 90 Tab 1     Sig: Take 1 Tab by mouth daily.

## 2018-10-10 RX ORDER — BENAZEPRIL HYDROCHLORIDE 20 MG/1
20 TABLET ORAL DAILY
Qty: 90 TAB | Refills: 3 | Status: SHIPPED | OUTPATIENT
Start: 2018-10-10 | End: 2019-10-24 | Stop reason: SDUPTHER

## 2018-10-10 RX ORDER — AMLODIPINE BESYLATE 5 MG/1
5 TABLET ORAL DAILY
Qty: 90 TAB | Refills: 3 | Status: SHIPPED | OUTPATIENT
Start: 2018-10-10 | End: 2019-10-24 | Stop reason: SDUPTHER

## 2018-10-17 ENCOUNTER — OFFICE VISIT (OUTPATIENT)
Dept: INTERNAL MEDICINE CLINIC | Age: 68
End: 2018-10-17

## 2018-10-17 VITALS
WEIGHT: 158 LBS | TEMPERATURE: 99 F | DIASTOLIC BLOOD PRESSURE: 64 MMHG | OXYGEN SATURATION: 98 % | HEIGHT: 62 IN | SYSTOLIC BLOOD PRESSURE: 128 MMHG | BODY MASS INDEX: 29.08 KG/M2 | HEART RATE: 68 BPM | RESPIRATION RATE: 15 BRPM

## 2018-10-17 DIAGNOSIS — E78.5 HYPERLIPIDEMIA, UNSPECIFIED HYPERLIPIDEMIA TYPE: ICD-10-CM

## 2018-10-17 DIAGNOSIS — M25.561 CHRONIC PAIN OF BOTH KNEES: ICD-10-CM

## 2018-10-17 DIAGNOSIS — G89.29 CHRONIC PAIN OF BOTH KNEES: ICD-10-CM

## 2018-10-17 DIAGNOSIS — E55.9 VITAMIN D DEFICIENCY: ICD-10-CM

## 2018-10-17 DIAGNOSIS — R73.03 PREDIABETES: ICD-10-CM

## 2018-10-17 DIAGNOSIS — I25.10 CORONARY ARTERY DISEASE INVOLVING NATIVE CORONARY ARTERY OF NATIVE HEART WITHOUT ANGINA PECTORIS: ICD-10-CM

## 2018-10-17 DIAGNOSIS — I10 ESSENTIAL HYPERTENSION: Primary | ICD-10-CM

## 2018-10-17 DIAGNOSIS — M25.562 CHRONIC PAIN OF BOTH KNEES: ICD-10-CM

## 2018-10-17 DIAGNOSIS — Z13.29 SCREENING FOR THYROID DISORDER: ICD-10-CM

## 2018-10-17 RX ORDER — ATENOLOL 50 MG/1
TABLET ORAL
Qty: 90 TAB | Refills: 3 | Status: SHIPPED | OUTPATIENT
Start: 2018-10-17 | End: 2018-10-18 | Stop reason: SDUPTHER

## 2018-10-17 RX ORDER — PRAVASTATIN SODIUM 80 MG/1
TABLET ORAL
Qty: 90 TAB | Refills: 3 | Status: SHIPPED | OUTPATIENT
Start: 2018-10-17 | End: 2018-10-18 | Stop reason: SDUPTHER

## 2018-10-17 RX ORDER — LANOLIN ALCOHOL/MO/W.PET/CERES
1 CREAM (GRAM) TOPICAL DAILY
COMMUNITY

## 2018-10-17 RX ORDER — CHOLECALCIFEROL (VITAMIN D3) 125 MCG
3 CAPSULE ORAL AS NEEDED
COMMUNITY

## 2018-10-17 NOTE — PROGRESS NOTES
Patient's identity verified with two patient identifiers (name and date of birth). Reviewed record in preparation for visit and have obtained necessary documentation. 1. Have you been to the ER, urgent care clinic since your last visit? Hospitalized since your last visit? No  2. Have you seen or consulted any other health care providers outside of the [address] [place] [address] since your last visit? Include any pap smears or colon screening. No    Chief Complaint   Patient presents with    Arm Pain     Right, 4 month follow up, tingling has lessened, not resolved, w/ PT. Last PT last week, very tight in neck into shoulder, has exercises to do.  Knee Pain     Bilateral, xyrs, arthritis. Was just Left, now Right as well. Takes Naproxen, w/ relief, if doesn't take BID, swelling & pain returns. Not fasting. Health Maintenance Due   Topic Date Due    Shingrix Vaccine Age 50> (1 of 2)  Patient reports has not had. 10/05/2001    COLONOSCOPY   Done per pt, 8/20/18 Dr. Tommy Jones. 06/03/2018    Influenza Age 5 to Adult   Patient reports has not had. Declines today. 08/01/2018    Pneumococcal 65+ High/Highest Risk (2 of 2 - PPSV23)  Has had, at [address] [place] [address].  10/04/2018       Wt Readings from Last 3 Encounters:   10/17/18 158 lb (71.7 kg)   07/12/18 156 lb (70.8 kg)   06/21/18 157 lb (71.2 kg)     Temp Readings from Last 3 Encounters:   10/17/18 99 °F (37.2 °C) (Oral)   07/12/18 98.7 °F (37.1 °C) (Oral)   06/21/18 99.5 °F (37.5 °C) (Oral)     BP Readings from Last 3 Encounters:   10/17/18 128/64   07/12/18 147/56   06/21/18 132/72     Pulse Readings from Last 3 Encounters:   10/17/18 68   07/12/18 64   06/21/18 63

## 2018-10-17 NOTE — PROGRESS NOTES
Subjective:      Reji Darby is a 80 y.o. female who presents today for f/u cervical radiculopathy and HTN    HTN:  Taking amlodipine, benazapril, atenolol  Denies chest pain, palpitations, dyspnea, headaches, or dizziness    HLD:  Taking pravastatin     Cervical Radiculopathy:  Improving with PT, completed last week. Made big difference. Tingling in RUE has greatly improved  C Spine XRay: mild spondylosis at C5-6 and Severe right neural foraminal narrowing at C3-4. Mild right neural foraminal narrowing C4-5    Bilateral knee pain  Has been having bilateral knee pain, has OA in left knee, right knee has started to hurt lately. Wondering about Physical Therapy     History of Breast Cancer   Right breast, had R mastectomy  Follows with Dr. Daniele Gonsalves, wondering about a [facility] [place] lab for tumor marker screening.   Asked Dr. Daniele Gonsalves about this  Mammogram done in 06/2018 of left breast normal    Had colonoscopy done with Dr. Mariana Glasgow    Patient Active Problem List    Diagnosis Date Noted    Vitamin D deficiency 10/17/2018    H/O mastectomy, right 07/12/2018    Arthritis 07/12/2018    Neck pain 07/12/2018    Vitreous degeneration 06/25/2018    Prediabetes 06/22/2018    Osteoarthritis of spine with radiculopathy, cervical region 03/20/2018    H/O mammogram 09/01/2017    Chest wall discomfort 07/07/2017    ACP (advance care planning) 03/07/2017    Arthritis of knee, left 01/13/2017    Rib pain on right side 10/05/2016    Sacroiliac joint pain 10/05/2016    Left knee pain 10/05/2016    Pain in right axilla 05/09/2016    CAD (coronary artery disease), native coronary artery 03/01/2016    Degenerative arthritis of left knee 03/01/2016    History of colonoscopy 03/01/2016    Hypercholesteremia 11/16/2015    Essential hypertension 05/11/2015    History of hyperthyroidism 05/11/2015    Neuropathy 10/11/2013    S/P mastectomy 02/05/2013    Lymphedema of arm 01/07/2013    Breast cancer, stage 2 ([facility] 06/21/2012 Current Outpatient Medications   Medication Sig Dispense Refill    glucosamine-chondroitin (ARTHX) 500-400 mg cap Take 1 Cap by mouth daily.  naproxen sodium 220 mg cap Take 3 Tabs by mouth as needed.  atenolol (TENORMIN) 50 mg tablet TAKE 1 TABLET DAILY 90 Tab 3    pravastatin (PRAVACHOL) 80 mg tablet TAKE 1 TABLET NIGHTLY 90 Tab 3    benazepril (LOTENSIN) 20 mg tablet Take 1 Tab by mouth daily. 90 Tab 3    amLODIPine (NORVASC) 5 mg tablet Take 1 Tab by mouth daily. 90 Tab 3    cyanocobalamin 1,000 mcg tablet Take 1,000 mcg by mouth daily.  albuterol (PROVENTIL HFA, VENTOLIN HFA, PROAIR HFA) 90 mcg/actuation inhaler Take 2 Puffs by inhalation every four (4) hours as needed for Wheezing. Indications: BRONCHOSPASM PREVENTION 1 Inhaler 3    lansoprazole (PREVACID) 15 mg capsule Take 15 mg by mouth as needed.  cetirizine (ZYRTEC) 10 mg tablet Take  by mouth daily.  cholecalciferol, vitamin D3, 2,000 unit tab Take 1,000 Units by mouth.  aspirin (ASPIRIN) 325 mg tablet Take 1 Tab by mouth daily. 1 Tab 0        Review of Systems    Pertinent items are noted in HPI. Objective:     Visit Vitals  /64 (BP 1 Location: Left arm, BP Patient Position: Sitting)   Pulse 68   Temp 99 °F (37.2 °C) (Oral)   Resp 15   Ht 5' 2\" (1.575 m)   Wt 158 lb (71.7 kg)   SpO2 98%   BMI 28.90 kg/m²     General appearance: alert, cooperative, no distress, appears stated age  Head: Normocephalic, without obvious abnormality, atraumatic  Lungs: clear to auscultation bilaterally  Heart: regular rate and rhythm  Extremities: extremities normal, atraumatic, no cyanosis or edema, steady gait  Neurologic: Grossly normal  Psych: calm, cooperative, appropriate affect      Assessment/Plan:     1. Essential hypertension  - at goal with current meds  - cont current meds  - atenolol (TENORMIN) 50 mg tablet; TAKE 1 TABLET DAILY  Dispense: 90 Tab; Refill: 3    2.  Hyperlipidemia, unspecified hyperlipidemia type  - cont statin daily, tolerating well  - LIPID PANEL  - METABOLIC PANEL, COMPREHENSIVE    3. Coronary artery disease involving native coronary artery of native heart without angina pectoris  - cont current meds  - diet, exercise, heart healthy diet  - pravastatin (PRAVACHOL) 80 mg tablet; TAKE 1 TABLET NIGHTLY  Dispense: 90 Tab; Refill: 3    4. Vitamin D deficiency  - cont daily supplement  - VITAMIN D, 25 HYDROXY    5. Prediabetes  - no current meds  - cont diet and exercise  - METABOLIC PANEL, COMPREHENSIVE  - CBC WITH AUTOMATED DIFF  - HEMOGLOBIN A1C WITH EAG    6. BMI 28.0-28.9,adult  - diet, exercise as tolerated encouraged  - LIPID PANEL  - METABOLIC PANEL, COMPREHENSIVE  - CBC WITH AUTOMATED DIFF  - TSH 3RD GENERATION  - HEMOGLOBIN A1C WITH EAG    7. Chronic pain of both knees  - tylenol prn  - referral to PT per patient request  - REFERRAL TO PHYSICAL THERAPY    8. Screening for thyroid disorder  - TSH 3RD GENERATION      Advised her to call back or return to office if symptoms worsen/change/persist.  Discussed expected course/resolution/complications of diagnosis in detail with patient. Medication risks/benefits/costs/interactions/alternatives discussed with patient. She was given an after visit summary which includes diagnoses, current medications, & vitals. She expressed understanding with the diagnosis and plan.     NATALIE Arauz

## 2018-10-17 NOTE — PATIENT INSTRUCTIONS
Influenza (Flu): Care Instructions  Your Care Instructions    Influenza (flu) is an infection in the lungs and breathing passages. It is caused by the influenza virus. There are different strains, or types, of the flu virus from year to year. Unlike the common cold, the flu comes on suddenly and the symptoms, such as a cough, congestion, fever, chills, fatigue, aches, and pains, are more severe. These symptoms may last up to 10 days. Although the flu can make you feel very sick, it usually doesn't cause serious health problems. Home treatment is usually all you need for flu symptoms. But your doctor may prescribe antiviral medicine to prevent other health problems, such as pneumonia, from developing. Older people and those who have a long-term health condition, such as lung disease, are most at risk for having pneumonia or other health problems. Follow-up care is a key part of your treatment and safety. Be sure to make and go to all appointments, and call your doctor if you are having problems. It's also a good idea to know your test results and keep a list of the medicines you take. How can you care for yourself at home? · Get plenty of rest.  · Drink plenty of fluids, enough so that your urine is light yellow or clear like water. If you have kidney, heart, or liver disease and have to limit fluids, talk with your doctor before you increase the amount of fluids you drink. · Take an over-the-counter pain medicine if needed, such as acetaminophen (Tylenol), ibuprofen (Advil, Motrin), or naproxen (Aleve), to relieve fever, headache, and muscle aches. Read and follow all instructions on the label. No one younger than 20 should take aspirin. It has been linked to Reye syndrome, a serious illness. · Do not smoke. Smoking can make the flu worse. If you need help quitting, talk to your doctor about stop-smoking programs and medicines. These can increase your chances of quitting for good.   · Breathe moist air from a hot shower or from a sink filled with hot water to help clear a stuffy nose. · Before you use cough and cold medicines, check the label. These medicines may not be safe for young children or for people with certain health problems. · If the skin around your nose and lips becomes sore, put some petroleum jelly on the area. · To ease coughing:  ? Drink fluids to soothe a scratchy throat. ? Suck on cough drops or plain hard candy. ? Take an over-the-counter cough medicine that contains dextromethorphan to help you get some sleep. Read and follow all instructions on the label. ? Raise your head at night with an extra pillow. This may help you rest if coughing keeps you awake. · Take any prescribed medicine exactly as directed. Call your doctor if you think you are having a problem with your medicine. To avoid spreading the flu  · Wash your hands regularly, and keep your hands away from your face. · Stay home from school, work, and other public places until you are feeling better and your fever has been gone for at least 24 hours. The fever needs to have gone away on its own without the help of medicine. · Ask people living with you to talk to their doctors about preventing the flu. They may get antiviral medicine to keep from getting the flu from you. · To prevent the flu in the future, get a flu vaccine every fall. Encourage people living with you to get the vaccine. · Cover your mouth when you cough or sneeze. When should you call for help? Call 911 anytime you think you may need emergency care.  For example, call if:    · You have severe trouble breathing.    Call your doctor now or seek immediate medical care if:    · You have new or worse trouble breathing.     · You seem to be getting much sicker.     · You feel very sleepy or confused.     · You have a new or higher fever.     · You get a new rash.    Watch closely for changes in your health, and be sure to contact your doctor if:    · You begin to get better and then get worse.     · You are not getting better after 1 week. Where can you learn more? Go to http://natalie-jeet.info/. Enter I527 in the search box to learn more about \"Influenza (Flu): Care Instructions. \"  Current as of: December 6, 2017  Content Version: 11.8  © 3136-6939 [employer]. Care instructions adapted under license by [employer] (which disclaims liability or warranty for this information). If you have questions about a medical condition or this instruction, always ask your healthcare professional. [place] [address] any warranty or liability for your use of this information.

## 2018-10-18 DIAGNOSIS — I10 ESSENTIAL HYPERTENSION: ICD-10-CM

## 2018-10-18 DIAGNOSIS — I25.10 CORONARY ARTERY DISEASE INVOLVING NATIVE CORONARY ARTERY OF NATIVE HEART WITHOUT ANGINA PECTORIS: ICD-10-CM

## 2018-10-18 NOTE — TELEPHONE ENCOUNTER
Patient was seen yesterday, and 2 scripts were sent to [facility]. The patient states those scripts should have been sent to [facility], please resend script to correct pharmacy.

## 2018-10-18 NOTE — TELEPHONE ENCOUNTER
Provider is out of the office today, will return tomorrow. Prescriptions pended to provider for [facility].

## 2018-10-19 RX ORDER — ATENOLOL 50 MG/1
TABLET ORAL
Qty: 90 TAB | Refills: 3 | Status: SHIPPED | OUTPATIENT
Start: 2018-10-19 | End: 2018-10-29 | Stop reason: SDUPTHER

## 2018-10-19 RX ORDER — PRAVASTATIN SODIUM 80 MG/1
TABLET ORAL
Qty: 90 TAB | Refills: 3 | Status: SHIPPED | OUTPATIENT
Start: 2018-10-19 | End: 2018-10-29 | Stop reason: SDUPTHER

## 2018-10-29 DIAGNOSIS — I10 ESSENTIAL HYPERTENSION: ICD-10-CM

## 2018-10-29 DIAGNOSIS — I25.10 CORONARY ARTERY DISEASE INVOLVING NATIVE CORONARY ARTERY OF NATIVE HEART WITHOUT ANGINA PECTORIS: ICD-10-CM

## 2018-10-29 RX ORDER — ATENOLOL 50 MG/1
TABLET ORAL
Qty: 10 TAB | Refills: 0 | Status: SHIPPED | OUTPATIENT
Start: 2018-10-29 | End: 2019-11-07 | Stop reason: SDUPTHER

## 2018-10-29 RX ORDER — PRAVASTATIN SODIUM 80 MG/1
TABLET ORAL
Qty: 10 TAB | Refills: 0 | Status: SHIPPED | OUTPATIENT
Start: 2018-10-29 | End: 2019-11-26 | Stop reason: SDUPTHER

## 2018-10-29 NOTE — TELEPHONE ENCOUNTER
Both prescriptions originally sent 10/17/18 to [facility] at [facility], on file. Resent 10/19/18 to [facility] x1yr supply. Spoke with Va Castanon at [place], verified patient identifiers x2. States both prescriptions are on file, has not been put through her insurance. States there should be no interference with [facility]. Call to [facility], spoke w/ Abi Dunne, verified patient identifiers x2. Transferred to [facility] for Medicare patients, states there was a hold but they (pharmacist) took it out yesterday. Transferred, [facility], processing Rxs, will take x5-7days to arrive to home. Spoke with patient, two identifiers verified. Advised of above, has x2 tabs left of each. Advised I would have [facility], NP send a short supply to [facility]. Patient verbalizes understanding and agrees.

## 2018-10-29 NOTE — TELEPHONE ENCOUNTER
Requested Prescriptions     Pending Prescriptions Disp Refills    atenolol (TENORMIN) 50 mg tablet 90 Tab 3     Sig: TAKE 1 TABLET DAILY    pravastatin (PRAVACHOL) 80 mg tablet 90 Tab 3     Sig: TAKE 1 TABLET NIGHTLY     Patient called stating that she received a letter from [facility] telling her that they could not fill these medication because it was early. She would like them sent to her local [facility].     10/17/2018  No upcoming

## 2018-12-07 LAB
25(OH)D3+25(OH)D2 SERPL-MCNC: 28.5 NG/ML (ref 30–100)
ALBUMIN SERPL-MCNC: 3.8 G/DL (ref 3.6–4.8)
ALBUMIN/GLOB SERPL: 1.3 {RATIO} (ref 1.2–2.2)
ALP SERPL-CCNC: 69 IU/L (ref 39–117)
ALT SERPL-CCNC: 10 IU/L (ref 0–32)
AST SERPL-CCNC: 11 IU/L (ref 0–40)
BASOPHILS # BLD AUTO: 0 X10E3/UL (ref 0–0.2)
BASOPHILS NFR BLD AUTO: 0 %
BILIRUB SERPL-MCNC: 0.4 MG/DL (ref 0–1.2)
BUN SERPL-MCNC: 11 MG/DL (ref 8–27)
BUN/CREAT SERPL: 17 (ref 12–28)
CALCIUM SERPL-MCNC: 8.9 MG/DL (ref 8.7–10.3)
CHLORIDE SERPL-SCNC: 104 MMOL/L (ref 96–106)
CHOLEST SERPL-MCNC: 154 MG/DL (ref 100–199)
CO2 SERPL-SCNC: 23 MMOL/L (ref 20–29)
CREAT SERPL-MCNC: 0.66 MG/DL (ref 0.57–1)
EOSINOPHIL # BLD AUTO: 0.2 X10E3/UL (ref 0–0.4)
EOSINOPHIL NFR BLD AUTO: 3 %
ERYTHROCYTE [DISTWIDTH] IN BLOOD BY AUTOMATED COUNT: 14.3 % (ref 12.3–15.4)
EST. AVERAGE GLUCOSE BLD GHB EST-MCNC: 131 MG/DL
GLOBULIN SER CALC-MCNC: 3 G/DL (ref 1.5–4.5)
GLUCOSE SERPL-MCNC: 85 MG/DL (ref 65–99)
HBA1C MFR BLD: 6.2 % (ref 4.8–5.6)
HCT VFR BLD AUTO: 38.5 % (ref 34–46.6)
HDLC SERPL-MCNC: 47 MG/DL
HGB BLD-MCNC: 11.9 G/DL (ref 11.1–15.9)
IMM GRANULOCYTES # BLD: 0 X10E3/UL (ref 0–0.1)
IMM GRANULOCYTES NFR BLD: 0 %
INTERPRETATION, 910389: NORMAL
LDLC SERPL CALC-MCNC: 98 MG/DL (ref 0–99)
LYMPHOCYTES # BLD AUTO: 2.1 X10E3/UL (ref 0.7–3.1)
LYMPHOCYTES NFR BLD AUTO: 39 %
MCH RBC QN AUTO: 26.1 PG (ref 26.6–33)
MCHC RBC AUTO-ENTMCNC: 30.9 G/DL (ref 31.5–35.7)
MCV RBC AUTO: 84 FL (ref 79–97)
MONOCYTES # BLD AUTO: 0.6 X10E3/UL (ref 0.1–0.9)
MONOCYTES NFR BLD AUTO: 10 %
NEUTROPHILS # BLD AUTO: 2.6 X10E3/UL (ref 1.4–7)
NEUTROPHILS NFR BLD AUTO: 48 %
PLATELET # BLD AUTO: 272 X10E3/UL (ref 150–379)
POTASSIUM SERPL-SCNC: 4.1 MMOL/L (ref 3.5–5.2)
PROT SERPL-MCNC: 6.8 G/DL (ref 6–8.5)
RBC # BLD AUTO: 4.56 X10E6/UL (ref 3.77–5.28)
SODIUM SERPL-SCNC: 142 MMOL/L (ref 134–144)
TRIGL SERPL-MCNC: 46 MG/DL (ref 0–149)
TSH SERPL DL<=0.005 MIU/L-ACNC: 0.6 UIU/ML (ref 0.45–4.5)
VLDLC SERPL CALC-MCNC: 9 MG/DL (ref 5–40)
WBC # BLD AUTO: 5.4 X10E3/UL (ref 3.4–10.8)

## 2019-01-23 ENCOUNTER — OFFICE VISIT (OUTPATIENT)
Dept: INTERNAL MEDICINE CLINIC | Age: 69
End: 2019-01-23

## 2019-01-23 VITALS
SYSTOLIC BLOOD PRESSURE: 140 MMHG | HEIGHT: 62 IN | WEIGHT: 156.9 LBS | TEMPERATURE: 99.3 F | DIASTOLIC BLOOD PRESSURE: 80 MMHG | OXYGEN SATURATION: 98 % | HEART RATE: 101 BPM | BODY MASS INDEX: 28.87 KG/M2 | RESPIRATION RATE: 16 BRPM

## 2019-01-23 DIAGNOSIS — J00 ACUTE NASOPHARYNGITIS: Primary | ICD-10-CM

## 2019-01-23 DIAGNOSIS — R05.8 PRODUCTIVE COUGH: ICD-10-CM

## 2019-01-23 RX ORDER — FLUTICASONE PROPIONATE 50 MCG
2 SPRAY, SUSPENSION (ML) NASAL DAILY
Qty: 1 BOTTLE | Refills: 0 | Status: SHIPPED | OUTPATIENT
Start: 2019-01-23 | End: 2019-05-14

## 2019-01-23 NOTE — PROGRESS NOTES
Chief Complaint   Patient presents with    Cough     Reviewed record in preparation for visit and have obtained necessary documentation. Identified pt with two pt identifiers(name and ).       Health Maintenance Due   Topic    COLONOSCOPY          Chief Complaint   Patient presents with    Cough        Wt Readings from Last 3 Encounters:   19 156 lb 14.4 oz (71.2 kg)   10/17/18 158 lb (71.7 kg)   18 156 lb (70.8 kg)     Temp Readings from Last 3 Encounters:   19 99.3 °F (37.4 °C) (Oral)   10/17/18 99 °F (37.2 °C) (Oral)   18 98.7 °F (37.1 °C) (Oral)     BP Readings from Last 3 Encounters:   19 140/80   10/17/18 128/64   18 147/56     Pulse Readings from Last 3 Encounters:   19 (!) 101   10/17/18 68   18 64           Learning Assessment:  :     Learning Assessment 2019 2018 2017 10/5/2016 2016 2015 2014   PRIMARY LEARNER Patient Patient Patient Patient Patient Patient Patient   HIGHEST LEVEL OF EDUCATION - PRIMARY LEARNER  4 YEARS OF COLLEGE 4 YEARS OF COLLEGE 4 YEARS OF COLLEGE - 4 YEARS OF COLLEGE 4 YEARS OF COLLEGE 4 YEARS OF COLLEGE   BARRIERS PRIMARY LEARNER NONE NONE NONE - NONE NONE NONE   CO-LEARNER CAREGIVER No No No - No No No   PRIMARY LANGUAGE ENGLISH ENGLISH ENGLISH ENGLISH ENGLISH ENGLISH ENGLISH    NEED - - - - - - No   LEARNER PREFERENCE PRIMARY DEMONSTRATION DEMONSTRATION READING DEMONSTRATION DEMONSTRATION DEMONSTRATION DEMONSTRATION     - - - - LISTENING PICTURES -     - - - - READING VIDEOS -     - - - - VIDEOS READING -     - - - - - LISTENING -   LEARNING SPECIAL TOPICS - - - - - - no   ANSWERED BY patient self patient patient patient patient self   RELATIONSHIP SELF SELF SELF SELF SELF SELF SELF       Depression Screening:  :     PHQ over the last two weeks 2019   Little interest or pleasure in doing things Not at all   Feeling down, depressed, irritable, or hopeless Not at all   Total Score PHQ 2 0       Fall Risk Assessment:  :     Fall Risk Assessment, last 12 mths 1/23/2019   Able to walk? Yes   Fall in past 12 months? No   Fall with injury? -   Number of falls in past 12 months -   Fall Risk Score -       Abuse Screening:  :     Abuse Screening Questionnaire 1/23/2019 6/21/2018 1/22/2018 11/6/2017 9/1/2017   Do you ever feel afraid of your partner? N N N N N   Are you in a relationship with someone who physically or mentally threatens you? N N N N N   Is it safe for you to go home? Y Y Y Y Y       Coordination of Care Questionnaire:  :     1) Have you been to an emergency room, urgent care clinic since your last visit? no   Hospitalized since your last visit? no             2) Have you seen or consulted any other health care providers outside of [address] [place] [address] since your last visit? no  (Include any pap smears or colon screenings in this section.)    3) Do you have an Advance Directive on file? no    4) Are you interested in receiving information on Advance Directives? NO      Patient is accompanied by self I have received verbal consent from Jeanna Barlow to discuss any/all medical information while they are present in the room. Reviewed record  In preparation for visit and have obtained necessary documentation.

## 2019-01-23 NOTE — PROGRESS NOTES
HISTORY OF PRESENT ILLNESS  Usama Hernandez is a 80 y.o. female. Patient reports resolving URI symptoms over the past 2 weeks, but still with productive cough. She is still having light yellow sputum. She denies wheezing, but feels short of breath when she talks too much or gets in a coughing spell. Patient has taken coricidin with some relief. She has a history of asthma and has used her albuterol inhaler with some relief. She has not tried Mucinex. Denies fever, chills, nasal congestion, or body aches. Visit Vitals  /80 (BP 1 Location: Left arm, BP Patient Position: Sitting)   Pulse (!) 101   Temp 99.3 °F (37.4 °C) (Oral)   Resp 16   Ht 5' 2\" (1.575 m)   Wt 156 lb 14.4 oz (71.2 kg)   SpO2 98%   BMI 28.70 kg/m²       HPI    Review of Systems   Respiratory: Positive for cough, sputum production and shortness of breath. Physical Exam   Constitutional: She is oriented to person, place, and time. She appears well-developed and well-nourished. HENT:   Head: Normocephalic. Right Ear: Hearing, tympanic membrane, external ear and ear canal normal.   Left Ear: Hearing, tympanic membrane, external ear and ear canal normal.   Nose: Mucosal edema present. Mouth/Throat: Uvula is midline, oropharynx is clear and moist and mucous membranes are normal.   Neck: Normal range of motion. Neck supple. Cardiovascular: Normal rate, regular rhythm and normal heart sounds. Pulmonary/Chest: Effort normal and breath sounds normal. She has no wheezes. Lymphadenopathy:     She has no cervical adenopathy. Neurological: She is alert and oriented to person, place, and time. Skin: Skin is warm and dry. Psychiatric: She has a normal mood and affect. ASSESSMENT and PLAN    ICD-10-CM ICD-9-CM    1. Acute nasopharyngitis J00 460    2.  Productive cough R05 786.2      Orders Placed This Encounter    fluticasone (FLONASE) 50 mcg/actuation nasal spray   start mucinex and flonase  Hydrate  Consider antibiotic is no improvement in 2-3 days

## 2019-04-23 ENCOUNTER — OFFICE VISIT (OUTPATIENT)
Dept: INTERNAL MEDICINE CLINIC | Age: 69
End: 2019-04-23

## 2019-04-23 VITALS
SYSTOLIC BLOOD PRESSURE: 130 MMHG | WEIGHT: 154.6 LBS | OXYGEN SATURATION: 99 % | BODY MASS INDEX: 28.45 KG/M2 | RESPIRATION RATE: 16 BRPM | HEIGHT: 62 IN | HEART RATE: 64 BPM | TEMPERATURE: 98.7 F | DIASTOLIC BLOOD PRESSURE: 80 MMHG

## 2019-04-23 DIAGNOSIS — J30.89 ENVIRONMENTAL AND SEASONAL ALLERGIES: ICD-10-CM

## 2019-04-23 DIAGNOSIS — I10 ESSENTIAL HYPERTENSION: ICD-10-CM

## 2019-04-23 DIAGNOSIS — H93.8X1 AUDIBLE HEARTBEAT IN EAR, RIGHT: Primary | ICD-10-CM

## 2019-04-23 DIAGNOSIS — R73.03 PREDIABETES: ICD-10-CM

## 2019-04-23 NOTE — PROGRESS NOTES
HISTORY OF PRESENT ILLNESS  Tonja Martel is a 80 y.o. female. Patient reports audible heart beat in right ear x 4 days. It is improved today. She went to [facility] yesterday because she started feeling dizzy and off-balance also. EKG, CBC, and metabolic panel were normal. Her blood pressure was elevated 160/80s. Urine and tsh is pending. Patient denies pain of right ear, but there has been ringing at times. Mild allergy symptoms over the past week. She recently started using zyrtec and flonase daily. Past Medical History:   Diagnosis Date    Arthritis     toe, back    Asthma     as a teenager    Breast cancer ([facility] [address]     Right Breast @ age 62 (2012)    Cancer [facility])      right breast cancer mastectomy    GERD (gastroesophageal reflux disease)     Hypertension     S/P radiotherapy     Right Breast 2012    Thyroid disease     did take meds but now off       Visit Vitals  /80 (BP 1 Location: Left arm, BP Patient Position: Sitting)   Pulse 64   Temp 98.7 °F (37.1 °C) (Oral)   Resp 16   Ht 5' 2\" (1.575 m)   Wt 154 lb 9.6 oz (70.1 kg)   SpO2 99%   BMI 28.28 kg/m²       HPI    Review of Systems   HENT: Positive for tinnitus (heart beat in right ear). Physical Exam   Constitutional: She is oriented to person, place, and time. She appears well-developed and well-nourished. HENT:   Head: Normocephalic. Right Ear: Hearing, external ear and ear canal normal.   Left Ear: Hearing, external ear and ear canal normal.   Nose: Mucosal edema present. Mouth/Throat: Uvula is midline, oropharynx is clear and moist and mucous membranes are normal.   Very mild amount of fluid noted behind TMs   Neck: Normal range of motion. Neck supple. Auscultation of right carotid slightly diminished   Cardiovascular: Normal rate, regular rhythm and normal heart sounds. Pulmonary/Chest: Effort normal and breath sounds normal.   Neurological: She is alert and oriented to person, place, and time.    Skin: Skin is warm and dry.   Psychiatric: She has a normal mood and affect. ASSESSMENT and PLAN    ICD-10-CM ICD-9-CM    1. Audible heartbeat in ear, right H93.8X1 388.8 DUPLEX CAROTID BILATERAL   2. Prediabetes R73.03 790.29    3. Essential hypertension I10 401.9    4. Environmental and seasonal allergies J30.89 477.8      No orders of the defined types were placed in this encounter.   continue flonase and antihistamine  Carotid doppler ordered  Consider ENT referral if no improvement

## 2019-04-23 NOTE — PROGRESS NOTES
Chief Complaint   Patient presents with   Cheryl Shanon in Ear     hear heart beating in ear./ flu [facility] 2019     Reviewed record in preparation for visit and have obtained necessary documentation. Identified pt with two pt identifiers(name and ).       Health Maintenance Due   Topic    COLONOSCOPY     MEDICARE YEARLY EXAM     Pneumococcal 65+ years (2 of 2 - PPSV23)         Chief Complaint   Patient presents with    Ringing in Ear     hear heart beating in ear./ flu [facility] 2019        Wt Readings from Last 3 Encounters:   19 154 lb 9.6 oz (70.1 kg)   19 156 lb 14.4 oz (71.2 kg)   10/17/18 158 lb (71.7 kg)     Temp Readings from Last 3 Encounters:   19 98.7 °F (37.1 °C) (Oral)   19 99.3 °F (37.4 °C) (Oral)   10/17/18 99 °F (37.2 °C) (Oral)     BP Readings from Last 3 Encounters:   19 130/80   19 140/80   10/17/18 128/64     Pulse Readings from Last 3 Encounters:   19 64   19 (!) 101   10/17/18 68           Learning Assessment:  :     Learning Assessment 2019 2018 2017 10/5/2016 2016 2015 2014   PRIMARY LEARNER Patient Patient Patient Patient Patient Patient Patient   HIGHEST LEVEL OF EDUCATION - PRIMARY LEARNER  4 YEARS OF COLLEGE 4 YEARS OF COLLEGE 4 YEARS OF COLLEGE - 4 YEARS OF COLLEGE 4 YEARS OF COLLEGE 4 YEARS OF COLLEGE   BARRIERS PRIMARY LEARNER NONE NONE NONE - NONE NONE NONE   CO-LEARNER CAREGIVER No No No - No No No   PRIMARY LANGUAGE ENGLISH ENGLISH ENGLISH ENGLISH ENGLISH ENGLISH ENGLISH    NEED - - - - - - No   LEARNER PREFERENCE PRIMARY DEMONSTRATION DEMONSTRATION READING DEMONSTRATION DEMONSTRATION DEMONSTRATION DEMONSTRATION     - - - - LISTENING PICTURES -     - - - - READING VIDEOS -     - - - - VIDEOS READING -     - - - - - LISTENING -   LEARNING SPECIAL TOPICS - - - - - - no   ANSWERED BY patient self patient patient patient patient self   RELATIONSHIP SELF SELF SELF SELF SELF SELF SELF       Depression Screening:  :     3 most recent PHQ Screens 1/23/2019   Little interest or pleasure in doing things Not at all   Feeling down, depressed, irritable, or hopeless Not at all   Total Score PHQ 2 0       Fall Risk Assessment:  :     Fall Risk Assessment, last 12 mths 1/23/2019   Able to walk? Yes   Fall in past 12 months? No   Fall with injury? -   Number of falls in past 12 months -   Fall Risk Score -       Abuse Screening:  :     Abuse Screening Questionnaire 1/23/2019 6/21/2018 1/22/2018 11/6/2017 9/1/2017   Do you ever feel afraid of your partner? N N N N N   Are you in a relationship with someone who physically or mentally threatens you? N N N N N   Is it safe for you to go home? Y Y Y Y Y       Coordination of Care Questionnaire:  :     1) Have you been to an emergency room, urgent care clinic since your last visit? no   Hospitalized since your last visit? no             2) Have you seen or consulted any other health care providers outside of [address] [place] [address] since your last visit? yes  (Include any pap smears or colon screenings in this section.)    3) Do you have an Advance Directive on file? no    4) Are you interested in receiving information on Advance Directives? NO      Patient is accompanied by self I have received verbal consent from Luca Ma to discuss any/all medical information while they are present in the room. Reviewed record  In preparation for visit and have obtained necessary documentation.

## 2019-04-25 ENCOUNTER — HOSPITAL ENCOUNTER (OUTPATIENT)
Dept: VASCULAR SURGERY | Age: 69
Discharge: HOME | End: 2019-04-25
Attending: NURSE PRACTITIONER
Payer: MEDICARE

## 2019-04-25 DIAGNOSIS — H93.8X1 AUDIBLE HEARTBEAT IN EAR, RIGHT: ICD-10-CM

## 2019-04-25 PROCEDURE — 93880 EXTRACRANIAL BILAT STUDY: CPT

## 2019-04-26 ENCOUNTER — TELEPHONE (OUTPATIENT)
Dept: INTERNAL MEDICINE CLINIC | Age: 69
End: 2019-04-26

## 2019-04-26 DIAGNOSIS — H93.8X1 AUDIBLE HEARTBEAT IN EAR, RIGHT: Primary | ICD-10-CM

## 2019-04-26 LAB
LEFT CCA DIST DIAS: 16.2 CM/S
LEFT CCA DIST SYS: 65 CM/S
LEFT CCA PROX DIAS: 15.2 CM/S
LEFT CCA PROX SYS: 81.6 CM/S
LEFT ECA DIAS: 5.1 CM/S
LEFT ECA SYS: 34.7 CM/S
LEFT ICA DIST DIAS: 12.5 CM/S
LEFT ICA DIST SYS: 44.5 CM/S
LEFT ICA MID DIAS: 16.7 CM/S
LEFT ICA MID SYS: 50.7 CM/S
LEFT ICA PROX DIAS: 17.8 CM/S
LEFT ICA PROX SYS: 51.5 CM/S
LEFT ICA/CCA SYS: 0.79
LEFT VERTEBRAL DIAS: 10.78 CM/S
LEFT VERTEBRAL SYS: 37 CM/S
RIGHT CCA DIST DIAS: 17.5 CM/S
RIGHT CCA DIST SYS: 69 CM/S
RIGHT CCA PROX DIAS: 17.7 CM/S
RIGHT CCA PROX SYS: 86.9 CM/S
RIGHT ECA DIAS: 7.9 CM/S
RIGHT ECA SYS: 64.2 CM/S
RIGHT ICA DIST DIAS: 23.8 CM/S
RIGHT ICA DIST SYS: 59.2 CM/S
RIGHT ICA MID DIAS: 16 CM/S
RIGHT ICA MID SYS: 51.4 CM/S
RIGHT ICA PROX DIAS: 11.7 CM/S
RIGHT ICA PROX SYS: 37.3 CM/S
RIGHT ICA/CCA SYS: 0.9
RIGHT VERTEBRAL DIAS: 13.94 CM/S
RIGHT VERTEBRAL SYS: 47.9 CM/S

## 2019-04-26 NOTE — TELEPHONE ENCOUNTER
Patient notified of carotid doppler results. Advised follow-up with ENT. She states she is still hearing an echo in right ear and still hears heartbeat in right ear. She is still taking flonase and antihistamine and will continue. Referral placed for Dr. Eloisa Vaughn.

## 2019-05-14 ENCOUNTER — OFFICE VISIT (OUTPATIENT)
Dept: INTERNAL MEDICINE CLINIC | Age: 69
End: 2019-05-14

## 2019-05-14 VITALS
TEMPERATURE: 100.1 F | DIASTOLIC BLOOD PRESSURE: 80 MMHG | SYSTOLIC BLOOD PRESSURE: 130 MMHG | WEIGHT: 153.4 LBS | OXYGEN SATURATION: 98 % | HEIGHT: 62 IN | RESPIRATION RATE: 16 BRPM | BODY MASS INDEX: 28.23 KG/M2 | HEART RATE: 71 BPM

## 2019-05-14 DIAGNOSIS — R05.9 COUGH: ICD-10-CM

## 2019-05-14 DIAGNOSIS — J06.9 VIRAL UPPER RESPIRATORY TRACT INFECTION: Primary | ICD-10-CM

## 2019-05-14 RX ORDER — TRIAMCINOLONE ACETONIDE 55 UG/1
2 SPRAY, METERED NASAL DAILY
COMMUNITY

## 2019-05-14 NOTE — PROGRESS NOTES
Chief Complaint   Patient presents with    Cough     x 3 days     Reviewed record in preparation for visit and have obtained necessary documentation. Identified pt with two pt identifiers(name and ).       Health Maintenance Due   Topic    COLONOSCOPY     MEDICARE YEARLY EXAM     Pneumococcal 65+ years (2 of 2 - PPSV23)         Chief Complaint   Patient presents with    Cough     x 3 days        Wt Readings from Last 3 Encounters:   19 153 lb 6.4 oz (69.6 kg)   19 154 lb 9.6 oz (70.1 kg)   19 156 lb 14.4 oz (71.2 kg)     Temp Readings from Last 3 Encounters:   19 100.1 °F (37.8 °C) (Oral)   19 98.7 °F (37.1 °C) (Oral)   19 99.3 °F (37.4 °C) (Oral)     BP Readings from Last 3 Encounters:   19 130/80   19 130/80   19 140/80     Pulse Readings from Last 3 Encounters:   19 71   19 64   19 (!) 101           Learning Assessment:  :     Learning Assessment 2019 2018 2017 10/5/2016 2016 2015 2014   PRIMARY LEARNER Patient Patient Patient Patient Patient Patient Patient   HIGHEST LEVEL OF EDUCATION - PRIMARY LEARNER  4 YEARS OF COLLEGE 4 YEARS OF COLLEGE 4 YEARS OF COLLEGE - 4 YEARS OF COLLEGE 4 YEARS OF COLLEGE 4 YEARS OF COLLEGE   BARRIERS PRIMARY LEARNER NONE NONE NONE - NONE NONE NONE   CO-LEARNER CAREGIVER No No No - No No No   PRIMARY LANGUAGE ENGLISH ENGLISH ENGLISH ENGLISH ENGLISH ENGLISH ENGLISH    NEED - - - - - - No   LEARNER PREFERENCE PRIMARY DEMONSTRATION DEMONSTRATION READING DEMONSTRATION DEMONSTRATION DEMONSTRATION DEMONSTRATION     - - - - LISTENING PICTURES -     - - - - READING VIDEOS -     - - - - VIDEOS READING -     - - - - - LISTENING -   LEARNING SPECIAL TOPICS - - - - - - no   ANSWERED BY patient self patient patient patient patient self   RELATIONSHIP SELF SELF SELF SELF SELF SELF SELF       Depression Screening:  :     3 most recent PHQ Screens 2019   Little interest or pleasure in doing things Not at all   Feeling down, depressed, irritable, or hopeless Not at all   Total Score PHQ 2 0       Fall Risk Assessment:  :     Fall Risk Assessment, last 12 mths 1/23/2019   Able to walk? Yes   Fall in past 12 months? No   Fall with injury? -   Number of falls in past 12 months -   Fall Risk Score -       Abuse Screening:  :     Abuse Screening Questionnaire 1/23/2019 6/21/2018 1/22/2018 11/6/2017 9/1/2017   Do you ever feel afraid of your partner? N N N N N   Are you in a relationship with someone who physically or mentally threatens you? N N N N N   Is it safe for you to go home? Y Y Y Y Y       Coordination of Care Questionnaire:  :     1) Have you been to an emergency room, urgent care clinic since your last visit? no   Hospitalized since your last visit? no             2) Have you seen or consulted any other health care providers outside of [address] [place] [address] since your last visit? no  (Include any pap smears or colon screenings in this section.)    3) Do you have an Advance Directive on file? no    4) Are you interested in receiving information on Advance Directives? NO      Patient is accompanied by self I have received verbal consent from Malorie Shane to discuss any/all medical information while they are present in the room. Reviewed record  In preparation for visit and have obtained necessary documentation.

## 2019-05-14 NOTE — PROGRESS NOTES
HISTORY OF PRESENT ILLNESS  Sarai Castaneda is a 80 y.o. female. Patient reports cough x 3 days. Denies sore throat, fever, chills, but presents with low-grade fever today. She has felt slight fatigue and mild congestion. Has taken mucinex, nasacort, and antihistamine with little relief. Cough produces cloudy sputum. Visit Vitals  /80 (BP 1 Location: Left arm, BP Patient Position: Sitting)   Pulse 71   Temp 100.1 °F (37.8 °C) (Oral)   Resp 16   Ht 5' 2\" (1.575 m)   Wt 153 lb 6.4 oz (69.6 kg)   SpO2 98%   BMI 28.06 kg/m²       HPI    Review of Systems   Respiratory: Positive for cough and sputum production. Physical Exam   Constitutional: She is oriented to person, place, and time. She appears well-developed and well-nourished. HENT:   Head: Normocephalic. Right Ear: Hearing, tympanic membrane, external ear and ear canal normal.   Left Ear: Hearing, tympanic membrane, external ear and ear canal normal.   Nose: Mucosal edema present. Mouth/Throat: Uvula is midline, oropharynx is clear and moist and mucous membranes are normal.   Neck: Normal range of motion. Neck supple. Cardiovascular: Normal rate, regular rhythm and normal heart sounds. Pulmonary/Chest: Effort normal and breath sounds normal.   Neurological: She is alert and oriented to person, place, and time. Skin: Skin is warm and dry. Psychiatric: She has a normal mood and affect. ASSESSMENT and PLAN    ICD-10-CM ICD-9-CM    1. Viral upper respiratory tract infection J06.9 465.9    2. Cough R05 786.2      No orders of the defined types were placed in this encounter.   tylenol for fever  Continue mucinex with plenty of water  Continue nasacort and antihistamine

## 2019-07-15 ENCOUNTER — OFFICE VISIT (OUTPATIENT)
Dept: ONCOLOGY | Age: 69
End: 2019-07-15

## 2019-07-15 VITALS
HEIGHT: 62 IN | TEMPERATURE: 98.2 F | OXYGEN SATURATION: 97 % | HEART RATE: 68 BPM | BODY MASS INDEX: 28.3 KG/M2 | SYSTOLIC BLOOD PRESSURE: 127 MMHG | DIASTOLIC BLOOD PRESSURE: 74 MMHG | WEIGHT: 153.8 LBS

## 2019-07-15 DIAGNOSIS — Z85.3 HISTORY OF BREAST CANCER: Primary | ICD-10-CM

## 2019-07-15 DIAGNOSIS — M19.90 ARTHRITIS: ICD-10-CM

## 2019-07-15 DIAGNOSIS — E55.9 VITAMIN D DEFICIENCY: ICD-10-CM

## 2019-07-15 DIAGNOSIS — I10 ESSENTIAL HYPERTENSION: ICD-10-CM

## 2019-07-15 DIAGNOSIS — Z90.11 H/O MASTECTOMY, RIGHT: ICD-10-CM

## 2019-07-15 NOTE — PROGRESS NOTES
Miracle Wilson is a 80 y.o. 1950 female and presents with Follow-up (Breast Cancer)    CC: Breast Cancer Dx 5/12    STAGE:  2  T2N1a, ER- [place]-Her2 amplified, surgical path her2 neg mammoprint triple neg    TREATMENT COURSE:  · Right mastectomy 6/14/12  · TC x6 (8/27/12-12/10/12)  · XRT done 2/13    HPI: Patient is here today for breast cancer 12 mo f/u for triple neg disease not on any therapy. She continues to do well overall. She reports that she feels well. She has no new concerns or complaints today. She watches her two year old grandson, and this is exhausting. Mammo good 6/18 and scheduled for 8/9/19. Labs stable/good with PCP 12/18. She is here alone today. Last Visit:  Watching grand kid now and loving it. Has arthritis in neck and knees. Sees PCP and has labs done there. Patient has a question about doing HCG level for her breast cancer. Patient is for a colo again. DX:   Encounter Diagnoses   Name Primary?  History of breast cancer Yes    H/O mastectomy, right     Essential hypertension     Arthritis     Vitamin D deficiency       Past Medical History:   Diagnosis Date    Arthritis     toe, back    Asthma     as a teenager    Breast cancer ([facility] [address]     Right Breast @ age 62 (2012)    Cancer [facility])      right breast cancer mastectomy    GERD (gastroesophageal reflux disease)     Hypertension     S/P radiotherapy     Right Breast 2012    Thyroid disease     did take meds but now off     Past Surgical History:   Procedure Laterality Date    BREAST SURGERY PROCEDURE UNLISTED  6/14/12    RIGHT BREAST MASTECTOMY WITH RIGHT SENTINEL NODE BIOPSY, PORT A CATH INSERTION WITH ULTRASOUND; INFUSAPORT INSERTION; SENTINEL NODE BIOPSY.     CARDIAC SURG PROCEDURE UNLIST      attempted PTCA    HX BREAST BIOPSY  5/1/12    RIGHT breast biopsy - POSITIVE    HX BREAST BIOPSY  5/22/12    LEFT breast biopsy - BENIGN    HX MASTECTOMY Right     June 2012 w/ Chemo & Radiation    HX ORTHOPAEDIC 4th toe bilaterally    HX PARTIAL HYSTERECTOMY  1983    prolapsed uterus    HX VASCULAR ACCESS      portacath     Social History     Socioeconomic History    Marital status: SINGLE     Spouse name: Not on file    Number of children: Not on file    Years of education: Not on file    Highest education level: Not on file   Tobacco Use    Smoking status: Never Smoker    Smokeless tobacco: Never Used   Substance and Sexual Activity    Alcohol use: No     Alcohol/week: 0.0 oz    Drug use: No     Types: Prescription, OTC    Sexual activity: Not Currently     Partners: Male     Family History   Problem Relation Age of Onset    Diabetes Mother     Cancer Father         prostate    Cancer Maternal Aunt         breast cancer; mastectomy, now age 76    Breast Cancer Maternal Aunt         42's    Breast Cancer Maternal Aunt         60's     Current Outpatient Medications   Medication Sig Dispense Refill    triamcinolone (NASACORT AQ) 55 mcg nasal inhaler 2 Sprays daily.  atenolol (TENORMIN) 50 mg tablet TAKE 1 TABLET DAILY 10 Tab 0    pravastatin (PRAVACHOL) 80 mg tablet TAKE 1 TABLET NIGHTLY 10 Tab 0    glucosamine-chondroitin (ARTHX) 500-400 mg cap Take 1 Cap by mouth daily.  benazepril (LOTENSIN) 20 mg tablet Take 1 Tab by mouth daily. 90 Tab 3    amLODIPine (NORVASC) 5 mg tablet Take 1 Tab by mouth daily. 90 Tab 3    cyanocobalamin 1,000 mcg tablet Take 1,000 mcg by mouth daily.  cetirizine (ZYRTEC) 10 mg tablet Take  by mouth daily.  cholecalciferol, vitamin D3, 2,000 unit tab Take 1,000 Units by mouth.  aspirin (ASPIRIN) 325 mg tablet Take 1 Tab by mouth daily. 1 Tab 0    naproxen sodium 220 mg cap Take 3 Tabs by mouth as needed.  albuterol (PROVENTIL HFA, VENTOLIN HFA, PROAIR HFA) 90 mcg/actuation inhaler Take 2 Puffs by inhalation every four (4) hours as needed for Wheezing.  Indications: BRONCHOSPASM PREVENTION 1 Inhaler 3    lansoprazole (PREVACID) 15 mg capsule Take 15 mg by mouth as needed. Allergies   Allergen Reactions    Penicillins Hives    Bactrim [Sulfamethoprim Ds] Rash    Ciprofloxacin (Bulk) Rash     Review of Systems    A comprehensive review of systems was negative except for what is discussed in HPI. Objective:  Visit Vitals  /74 (BP 1 Location: Left arm, BP Patient Position: Sitting)   Pulse 68   Temp 98.2 °F (36.8 °C) (Oral)   Ht 5' 2\" (1.575 m)   Wt 153 lb 12.8 oz (69.8 kg)   SpO2 97%   BMI 28.13 kg/m²     Physical Exam:   General appearance - Alert, well appearing, and in no distress, oriented to person, place, and time and normal appearing weight  Mental status - Alert, oriented to person, place, and time, normal mood, behavior, speech, dress, motor activity, and thought processes. HEENT - Conj clear  Neck - Supple  CV - Regular  Resp - CTA bilat, no wheezes, rales or rhonchi. GI - Soft, nontender  Ext - No edema noted, skin warm and dry  Neuro - Alert, oriented, normal speech, no focal findings or movement disorder noted  Breast - Right mast with well healed scars/no masses, left breast with no palpable masses     Diagnostic Imaging Reviewed    [facility] Results (most recent):  Results from Hospital Encounter encounter on 06/07/18   [facility] 3D MARIO W MAMMO LT SCREENING INCL CAD    Narrative STUDY: Left unilateral digital screening mammogram with 3-D tomosynthesis    INDICATION:  Screening. Status post right mastectomy in 2012 followed by  chemotherapy and radiation therapy for breast cancer. COMPARISON:  Prior studies dating back to 2012, most recently 5/15/2017    BREAST COMPOSITION:  The breasts are heterogeneously dense, which may obscure  small masses. FINDINGS: Left unilateral digital screening mammography was performed and is  interpreted in conjunction with a computer assisted detection (CAD) system. Additionally, tomosynthesis of the left breast in the CC and MLO projections was  performed.  No suspicious masses or calcifications are identified. There has been  no significant change. Impression IMPRESSION:  BI-RADS 1: Negative. No mammographic evidence of malignancy. RECOMMENDATIONS:  Next screening mammogram of the left breast is recommended in one year. The patient will be notified of these results. DEXA Results (most recent):  Results from Hospital Encounter encounter on 09/20/17   DEXA BONE DENSITY STUDY AXIAL    Narrative Bone Mineral Density     Indication: Osteoporosis screening  Age: 73  Sex: Female. Menopause status: postmenopausal.  Hormone replacement therapy: No     Risk factors for fall:   None. Risk factors for osteoporosis:  None. Current medication for osteoporosis: Calcium and vitamin D. Comparison: None. Technique: Imaging was performed on the [employer]. Excluded sites: Lumbar spine due to degenerative changes      Findings: Total hip: Right  Bone mineral density (gm/cm2):  1.156  % of peak bone mass: 115  % for age matched controls:  80  T score: 1.2  Z score:  1.2    Femoral neck: Left  Bone mineral density (gm/cm2):  1.048  % of peak bone mass: 101  % for age matched controls:  80  T score: 0.1  Z score:  0.5    Forearm: Right  Bone mineral density (gm/cm2):  0.876  % of peak bone mass: 99  % for age matched controls:  80  T score: -0.1  Z score:  0.6               Impression Impression: This patient is normal using the World Health Organization criteria       Lab Results  Per PCP    Lab Results   Component Value Date/Time    WBC 5.4 12/06/2018 12:41 PM     Lab Results   Component Value Date/Time    Sodium 142 12/06/2018 12:41 PM     Assessment/Plan:    1. Stage 2 triple neg Breast cancer s/p mast/ chest wall XRT. Mammogram 6/18 with no evidence of recurrent disease. NERD. Mammogram scheduled for 8/9/19. Continues to do well clinically. Labs per PCP 12/18. 2.  HTN. BP good today. Management per PCP. 3.  Bone Health.  DEXA normal 9/17.  Management per PCP. 4.  Vitamin D Deficiency. On Vitamin D, management per PCP. Follow up in 12 months, sooner if needed. Instructed to call with any questions or concerns. This patient was seen in conjunction with Diego Ariza NP.     Lopez Hurtado, DO

## 2019-07-15 NOTE — PROGRESS NOTES
Ace Daily is a 80 y.o. female  Chief Complaint   Patient presents with    Follow-up     Breast Cancer     1. Have you been to the ER, urgent care clinic since your last visit? Hospitalized since your last visit? Yes, for nausea and feeling dizziness. 2. Have you seen or consulted any other health care providers outside of the [address] [place] [address] since your last visit? Include any pap smears or colon screening.   No.

## 2019-07-15 NOTE — LETTER
7/15/19 Patient: Amanda Baer YOB: 1950 Date of Visit: 7/15/2019 Ciro Kapoor NP 
[address] [place] [address] [address] [facility] 7 [id number] VIA In Basket Dear Ciro Kapoor NP, Thank you for referring Ms. Con Lewis to [address] [place] [address] for evaluation. My notes for this consultation are attached. If you have questions, please do not hesitate to call me. I look forward to following your patient along with you. Sincerely, Glen Polo, DO

## 2019-08-09 ENCOUNTER — HOSPITAL ENCOUNTER (OUTPATIENT)
Dept: MAMMOGRAPHY | Age: 69
Discharge: HOME | End: 2019-08-09
Attending: NURSE PRACTITIONER
Payer: MEDICARE

## 2019-08-09 DIAGNOSIS — Z12.39 BREAST SCREENING, UNSPECIFIED: ICD-10-CM

## 2019-08-09 PROCEDURE — 77063 BREAST TOMOSYNTHESIS BI: CPT

## 2019-08-16 ENCOUNTER — OFFICE VISIT (OUTPATIENT)
Dept: INTERNAL MEDICINE CLINIC | Age: 69
End: 2019-08-16

## 2019-08-16 VITALS
WEIGHT: 152 LBS | SYSTOLIC BLOOD PRESSURE: 122 MMHG | HEIGHT: 62 IN | TEMPERATURE: 98.2 F | HEART RATE: 66 BPM | BODY MASS INDEX: 27.97 KG/M2 | OXYGEN SATURATION: 99 % | RESPIRATION RATE: 18 BRPM | DIASTOLIC BLOOD PRESSURE: 80 MMHG

## 2019-08-16 DIAGNOSIS — M54.9 BACK PAIN, UNSPECIFIED BACK LOCATION, UNSPECIFIED BACK PAIN LATERALITY, UNSPECIFIED CHRONICITY: ICD-10-CM

## 2019-08-16 DIAGNOSIS — R82.90 ABNORMAL URINALYSIS: ICD-10-CM

## 2019-08-16 DIAGNOSIS — L65.9 HAIR LOSS: ICD-10-CM

## 2019-08-16 DIAGNOSIS — M53.3 SI (SACROILIAC) JOINT DYSFUNCTION: Primary | ICD-10-CM

## 2019-08-16 LAB
BILIRUB UR QL STRIP: NEGATIVE
GLUCOSE UR-MCNC: NEGATIVE MG/DL
KETONES P FAST UR STRIP-MCNC: NEGATIVE MG/DL
PH UR STRIP: 6 [PH] (ref 4.6–8)
PROT UR QL STRIP: NEGATIVE
SP GR UR STRIP: 1 (ref 1–1.03)
UA UROBILINOGEN AMB POC: NORMAL (ref 0.2–1)
URINALYSIS CLARITY POC: CLEAR
URINALYSIS COLOR POC: YELLOW
URINE BLOOD POC: NEGATIVE
URINE LEUKOCYTES POC: NORMAL
URINE NITRITES POC: NEGATIVE

## 2019-08-16 NOTE — PROGRESS NOTES
1. Have you been to the ER, urgent care clinic since your last visit? Hospitalized since your last visit? No    2. Have you seen or consulted any other health care providers outside of the [address] [place] [address] since your last visit? Include any pap smears or colon screening.  No

## 2019-08-16 NOTE — PROGRESS NOTES
HISTORY OF PRESENT ILLNESS  Elizabeth Castaneda is a 80 y.o. female. Patient reports bilateral low back pain with knee pain over the past year or two, but worsening left-sided back pain the past month. She tends to sleep in a recliner because that is more comfortable than the bed. She has taken NSAIDS, which help but she doesn't want to take them all the time. Pain does not radiate down the leg, but reports it has moved to left lower flank lately. Patient reports arthritis in both knees and feels her left is worse than her right. She does tend to compensate for that when she walks. No swelling. No known injury. Denies any urinary pain or frequency. Patient also requests referral to dermatology for hair loss and bald spots. This has been more noticeable the past month. She has strong family history of hair loss. Visit Vitals  /80 (BP 1 Location: Left arm, BP Patient Position: Sitting)   Pulse 66   Temp 98.2 °F (36.8 °C) (Oral)   Resp 18   Ht 5' 2\" (1.575 m)   Wt 152 lb (68.9 kg)   SpO2 99%   BMI 27.80 kg/m²       HPI    Review of Systems   Musculoskeletal: Positive for back pain. Physical Exam   Constitutional: She is oriented to person, place, and time. She appears well-developed and well-nourished. Musculoskeletal:        Lumbar back: She exhibits tenderness (left and right SI joint tenderness; pain radiates to left lower posterior flank) and pain. She exhibits normal range of motion and no swelling. Left ankle and hip align approximately 0.5 in higher than right   Neurological: She is alert and oriented to person, place, and time. Skin: Skin is warm and dry. Psychiatric: She has a normal mood and affect.    areas of balding noted to scalp and overall thinning of hair  Results for orders placed or performed in visit on 08/16/19   AMB POC URINALYSIS DIP STICK AUTO W/O MICRO   Result Value Ref Range    Color (UA POC) Yellow     Clarity (UA POC) Clear     Glucose (UA POC) Negative Negative    Bilirubin (UA POC) Negative Negative    Ketones (UA POC) Negative Negative    Specific gravity (UA POC) 1.005 1.001 - 1.035    Blood (UA POC) Negative Negative    pH (UA POC) 6.0 4.6 - 8.0    Protein (UA POC) Negative Negative    Urobilinogen (UA POC) 0.2 mg/dL 0.2 - 1    Nitrites (UA POC) Negative Negative    Leukocyte esterase (UA POC) 1+ Negative       ASSESSMENT and PLAN    ICD-10-CM ICD-9-CM    1. SI (sacroiliac) joint dysfunction M53.3 724.6 REFERRAL TO PHYSICAL THERAPY   2. Hair loss L65.9 704.00 REFERRAL TO DERMATOLOGY   3. Back pain, unspecified back location, unspecified back pain laterality, unspecified chronicity M54.9 724.5 AMB POC URINALYSIS DIP STICK AUTO W/O MICRO   4.  Abnormal urinalysis R82.90 791.9 CULTURE, URINE     Orders Placed This Encounter    CULTURE, URINE    REFERRAL TO PHYSICAL THERAPY    REFERRAL TO DERMATOLOGY    AMB POC URINALYSIS DIP STICK AUTO W/O MICRO    COD LIVER OIL PO   referral to PT  Stretching exercises advised  Ice as needed x 20 minute intervals  NSAIDS OTC PRN for pain  Refer to derm for hair loss

## 2019-08-18 LAB — BACTERIA UR CULT: NO GROWTH

## 2019-10-24 ENCOUNTER — OFFICE VISIT (OUTPATIENT)
Dept: INTERNAL MEDICINE CLINIC | Age: 69
End: 2019-10-24

## 2019-10-24 VITALS
BODY MASS INDEX: 28.43 KG/M2 | HEART RATE: 60 BPM | DIASTOLIC BLOOD PRESSURE: 72 MMHG | SYSTOLIC BLOOD PRESSURE: 148 MMHG | OXYGEN SATURATION: 100 % | RESPIRATION RATE: 14 BRPM | TEMPERATURE: 98.3 F | HEIGHT: 62 IN | WEIGHT: 154.5 LBS

## 2019-10-24 DIAGNOSIS — R94.6 BORDERLINE ABNORMAL TFTS: ICD-10-CM

## 2019-10-24 DIAGNOSIS — E55.9 VITAMIN D DEFICIENCY: ICD-10-CM

## 2019-10-24 DIAGNOSIS — R73.03 PREDIABETES: ICD-10-CM

## 2019-10-24 DIAGNOSIS — M54.50 CHRONIC LEFT-SIDED LOW BACK PAIN WITHOUT SCIATICA: ICD-10-CM

## 2019-10-24 DIAGNOSIS — I10 ESSENTIAL HYPERTENSION: Primary | ICD-10-CM

## 2019-10-24 DIAGNOSIS — R10.9 LEFT FLANK PAIN: ICD-10-CM

## 2019-10-24 DIAGNOSIS — E78.5 HYPERLIPIDEMIA, UNSPECIFIED HYPERLIPIDEMIA TYPE: ICD-10-CM

## 2019-10-24 DIAGNOSIS — G89.29 CHRONIC LEFT-SIDED LOW BACK PAIN WITHOUT SCIATICA: ICD-10-CM

## 2019-10-24 RX ORDER — AMLODIPINE BESYLATE 5 MG/1
5 TABLET ORAL DAILY
Qty: 90 TAB | Refills: 3 | Status: SHIPPED | OUTPATIENT
Start: 2019-10-24 | End: 2020-02-12 | Stop reason: SDUPTHER

## 2019-10-24 RX ORDER — BENAZEPRIL HYDROCHLORIDE 20 MG/1
20 TABLET ORAL DAILY
Qty: 30 TAB | Refills: 1 | Status: SHIPPED | OUTPATIENT
Start: 2019-10-24 | End: 2019-11-11 | Stop reason: SDUPTHER

## 2019-10-24 RX ORDER — AMLODIPINE BESYLATE 5 MG/1
5 TABLET ORAL DAILY
Qty: 30 TAB | Refills: 1 | Status: SHIPPED | OUTPATIENT
Start: 2019-10-24 | End: 2019-11-11 | Stop reason: SDUPTHER

## 2019-10-24 RX ORDER — ASCORBIC ACID 500 MG
1000 TABLET ORAL DAILY
COMMUNITY

## 2019-10-24 RX ORDER — BENAZEPRIL HYDROCHLORIDE 20 MG/1
20 TABLET ORAL DAILY
Qty: 90 TAB | Refills: 1 | Status: SHIPPED | OUTPATIENT
Start: 2019-10-24 | End: 2020-02-24 | Stop reason: SDUPTHER

## 2019-10-24 NOTE — PROGRESS NOTES
Hanny Mcallister is a 77 y.o. female here for evaluation:    Chief Complaint   Patient presents with    New Patient    Establish Care    Medication Evaluation     Notes:  Patient fasting at this time. Patient has been taking bp medications every other day due to running out of meds. [facility] bp med [facility] and emergency supply sent to local pharmacy on file.      Patient refused flu vaccine.      Patient is in PT for back pain. Had been seen with [facility]. Her PCP Dr. Dede Zimmerman retired. She was seeing NP's there until they merged. When they merged, she was unable to be seen there with [facility] IM merge. She notes happy with practice here--location is good and not planning on returning to practice above. She does not need atenolol or pravastatin refills at this time. Typically on 6mo schedule with PCP prior      Nursing screenings reviewed by provider at visit. Past Medical History:   Diagnosis Date    Arthritis     toe, back    Asthma     as a teenager    Breast cancer ([facility] [address]     Right Breast @ age 62 (2012)    Cancer [facility])      right breast cancer mastectomy    GERD (gastroesophageal reflux disease)     Hypertension     Osteoarthritis     S/P radiotherapy     Right Breast 2012    Thyroid disease     did take meds but now off        Prior to Admission medications    Medication Sig Start Date End Date Taking? Authorizing Provider   ascorbic acid, vitamin C, (VITAMIN C) 500 mg tablet Take 1,000 mg by mouth. Yes Provider, Historical   COD LIVER OIL PO Take  by mouth. Yes Provider, Historical   MASTECTOMY PROSTHESIS misc Bra and prosthesis / breast cancer hx mastectomy 7/15/19  Yes Julio Cesar Equilla Feeling, DO   triamcinolone (NASACORT AQ) 55 mcg nasal inhaler 2 Sprays daily.    Yes Provider, Historical   atenolol (TENORMIN) 50 mg tablet TAKE 1 TABLET DAILY 10/29/18  Yes Corrie Veras NP   pravastatin (PRAVACHOL) 80 mg tablet TAKE 1 TABLET NIGHTLY 10/29/18  Yes Martinez Norton NP   glucosamine-chondroitin (ARTHX) 500-400 mg cap Take 1 Cap by mouth daily. Yes Provider, Historical   naproxen sodium 220 mg cap Take 3 Tabs by mouth as needed. Yes Provider, Historical   benazepril (LOTENSIN) 20 mg tablet Take 1 Tab by mouth daily. 10/10/18  Yes Martinez Norton NP   amLODIPine (NORVASC) 5 mg tablet Take 1 Tab by mouth daily. 10/10/18  Yes Martinez Norton NP   cyanocobalamin 1,000 mcg tablet Take 1,000 mcg by mouth daily. Yes Provider, Historical   albuterol (PROVENTIL HFA, VENTOLIN HFA, PROAIR HFA) 90 mcg/actuation inhaler Take 2 Puffs by inhalation every four (4) hours as needed for Wheezing. Indications: BRONCHOSPASM PREVENTION 11/6/17  Yes Martinez oNrton NP   lansoprazole (PREVACID) 15 mg capsule Take 15 mg by mouth as needed. Yes Provider, Historical   cetirizine (ZYRTEC) 10 mg tablet Take  by mouth daily. Yes Provider, Historical   cholecalciferol, vitamin D3, 2,000 unit tab Take 1,000 Units by mouth. Yes Provider, Historical   aspirin (ASPIRIN) 325 mg tablet Take 1 Tab by mouth daily. 6/20/12  Yes Huyen Julien MD        ROS  Complete ROS negative except as indicated in note. Vitals:    10/24/19 0838   BP: 148/72   Pulse: 60   Resp: 14   Temp: 98.3 °F (36.8 °C)   TempSrc: Oral   SpO2: 100%   Weight: 154 lb 8 oz (70.1 kg)   Height: 5' 2\" (1.575 m)   PainSc:   0 - No pain      Body mass index is 28.26 kg/m². Physical Exam:     Physical Exam   Constitutional: She appears well-developed and well-nourished. No distress. HENT:   Head: Normocephalic and atraumatic. Eyes: Conjunctivae are normal. Right eye exhibits no discharge. Left eye exhibits no discharge. No scleral icterus. Neck: Normal range of motion. Neck supple. No tracheal deviation present. No thyromegaly present. Cardiovascular: Normal rate, regular rhythm, normal heart sounds and intact distal pulses. Exam reveals no gallop and no friction rub.    No murmur heard. Pulmonary/Chest: Effort normal and breath sounds normal. No stridor. No respiratory distress. She has no wheezes. She has no rales. She exhibits no tenderness. Abdominal: Soft. Bowel sounds are normal. She exhibits no distension. There is no tenderness. Musculoskeletal: She exhibits no edema or tenderness. Lymphadenopathy:     She has no cervical adenopathy. Neurological: She is alert. She exhibits normal muscle tone. Coordination normal.   Skin: Skin is warm. No rash noted. She is not diaphoretic. No erythema. No pallor. Psychiatric: She has a normal mood and affect. Her behavior is normal. Judgment and thought content normal.       Assessment and Plan:       ICD-10-CM ICD-9-CM    1. Essential hypertension V80 449.3 METABOLIC PANEL, COMPREHENSIVE   2. Prediabetes R73.03 790.29 CBC WITH AUTOMATED DIFF      METABOLIC PANEL, COMPREHENSIVE      HEMOGLOBIN A1C WITH EAG   3. Hyperlipidemia, unspecified hyperlipidemia type L87.8 917.7 METABOLIC PANEL, COMPREHENSIVE      LIPID PANEL      CK   4. Vitamin D deficiency E55.9 268.9 VITAMIN D, 25 HYDROXY   5. Borderline abnormal TFTs R94.6 794.5 TSH AND FREE T4   6. Left flank pain R10.9 789.09 US ABD COMP   7. Chronic left-sided low back pain without sciatica M54.5 724.2     G89.29 338.29        1-3: Fasting labs reviewed at visit.    4,5: Update prior evaluation with above labs. 6.7:  UA from Aug 2019 normal.  US scheduling reviewed. Follow-up and Dispositions    · Return in about 2 weeks (around 11/7/2019) for Blood Pressure follow-up--nurse visit; 3mo for BP follow-up, Medicare AWV. · Prefers Medicare AWV next visit. Last March 2018.        lab results and schedule of future lab studies reviewed with patient  reviewed medications and side effects in detail  radiology results and schedule of future radiology studies reviewed with patient    For additional documentation of information and/or recommendations discussed this visit, please see notes in instructions. Plan and evaluation (above) reviewed with pt at visit  Patient voiced understanding of plan and provided with time to ask/review questions. After Visit Summary (AVS) provided to pt after visit with additional instructions as needed/reviewed.         Future Appointments   Date Time Provider Michelle Morgan   7/15/2020  2:00 PM Elsa Romero DO [facility]

## 2019-10-24 NOTE — PROGRESS NOTES
RM 16    Patient fasting at this time. Patient has been taking bp medications every other day due to running out of meds. Request bp med refill be sent to [facility] [employer] and emergency supply sent to local pharmacy on file. Patient refused flu vaccine. Patient is in PT for back pain. Chief Complaint   Patient presents with    New Patient    Establish Care    Medication Evaluation     1. Have you been to the ER, urgent care clinic since your last visit? Hospitalized since your last visit? No    2. Have you seen or consulted any other health care providers outside of the [address] [place] [address] since your last visit? Include any pap smears or colon screening. No    Health Maintenance Due   Topic Date Due    Shingrix Vaccine Age 49> (1 of 2) 10/05/2001    COLONOSCOPY  06/03/2018    MEDICARE YEARLY EXAM  03/21/2019    Pneumococcal 65+ years (2 of 2 - PPSV23) 03/22/2019     Abuse Screening Questionnaire 10/24/2019   Do you ever feel afraid of your partner? N   Are you in a relationship with someone who physically or mentally threatens you? N   Is it safe for you to go home? Y     3 most recent PHQ Screens 10/24/2019   Little interest or pleasure in doing things Not at all   Feeling down, depressed, irritable, or hopeless Not at all   Total Score PHQ 2 0     Fall Risk Assessment, last 12 mths 10/24/2019   Able to walk? Yes   Fall in past 12 months?  No   Fall with injury? -   Number of falls in past 12 months -   Fall Risk Score -

## 2019-10-24 NOTE — PATIENT INSTRUCTIONS
If you need assistance with activating dianboom, or have other dianboom questions, you can call [phone].

## 2019-10-25 LAB
25(OH)D3+25(OH)D2 SERPL-MCNC: 36.3 NG/ML (ref 30–100)
ALBUMIN SERPL-MCNC: 4.3 G/DL (ref 3.6–4.8)
ALBUMIN/GLOB SERPL: 1.4 {RATIO} (ref 1.2–2.2)
ALP SERPL-CCNC: 74 IU/L (ref 39–117)
ALT SERPL-CCNC: 8 IU/L (ref 0–32)
AST SERPL-CCNC: 11 IU/L (ref 0–40)
BASOPHILS # BLD AUTO: 0 X10E3/UL (ref 0–0.2)
BASOPHILS NFR BLD AUTO: 1 %
BILIRUB SERPL-MCNC: 0.3 MG/DL (ref 0–1.2)
BUN SERPL-MCNC: 11 MG/DL (ref 8–27)
BUN/CREAT SERPL: 16 (ref 12–28)
CALCIUM SERPL-MCNC: 9.6 MG/DL (ref 8.7–10.3)
CHLORIDE SERPL-SCNC: 104 MMOL/L (ref 96–106)
CHOLEST SERPL-MCNC: 159 MG/DL (ref 100–199)
CK SERPL-CCNC: 63 U/L (ref 24–173)
CO2 SERPL-SCNC: 26 MMOL/L (ref 20–29)
CREAT SERPL-MCNC: 0.7 MG/DL (ref 0.57–1)
EOSINOPHIL # BLD AUTO: 0.3 X10E3/UL (ref 0–0.4)
EOSINOPHIL NFR BLD AUTO: 5 %
ERYTHROCYTE [DISTWIDTH] IN BLOOD BY AUTOMATED COUNT: 13.3 % (ref 12.3–15.4)
EST. AVERAGE GLUCOSE BLD GHB EST-MCNC: 131 MG/DL
GLOBULIN SER CALC-MCNC: 3.1 G/DL (ref 1.5–4.5)
GLUCOSE SERPL-MCNC: 97 MG/DL (ref 65–99)
HBA1C MFR BLD: 6.2 % (ref 4.8–5.6)
HCT VFR BLD AUTO: 39.6 % (ref 34–46.6)
HDLC SERPL-MCNC: 53 MG/DL
HGB BLD-MCNC: 12.3 G/DL (ref 11.1–15.9)
IMM GRANULOCYTES # BLD AUTO: 0 X10E3/UL (ref 0–0.1)
IMM GRANULOCYTES NFR BLD AUTO: 0 %
LDLC SERPL CALC-MCNC: 96 MG/DL (ref 0–99)
LYMPHOCYTES # BLD AUTO: 1.7 X10E3/UL (ref 0.7–3.1)
LYMPHOCYTES NFR BLD AUTO: 30 %
MCH RBC QN AUTO: 26.2 PG (ref 26.6–33)
MCHC RBC AUTO-ENTMCNC: 31.1 G/DL (ref 31.5–35.7)
MCV RBC AUTO: 84 FL (ref 79–97)
MONOCYTES # BLD AUTO: 0.5 X10E3/UL (ref 0.1–0.9)
MONOCYTES NFR BLD AUTO: 9 %
NEUTROPHILS # BLD AUTO: 3.2 X10E3/UL (ref 1.4–7)
NEUTROPHILS NFR BLD AUTO: 55 %
PLATELET # BLD AUTO: 302 X10E3/UL (ref 150–450)
POTASSIUM SERPL-SCNC: 4.2 MMOL/L (ref 3.5–5.2)
PROT SERPL-MCNC: 7.4 G/DL (ref 6–8.5)
RBC # BLD AUTO: 4.69 X10E6/UL (ref 3.77–5.28)
SODIUM SERPL-SCNC: 144 MMOL/L (ref 134–144)
T4 FREE SERPL-MCNC: 1.24 NG/DL (ref 0.82–1.77)
TRIGL SERPL-MCNC: 52 MG/DL (ref 0–149)
TSH SERPL DL<=0.005 MIU/L-ACNC: 0.48 UIU/ML (ref 0.45–4.5)
VLDLC SERPL CALC-MCNC: 10 MG/DL (ref 5–40)
WBC # BLD AUTO: 5.8 X10E3/UL (ref 3.4–10.8)

## 2019-11-07 DIAGNOSIS — I10 ESSENTIAL HYPERTENSION: ICD-10-CM

## 2019-11-10 RX ORDER — ATENOLOL 50 MG/1
TABLET ORAL
Qty: 90 TAB | Refills: 1 | Status: SHIPPED | OUTPATIENT
Start: 2019-11-10 | End: 2020-02-12 | Stop reason: SDUPTHER

## 2019-11-11 ENCOUNTER — OFFICE VISIT (OUTPATIENT)
Dept: INTERNAL MEDICINE CLINIC | Age: 69
End: 2019-11-11

## 2019-11-11 VITALS
BODY MASS INDEX: 28.89 KG/M2 | RESPIRATION RATE: 16 BRPM | HEIGHT: 62 IN | WEIGHT: 157 LBS | OXYGEN SATURATION: 100 % | SYSTOLIC BLOOD PRESSURE: 134 MMHG | TEMPERATURE: 98.8 F | DIASTOLIC BLOOD PRESSURE: 69 MMHG | HEART RATE: 71 BPM

## 2019-11-11 DIAGNOSIS — I10 ESSENTIAL HYPERTENSION: ICD-10-CM

## 2019-11-11 DIAGNOSIS — Z00.00 MEDICARE ANNUAL WELLNESS VISIT, SUBSEQUENT: Primary | ICD-10-CM

## 2019-11-11 DIAGNOSIS — R73.03 PREDIABETES: ICD-10-CM

## 2019-11-11 DIAGNOSIS — E78.5 HYPERLIPIDEMIA, UNSPECIFIED HYPERLIPIDEMIA TYPE: ICD-10-CM

## 2019-11-11 DIAGNOSIS — R10.9 LEFT FLANK PAIN: ICD-10-CM

## 2019-11-11 DIAGNOSIS — Z13.31 SCREENING FOR DEPRESSION: ICD-10-CM

## 2019-11-11 DIAGNOSIS — Z13.39 SCREENING FOR ALCOHOLISM: ICD-10-CM

## 2019-11-11 NOTE — PROGRESS NOTES
This is a Subsequent Medicare Annual Wellness Exam (AWV) (Performed 12 months after IPPE or effective date of Medicare Part B enrollment)    I have reviewed the patient's medical history in detail and updated the computerized patient record. History     Chief Complaint   Patient presents with    Annual Wellness Visit    Hypertension     Last AWV March 2018 with NP Dia Thorne (changed practices). Note reviewed. Rviewed schedule with pt. Prefers 6mo follow-up if possible. She would prefer this over 3mo or 4mo schedule for follow-up. Reviewed with good BP control and good A1c levels, plan for 6mo follow-up as below. She would come in regularly as needed if problems arose with PCP in past.      Notes has US scheduled for flank pain--this Friday--from last visit with me. Past Medical History:   Diagnosis Date    Arthritis     toe, back    Asthma     as a teenager    Breast cancer ([facility] [address]     Right Breast @ age 62 (2012)    Cancer [facility])      right breast cancer mastectomy    GERD (gastroesophageal reflux disease)     Hypertension     Osteoarthritis     S/P radiotherapy     Right Breast 2012    Thyroid disease     did take meds but now off      Past Surgical History:   Procedure Laterality Date    BREAST SURGERY PROCEDURE UNLISTED  6/14/12    RIGHT BREAST MASTECTOMY WITH RIGHT SENTINEL NODE BIOPSY, PORT A CATH INSERTION WITH ULTRASOUND; INFUSAPORT INSERTION; SENTINEL NODE BIOPSY.  CARDIAC SURG PROCEDURE UNLIST      attempted PTCA    CHEST SURGERY PROCEDURE UNLISTED Left 2012    Port Placement    HX BREAST BIOPSY  5/1/12    RIGHT breast biopsy - POSITIVE    HX BREAST BIOPSY  5/22/12    LEFT breast biopsy - BENIGN    HX MASTECTOMY Right     June 2012 w/ Chemo & Radiation    HX ORTHOPAEDIC      4th toe bilaterally    HX PARTIAL HYSTERECTOMY  1983    prolapsed uterus    HX VASCULAR ACCESS      portacath     Prior to Admission medications    Medication Sig Start Date End Date Taking? Authorizing Provider   atenolol (TENORMIN) 50 mg tablet TAKE 1 TABLET DAILY 11/10/19  Yes Jem Shine MD   ascorbic acid, vitamin C, (VITAMIN C) 500 mg tablet Take 1,000 mg by mouth daily. Yes Provider, Historical   benazepril (LOTENSIN) 20 mg tablet Take 1 Tab by mouth daily. 10/24/19  Yes Jem Shine MD   amLODIPine (NORVASC) 5 mg tablet Take 1 Tab by mouth daily. 10/24/19  Yes Jem Shine MD   COD LIVER OIL PO Take 1 Cap by mouth daily. Yes Provider, Historical   MASTECTOMY PROSTHESIS misc Bra and prosthesis / breast cancer hx mastectomy 7/15/19  Yes Julio Cesar Bruce DO   triamcinolone (NASACORT AQ) 55 mcg nasal inhaler 2 Sprays daily. Yes Provider, Historical   pravastatin (PRAVACHOL) 80 mg tablet TAKE 1 TABLET NIGHTLY 10/29/18  Yes Amanda Garrett NP   glucosamine-chondroitin (ARTHX) 500-400 mg cap Take 1 Cap by mouth daily. Yes Provider, Historical   naproxen sodium 220 mg cap Take 3 Tabs by mouth as needed. Yes Provider, Historical   cyanocobalamin 1,000 mcg tablet Take 1,000 mcg by mouth daily. Yes Provider, Historical   lansoprazole (PREVACID) 15 mg capsule Take 15 mg by mouth as needed. Yes Provider, Historical   cetirizine (ZYRTEC) 10 mg tablet Take 10 mg by mouth daily. Yes Provider, Historical   cholecalciferol, vitamin D3, 2,000 unit tab Take 1,000 Units by mouth. Yes Provider, Historical   aspirin (ASPIRIN) 325 mg tablet Take 1 Tab by mouth daily. 6/20/12  Yes Keven Pineda MD   albuterol (PROVENTIL HFA, VENTOLIN HFA, PROAIR HFA) 90 mcg/actuation inhaler Take 2 Puffs by inhalation every four (4) hours as needed for Wheezing.  Indications: BRONCHOSPASM PREVENTION 11/6/17   Amanda Garrett NP       Allergies   Allergen Reactions    Penicillins Hives    Bactrim [Sulfamethoprim Ds] Rash    Ciprofloxacin (Bulk) Rash     Family History   Problem Relation Age of Onset    Diabetes Mother     Cancer Father         prostate    Cancer Maternal Aunt         breast cancer; mastectomy, now age 76    Breast Cancer Maternal Aunt         42's    Breast Cancer Maternal Aunt         63's     Social History     Tobacco Use    Smoking status: Never Smoker    Smokeless tobacco: Never Used   Substance Use Topics    Alcohol use: No     Alcohol/week: 0.0 standard drinks     Patient Active Problem List   Diagnosis Code    Breast cancer, stage 2 ([facility] [address] C50.919    Lymphedema of arm I89.0    S/P mastectomy Z90.10    Neuropathy G62.9    Essential hypertension I10    History of hyperthyroidism Z86.39    Hypercholesteremia E78.00    CAD (coronary artery disease), native coronary artery I25.10    Degenerative arthritis of left knee M17.12    History of colonoscopy Z98.890    Pain in right axilla M79.621    Rib pain on right side R07.81    Sacroiliac joint pain M53.3    Left knee pain M25.562    Arthritis of knee, left M17.12    ACP (advance care planning) Z71.89    Chest wall discomfort R07.89    H/O mammogram Z92.89    Osteoarthritis of spine with radiculopathy, cervical region M47.22    Prediabetes R73.03    Vitreous degeneration H43.819    H/O mastectomy, right Z90.11    Arthritis M19.90    Neck pain M54.2    Vitamin D deficiency E55.9    History of breast cancer Z85.3       Depression Risk Factor Screening:     3 most recent PHQ Screens 10/24/2019   Little interest or pleasure in doing things Not at all   Feeling down, depressed, irritable, or hopeless Not at all   Total Score PHQ 2 0       Alcohol Risk Factor Screening:     Alcohol Risk Factor Screening:   Do you average 1 drink per night or more than 7 drinks a week:  No  On any one occasion in the past three months have you have had more than 3 drinks containing alcohol:  No    Functional Ability and Level of Safety:     Hearing Loss  Hearing is good.       Activities of Daily Living  The home contains: no safety equipment  See below:    ADL Assessment 11/11/2019   Feeding yourself No Help Needed   Getting from bed to chair No Help Needed   Getting dressed No Help Needed   Bathing or showering No Help Needed   Walk across the room (includes cane/walker) No Help Needed   Using the telphone No Help Needed   Taking your medications No Help Needed   Preparing meals No Help Needed   Managing money (expenses/bills) No Help Needed   Moderately strenuous housework (laundry) No Help Needed   Shopping for personal items (toiletries/medicines) No Help Needed   Shopping for groceries No Help Needed   Driving No Help Needed   Climbing a flight of stairs No Help Needed   Getting to places beyond walking distances No Help Needed       Fall Risk  Fall Risk Assessment, last 12 mths 10/24/2019   Able to walk? Yes   Fall in past 12 months? No   Fall with injury? -   Number of falls in past 12 months -   Fall Risk Score -       Abuse Screen  Patient is not abused    Abuse Screening Questionnaire 10/24/2019   Do you ever feel afraid of your partner? N   Are you in a relationship with someone who physically or mentally threatens you? N   Is it safe for you to go home? Y       Cognitive Screening   Evaluation of Cognitive Function:  Has your family/caregiver stated any concerns about your memory: no      Physical Exam[de-identified]     Blood pressure 134/69, pulse 71, temperature 98.8 °F (37.1 °C), temperature source Oral, resp. rate 16, height 5' 2\" (1.575 m), weight 157 lb (71.2 kg), SpO2 100 %. General appearance: alert, cooperative, no distress, appears stated age  Head: Normocephalic, without obvious abnormality, atraumatic  Eyes: conjunctivae/corneas clear. Ears: normal TM's and external ear canals AU  Nose: Nares normal. No drainage. Throat: Lips, mucosa, and tongue normal. Teeth and gums normal  Neck: supple, symmetrical, trachea midline, no adenopathy, thyroid: not enlarged, symmetric, no tenderness/mass/nodules  Back: symmetric, no curvature.  ROM normal  Lungs: clear to auscultation bilaterally  Chest wall: no tenderness  Heart: regular rate and rhythm, S1, S2 normal, no murmur, click, rub or gallop  Abdomen: soft, non-tender. Bowel sounds normal. No masses,  no organomegaly  Extremities: extremities normal, atraumatic, no cyanosis or edema  Pulses: 2+ and symmetric radial.  Skin: Skin color, texture, turgor normal. No rashes or lesions  Lymph nodes: Cervical nodes normal.  Neurologic: Grossly normal    Patient Care Team   Patient Care Team:  Minerva Toth NP as PCP - General (Nurse Practitioner)  Minerva Toth NP as PCP - [facility]  Dylon July, Arlene Williamson DO as Physician (Oncology)  Marv Thayer MD as Physician (Breast Surgery)  Barrera Siddiqui MD as Physician (Radiation Oncology)  Katty Rangel MD (Cardiology)  Kathie Sharp, [address] as Physician Assistant (Physician Assistant)  Mayuri Mckee MD (Breast Surgery)  Laurie Kauffman MD (Orthopedic Surgery)  Noel Conley MD (Ophthalmology)    Advice/Referrals/Counseling   Education and counseling provided:  Are appropriate based on today's review and evaluation  End-of-Life planning (with patient's consent)  Pneumococcal Vaccine  Screening Mammography  Colorectal cancer screening tests  Bone mass measurement (DEXA)    Health maintenance:   Last mammogram: 05/2017  Last DEXA:  09/2017  Last colonoscopy:  2013, s/p polypectomy.  Due 2018  Tdap: UTD  Influenza: UTD  Pneumonia: never had  Shingles: never had    Pt notes doing mammogram with oncology yearly in ~ July. Last done Aug 2019. Dexa normal prior--no repeat planned at this time. ACPlanning reviewed. Patient has no written Advanced Care Plan. Reviewed resources here if needed, and communication of plan once clarified/documented. Information provided to pt at visit: \"Your Right to Decide:  A guide to communicating your health care choices in [place]\". Plan PPSV-23 at next visit.   She had PCV-13 more than 1yr ago, and PPSV-23 prior more than 5yrs ago and prior to 66yr old. She notes no history of shingles. Plan VZV Ab with next labs at follow-up reviewed. She was exposed multiple times as child and teenager to Varicella, without history clinical infection. Had colonoscopy done 2018--release completed at visit. Assessment/Plan       ICD-10-CM ICD-9-CM    1. Medicare annual wellness visit, subsequent Z00.00 V70.0    2. Screening for alcoholism Z13.39 V79.1    3. Screening for depression Z13.31 V79.0 CANCELED: [address]   4. Essential hypertension I10 401.9    5. Prediabetes R73.03 790.29    6. Hyperlipidemia, unspecified hyperlipidemia type E78.5 272.4    7. Left flank pain R10.9 789.09        1,2,3:  Screenings reviewed at visit. Completed additional documentation today for wellness visit and mammogram coupons as requested. 4,5,6:  6mo monitoring planned at visit as reviewed. 7.  US scheduled as below. Follow-up and Dispositions    · Return in about 6 months (around 5/11/2020) for medication follow-up, fasting labs, Pneumonia vaccine. lab results and schedule of future lab studies reviewed with patient  reviewed medications and side effects in detail  radiology results and schedule of future radiology studies reviewed with patient    Plan and evaluation (above) reviewed with pt at visit  Patient voiced understanding of plan and provided with time to ask/review questions. After Visit Summary (AVS) provided to pt after visit with additional instructions as needed/reviewed.       Health Maintenance Due   Topic Date Due    Shingrix Vaccine Age 49> (1 of 2) 10/05/2001    COLONOSCOPY  06/03/2018    MEDICARE YEARLY EXAM  03/21/2019    Pneumococcal 65+ years (2 of 2 - PPSV23) 03/22/2019     Future Appointments   Date Time Provider Michelle Morgan   11/15/2019  7:45 AM [place]  [address] [facility]   7/15/2020  2:00 PM [address], [address] [place] [address]

## 2019-11-11 NOTE — PROGRESS NOTES
Identified pt with two pt identifiers(name and ). Reviewed record in preparation for visit and have obtained necessary documentation. Chief Complaint   Patient presents with    Annual Wellness Visit    Hypertension        Health Maintenance Due   Topic    Shingrix Vaccine Age 50> (1 of 2)    COLONOSCOPY     MEDICARE YEARLY EXAM     Pneumococcal 65+ years (2 of 2 - PPSV23)       Coordination of Care Questionnaire:  :   1) Have you been to an emergency room, urgent care, or hospitalized since your last visit? If yes, where when, and reason for visit? no       2. Have seen or consulted any other health care provider since your last visit? If yes, where when, and reason for visit? NO      Patient is accompanied by self I have received verbal consent from Maria Guadalupe Fermin to discuss any/all medical information while they are present in the room.

## 2019-11-11 NOTE — PATIENT INSTRUCTIONS
Medicare Wellness Visit, Female     The best way to live healthy is to have a lifestyle where you eat a well-balanced diet, exercise regularly, limit alcohol use, and quit all forms of tobacco/nicotine, if applicable. Regular preventive services are another way to keep healthy. Preventive services (vaccines, screening tests, monitoring & exams) can help personalize your care plan, which helps you manage your own care. Screening tests can find health problems at the earliest stages, when they are easiest to treat. [facility] follows the current, evidence-based guidelines published by the [facility] Keshawn Jamison ([facility]) when recommending preventive services for our patients. Because we follow these guidelines, sometimes recommendations change over time as research supports it. (For example, mammograms used to be recommended annually. Even though Medicare will still pay for an annual mammogram, the newer guidelines recommend a mammogram every two years for women of average risk). Of course, you and your doctor may decide to screen more often for some diseases, based on your risk and your co-morbidities (chronic disease you are already diagnosed with). Preventive services for you include:  - Medicare offers their members a free annual wellness visit, which is time for you and your primary care provider to discuss and plan for your preventive service needs. Take advantage of this benefit every year!  -All adults over the age of 72 should receive the recommended pneumonia vaccines. Current USPSTF guidelines recommend a series of two vaccines for the best pneumonia protection.   -All adults should have a flu vaccine yearly and a tetanus vaccine every 10 years.   -All adults age 48 and older should receive the shingles vaccines (series of two vaccines).       -All adults age 38-68 who are overweight should have a diabetes screening test once every three years.   -All adults born between [birth year] and 1965 should be screened once for Hepatitis C.  -Other screening tests and preventive services for persons with diabetes include: an eye exam to screen for diabetic retinopathy, a kidney function test, a foot exam, and stricter control over your cholesterol.   -Cardiovascular screening for adults with routine risk involves an electrocardiogram (ECG) at intervals determined by your doctor.   -Colorectal cancer screenings should be done for adults age 54-65 with no increased risk factors for colorectal cancer. There are a number of acceptable methods of screening for this type of cancer. Each test has its own benefits and drawbacks. Discuss with your doctor what is most appropriate for you during your annual wellness visit. The different tests include: colonoscopy (considered the best screening method), a fecal occult blood test, a fecal DNA test, and sigmoidoscopy.    -A bone mass density test is recommended when a woman turns 65 to screen for osteoporosis. This test is only recommended one time, as a screening. Some providers will use this same test as a disease monitoring tool if you already have osteoporosis. -Breast cancer screenings are recommended every other year for women of normal risk, age 54-69.  -Cervical cancer screenings for women over age 72 are only recommended with certain risk factors.      Here is a list of your current Health Maintenance items (your personalized list of preventive services) with a due date:  Health Maintenance Due   Topic Date Due    Shingles Vaccine (1 of 2) 10/05/2001    Colonoscopy  06/03/2018    Annual Well Visit  03/21/2019    Pneumococcal Vaccine (2 of 2 - PPSV23) 03/22/2019

## 2019-11-14 ENCOUNTER — DOCUMENTATION ONLY (OUTPATIENT)
Dept: INTERNAL MEDICINE CLINIC | Age: 69
End: 2019-11-14

## 2019-11-14 NOTE — PROGRESS NOTES
RYLEE signed 11/11/19, faxed 11/14/19 to Dr Harlan Ruelas / [facility], fax # [phone].  RYLEE and confirmation scanned into cc

## 2019-11-26 DIAGNOSIS — I25.10 CORONARY ARTERY DISEASE INVOLVING NATIVE CORONARY ARTERY OF NATIVE HEART WITHOUT ANGINA PECTORIS: ICD-10-CM

## 2019-11-26 NOTE — TELEPHONE ENCOUNTER
----- Message from Abby Gotti sent at 11/25/2019  4:25 PM EST -----  Regarding: Dr. Ry Iverson would like to see if she can get a prescription on her Pravastatin 80mg called to [address], [phone].  Contact is [phone]

## 2019-11-26 NOTE — TELEPHONE ENCOUNTER
Medication refill request:    Last Office Visit: 11/11/19  Next Office Visit:    Future Appointments   Date Time Provider Michelle Cathy   5/12/2020  9:30 AM Kelly Gonzalez MD [place] [address]   7/15/2020  2:00 PM [facility], Carney Aschoff, DO [place] verified.  yes

## 2019-12-02 RX ORDER — PRAVASTATIN SODIUM 80 MG/1
TABLET ORAL
Qty: 90 TAB | Refills: 1 | Status: SHIPPED | OUTPATIENT
Start: 2019-12-02 | End: 2020-02-12 | Stop reason: SDUPTHER

## 2019-12-13 ENCOUNTER — HOSPITAL ENCOUNTER (OUTPATIENT)
Dept: ULTRASOUND IMAGING | Age: 69
Discharge: HOME | End: 2019-12-13
Attending: INTERNAL MEDICINE
Payer: MEDICARE

## 2019-12-13 DIAGNOSIS — R10.9 LEFT FLANK PAIN: ICD-10-CM

## 2019-12-13 PROCEDURE — 76700 US EXAM ABDOM COMPLETE: CPT

## 2020-02-12 DIAGNOSIS — I10 ESSENTIAL HYPERTENSION: ICD-10-CM

## 2020-02-12 DIAGNOSIS — I25.10 CORONARY ARTERY DISEASE INVOLVING NATIVE CORONARY ARTERY OF NATIVE HEART WITHOUT ANGINA PECTORIS: ICD-10-CM

## 2020-02-12 NOTE — TELEPHONE ENCOUNTER
Patient is requesting that her prescription for Atenolol 50 mg be sent to [facility] which has been updated in the system.

## 2020-02-15 RX ORDER — PRAVASTATIN SODIUM 80 MG/1
TABLET ORAL
Qty: 90 TAB | Refills: 1 | Status: SHIPPED | OUTPATIENT
Start: 2020-02-15 | End: 2020-08-12

## 2020-02-15 RX ORDER — AMLODIPINE BESYLATE 5 MG/1
5 TABLET ORAL DAILY
Qty: 90 TAB | Refills: 3 | Status: SHIPPED | OUTPATIENT
Start: 2020-02-15 | End: 2021-02-09 | Stop reason: SDUPTHER

## 2020-02-15 RX ORDER — ATENOLOL 50 MG/1
TABLET ORAL
Qty: 90 TAB | Refills: 1 | Status: SHIPPED | OUTPATIENT
Start: 2020-02-15 | End: 2020-07-14

## 2020-02-15 NOTE — TELEPHONE ENCOUNTER
Refill request(s) approved--atenolol, amlodipine, pravastatin--to mail-order as requested. Refill protocol details (computer-generated) reviewed.

## 2020-02-28 ENCOUNTER — OFFICE VISIT (OUTPATIENT)
Dept: INTERNAL MEDICINE CLINIC | Age: 70
End: 2020-02-28

## 2020-02-28 VITALS
WEIGHT: 158.25 LBS | RESPIRATION RATE: 14 BRPM | DIASTOLIC BLOOD PRESSURE: 74 MMHG | SYSTOLIC BLOOD PRESSURE: 135 MMHG | TEMPERATURE: 98.1 F | BODY MASS INDEX: 29.12 KG/M2 | HEART RATE: 68 BPM | OXYGEN SATURATION: 99 % | HEIGHT: 62 IN

## 2020-02-28 DIAGNOSIS — R73.03 PREDIABETES: ICD-10-CM

## 2020-02-28 DIAGNOSIS — R94.6 BORDERLINE ABNORMAL TFTS: ICD-10-CM

## 2020-02-28 DIAGNOSIS — R10.9 LEFT FLANK PAIN: ICD-10-CM

## 2020-02-28 DIAGNOSIS — I10 ESSENTIAL HYPERTENSION: Primary | ICD-10-CM

## 2020-02-28 DIAGNOSIS — I25.10 CORONARY ARTERY DISEASE INVOLVING NATIVE CORONARY ARTERY OF NATIVE HEART WITHOUT ANGINA PECTORIS: ICD-10-CM

## 2020-02-28 DIAGNOSIS — Z01.84 IMMUNITY STATUS TESTING: ICD-10-CM

## 2020-02-28 DIAGNOSIS — L65.9 HAIR LOSS: ICD-10-CM

## 2020-02-28 DIAGNOSIS — E55.9 VITAMIN D DEFICIENCY: ICD-10-CM

## 2020-02-28 DIAGNOSIS — K76.0 HEPATIC STEATOSIS: ICD-10-CM

## 2020-02-28 DIAGNOSIS — R51.9 SCALP TENDERNESS: ICD-10-CM

## 2020-02-28 NOTE — PROGRESS NOTES
RM 16    Patient started taking Benazepril 2 days ago. Chief Complaint   Patient presents with    Ultrasound     review results. 1. Have you been to the ER, urgent care clinic since your last visit? Hospitalized since your last visit? No    2. Have you seen or consulted any other health care providers outside of the [address] [place] [address] since your last visit? Include any pap smears or colon screening. No    Health Maintenance Due   Topic Date Due    Shingrix Vaccine Age 49> (1 of 2) 10/05/2001    Colonoscopy  06/03/2018    Pneumococcal 65+ years (2 of 2 - PPSV23) 03/22/2019    GLAUCOMA SCREENING Q2Y  01/30/2020       Abuse Screening Questionnaire 2/28/2020   Do you ever feel afraid of your partner? N   Are you in a relationship with someone who physically or mentally threatens you? N   Is it safe for you to go home?  Y       3 most recent PHQ Screens 2/28/2020   Little interest or pleasure in doing things Not at all   Feeling down, depressed, irritable, or hopeless Not at all   Total Score PHQ 2 0       Learning Assessment 2/28/2020   PRIMARY LEARNER Patient   HIGHEST LEVEL OF EDUCATION - PRIMARY LEARNER  -   BARRIERS PRIMARY LEARNER [address] CAREGIVER -   PRIMARY LANGUAGE ENGLISH    NEED -   LEARNER PREFERENCE PRIMARY LISTENING     -     -     -     -   LEARNING SPECIAL TOPICS -   ANSWERED BY patient   RELATIONSHIP SELF

## 2020-02-28 NOTE — PATIENT INSTRUCTIONS
1.  Please follow the following instructions to process/authorize your referral, if needed:    Referrals processing  Please verify with your insurance IF you need referral authorization submitted. For insurance plans which require this, please follow the following steps. FAILURE TO DO SO MAY RESULT IN INABILITY TO SEE THE SPECIALIST YOU HAVE BEEN REFERRED TO (once you are scheduled to see them). 1. Call and schedule appointment with specialist  2. Call our clinic and leave message with provider name, and date of appointment  3. We will then submit the referral to your insurance. This process takes 2-5 business days. If you have questions about scheduling or authorizing referral, you can review with our referral coordinator Lidia Rinne). You can review with her today if available/if you have time, or you can call to review once you have made your referral/appointment. If you are not sure if you need referral authorizations, please review with the referral coordinator(s), either prior to or after you have made the appointment, as reviewed. 2.  If you need help finding a dermatologist covered by your insurance    --You can call [facility], [facility], [address] [facility] or [facility], for dermatology evaluation      --You can also contact your insurance to see which providers are covered prior to calling for an appointment. The dermatologist(s) (in the group with [facility]) will only see patients with skin cancer.

## 2020-02-28 NOTE — PROGRESS NOTES
History of Present Illness:   Sergio Pike is a 77 y.o. female here for evaluation:    Chief Complaint   Patient presents with    Ultrasound     review results. Notes (nursing/rooming note copied below in italics):  As above. Here for follow-up and fasting labs. She had last labs Oct 2019, prior to visit Nov 2019. Fasting labs reviewed and she prefers to do here prior to follow-up visit. Reviewed US and questions. Only had mild hepatic steatosis. Done for flank pain--reviewed no clear cause by US. She notes just wanted to make sure no abdominal problems with left leg/side pain. Therapy is helping with this. She still has problems lying flat, and has to sleep elevated, or pain will return. US report copied below--reviewed in detail with pt. She had reviewed in [facility] already. FINDINGS:     LIVER: The liver is echogenic relative to the right kidney, consistent with mild  hepatic steatosis. The deepest portions of the liver are not well penetrated as  a result. No focal mass or intrahepatic biliary ductal dilation is shown in the  superficial portions of the liver. MAIN PORTAL VEIN: Patent. Appropriate hepatopetal flow. GALLBLADDER: No dilation, wall thickening, or pericholecystic fluid. There are  small polyps; the largest measures 5 mm. COMMON DUCT: 0.5 cm in diameter. The duct is normal caliber. PANCREAS: The visualized portions of the pancreas are normal.   SPLEEN: Poorly visualized due to its high location in the abdomen. Normal size.      RIGHT KIDNEY: 9.8 cm in length. No hydronephrosis, shadowing calculus, or  contour-deforming renal mass. LEFT KIDNEY: 10.3 cm in length. No hydronephrosis, shadowing calculus, or  contour-deforming renal mass. AORTA: Normal caliber in its visualized portions. INFERIOR VENA CAVA: Normal caliber in its visualized portions.     IMPRESSION:   Mild hepatic steatosis.       Reviewed could see hepatology, but with mild findings, limited things they would do to manage. Reviewed optimizing diet, A1c and lipids as she is doing to manage steatosis. She is not interested in further evaluation at this time. She notes planning to see dermatology, and not sure if needs referral.    Reviewed referral if needed, and info as per instructions. Nursing screenings reviewed by provider at visit. Prior to Admission medications    Medication Sig Start Date End Date Taking? Authorizing Provider   benazepriL (LOTENSIN) 20 mg tablet Take 1 Tab by mouth daily. 2/25/20  Yes Tita Christianson MD   benazepriL (LOTENSIN) 20 mg tablet Take 1 Tab by mouth daily. 2/25/20  Yes Tita Christianson MD   atenoloL (TENORMIN) 50 mg tablet TAKE 1 TABLET DAILY 2/15/20  Yes Tita Christianson MD   pravastatin (PRAVACHOL) 80 mg tablet TAKE 1 TABLET NIGHTLY 2/15/20  Yes Tita Christianson MD   amLODIPine (NORVASC) 5 mg tablet Take 1 Tab by mouth daily. 2/15/20  Yes Tita Christianson MD   ascorbic acid, vitamin C, (VITAMIN C) 500 mg tablet Take 1,000 mg by mouth daily. Yes Provider, Historical   COD LIVER OIL PO Take 1 Cap by mouth daily. Yes Provider, Historical   MASTECTOMY PROSTHESIS misc Bra and prosthesis / breast cancer hx mastectomy 7/15/19  Yes Julio Cesar Dover, DO   triamcinolone (NASACORT AQ) 55 mcg nasal inhaler 2 Sprays daily. Yes Provider, Historical   glucosamine-chondroitin (ARTHX) 500-400 mg cap Take 1 Cap by mouth daily. Yes Provider, Historical   naproxen sodium 220 mg cap Take 3 Tabs by mouth as needed. Yes Provider, Historical   cyanocobalamin 1,000 mcg tablet Take 1,000 mcg by mouth daily. Yes Provider, Historical   albuterol (PROVENTIL HFA, VENTOLIN HFA, PROAIR HFA) 90 mcg/actuation inhaler Take 2 Puffs by inhalation every four (4) hours as needed for Wheezing.  Indications: BRONCHOSPASM PREVENTION 11/6/17  Yes Hudson Engle NP   lansoprazole (PREVACID) 15 mg capsule Take 15 mg by mouth as needed. Yes Provider, Historical   cetirizine (ZYRTEC) 10 mg tablet Take 10 mg by mouth daily. Yes Provider, Historical   cholecalciferol, vitamin D3, 2,000 unit tab Take 1,000 Units by mouth. Yes Provider, Historical   aspirin (ASPIRIN) 325 mg tablet Take 1 Tab by mouth daily. 6/20/12  Yes MD RETA Roque    Vitals:    02/28/20 1342 02/28/20 1419   BP: 147/66 135/74   Pulse: 68    Resp: 14    Temp: 98.1 °F (36.7 °C)    TempSrc: Oral    SpO2: 99%    Weight: 158 lb 4 oz (71.8 kg)    Height: 5' 2\" (1.575 m)    PainSc:   0 - No pain       Body mass index is 28.94 kg/m². Physical Exam:     Physical Exam  Vitals signs and nursing note reviewed. Constitutional:       General: She is not in acute distress. Appearance: Normal appearance. She is well-developed. She is not diaphoretic. HENT:      Head: Normocephalic and atraumatic. Mouth/Throat:      Mouth: Mucous membranes are moist.   Eyes:      General: No scleral icterus. Right eye: No discharge. Left eye: No discharge. Conjunctiva/sclera: Conjunctivae normal.   Cardiovascular:      Rate and Rhythm: Normal rate and regular rhythm. Pulses: Normal pulses. Heart sounds: Normal heart sounds. No murmur. No friction rub. No gallop. Pulmonary:      Effort: Pulmonary effort is normal. No respiratory distress. Breath sounds: Normal breath sounds. No stridor. No wheezing or rhonchi. Abdominal:      General: Bowel sounds are normal.      Palpations: Abdomen is soft. Tenderness: There is no abdominal tenderness. Musculoskeletal:         General: No deformity or signs of injury. Skin:     General: Skin is warm. Coloration: Skin is not jaundiced or pale. Findings: No bruising, erythema or rash. Neurological:      General: No focal deficit present. Mental Status: She is alert. Motor: No abnormal muscle tone.       Coordination: Coordination normal.      Gait: Gait normal. Psychiatric:         Mood and Affect: Mood normal.         Behavior: Behavior normal.         Thought Content: Thought content normal.         Judgment: Judgment normal.       Assessment and Plan:       ICD-10-CM ICD-9-CM    1. Essential hypertension I10 401.9 CBC WITH AUTOMATED DIFF      METABOLIC PANEL, COMPREHENSIVE      CK      LIPID PANEL   2. Vitamin D deficiency E55.9 268.9 VITAMIN D, 25 HYDROXY   3. Coronary artery disease involving native coronary artery of native heart without angina pectoris I25.10 414.01 CBC WITH AUTOMATED DIFF      METABOLIC PANEL, COMPREHENSIVE      CK      LIPID PANEL   4. Prediabetes R73.03 790.29 CBC WITH AUTOMATED DIFF      METABOLIC PANEL, COMPREHENSIVE      CK      LIPID PANEL      HEMOGLOBIN A1C WITH EAG   5. Left flank pain R10.9 789.09    6. Borderline abnormal TFTs R94.6 794.5 TSH AND FREE T4   7. Hepatic steatosis--mild by US Dec 2019. K76.0 571.8    8. Hair loss L65.9 704.00    9. Scalp tenderness R51 784.0    10. Immunity status testing Z01.84 V72.61 VZV AB, IGG       1. Monitoring and follow-up reviewed. 1-4,6,10:  Fasting labs ordered to do prior to follow-up as scheduled below. VZV IgG planned at last visit--ordered prior to closing today's note for future lab here.    5,7:  US reviewed as above. Flank pain likely MSK as noted above.    8,9:  She notes planning to schedule dermatology eval.  Referral(s) and referral coordination reviewed with patient at visit. Follow-up and Dispositions    · Return for [as scheduled--fasting labs 1-2 weeks prior].   · Planned pneumonia vaccine (PPSV-23) at follow-up--reviewed at last visit.       lab results and schedule of future lab studies reviewed with patient  reviewed medications and side effects in detail  radiology results and schedule of future radiology studies reviewed with patient    For additional documentation of information and/or recommendations discussed this visit, please see notes in instructions. Plan and evaluation (above) reviewed with pt at visit  Patient voiced understanding of plan and provided with time to ask/review questions. After Visit Summary (AVS) provided to pt after visit with additional instructions as needed/reviewed.         Future Appointments   Date Time Provider Michelle Morgan   5/12/2020  9:30 AM Timmy Colon MD [address] [facility]   7/15/2020  2:00 PM [facility] [place] [id number]

## 2020-03-01 RX ORDER — BENAZEPRIL HYDROCHLORIDE 20 MG/1
TABLET ORAL
Qty: 90 TAB | OUTPATIENT
Start: 2020-03-01

## 2020-04-02 DIAGNOSIS — R06.02 SOB (SHORTNESS OF BREATH): ICD-10-CM

## 2020-04-04 RX ORDER — BENAZEPRIL HYDROCHLORIDE 20 MG/1
TABLET ORAL
Qty: 30 TAB | Refills: 2 | Status: SHIPPED | OUTPATIENT
Start: 2020-04-04 | End: 2020-08-07 | Stop reason: SDUPTHER

## 2020-04-04 RX ORDER — ALBUTEROL SULFATE 90 UG/1
2 AEROSOL, METERED RESPIRATORY (INHALATION)
Qty: 1 INHALER | Refills: 3 | Status: SHIPPED | OUTPATIENT
Start: 2020-04-04

## 2020-04-04 NOTE — TELEPHONE ENCOUNTER
Refill request(s) approved--albuterol HFA--last more than one year ago here. Refill protocol details (computer-generated) reviewed.

## 2020-07-06 ENCOUNTER — OFFICE VISIT (OUTPATIENT)
Dept: INTERNAL MEDICINE CLINIC | Age: 70
End: 2020-07-06

## 2020-07-06 VITALS
SYSTOLIC BLOOD PRESSURE: 156 MMHG | TEMPERATURE: 98.8 F | HEIGHT: 62 IN | WEIGHT: 155.13 LBS | BODY MASS INDEX: 28.55 KG/M2 | DIASTOLIC BLOOD PRESSURE: 78 MMHG | HEART RATE: 60 BPM | OXYGEN SATURATION: 98 %

## 2020-07-06 DIAGNOSIS — R94.6 BORDERLINE ABNORMAL TFTS: ICD-10-CM

## 2020-07-06 DIAGNOSIS — R73.03 PREDIABETES: ICD-10-CM

## 2020-07-06 DIAGNOSIS — Z01.84 IMMUNITY STATUS TESTING: ICD-10-CM

## 2020-07-06 DIAGNOSIS — Z23 ENCOUNTER FOR IMMUNIZATION: ICD-10-CM

## 2020-07-06 DIAGNOSIS — I25.10 CORONARY ARTERY DISEASE INVOLVING NATIVE CORONARY ARTERY OF NATIVE HEART WITHOUT ANGINA PECTORIS: ICD-10-CM

## 2020-07-06 DIAGNOSIS — I10 ESSENTIAL HYPERTENSION: Primary | ICD-10-CM

## 2020-07-06 NOTE — PROGRESS NOTES
History of Present Illness:   Elmer Tony is a 77 y.o. female here for evaluation:    Chief Complaint   Patient presents with    Medication Evaluation     follow up    Labs    Immunization/Injection     pneumonia vaccine? Notes (nursing/rooming note copied below in italics):  None. LOV here Feb 2020. Notes had missed eye exam for Feb 2020. She has had colonoscopy in past.  Last in 2018. She had to have done at [facility]. She notes told 5yr interval.  Had in 2013 in 2018. She has cardiology follow-up with Dr. Stephanie Davis. She has not seen regularly or recently. Reviewed routine follow-up. She is not having any symptoms at this time. Nursing screenings reviewed by provider at visit. L       Prior to Admission medications    Medication Sig Start Date End Date Taking? Authorizing Provider   benazepriL (LOTENSIN) 20 mg tablet TAKE 1 TABLET BY MOUTH DAILY 4/4/20  Yes Arlette Linn MD   albuterol (PROVENTIL HFA, VENTOLIN HFA, PROAIR HFA) 90 mcg/actuation inhaler Take 2 Puffs by inhalation every four (4) hours as needed for Wheezing. Indications: bronchospasm prevention 4/4/20  Yes Arlette Linn MD   benazepriL (LOTENSIN) 20 mg tablet Take 1 Tab by mouth daily. 2/25/20  Yes Arlette Linn MD   atenoloL (TENORMIN) 50 mg tablet TAKE 1 TABLET DAILY 2/15/20  Yes Arlette Linn MD   pravastatin (PRAVACHOL) 80 mg tablet TAKE 1 TABLET NIGHTLY 2/15/20  Yes Arlette Linn MD   amLODIPine (NORVASC) 5 mg tablet Take 1 Tab by mouth daily. 2/15/20  Yes Arlette Linn MD   ascorbic acid, vitamin C, (VITAMIN C) 500 mg tablet Take 1,000 mg by mouth daily. Yes Provider, Historical   COD LIVER OIL PO Take 1 Cap by mouth daily. Yes Provider, Historical   MASTECTOMY PROSTHESIS misc Bra and prosthesis / breast cancer hx mastectomy 7/15/19  Yes Julio Cesar He, DO   triamcinolone (NASACORT AQ) 55 mcg nasal inhaler 2 Sprays daily.    Yes Provider, Historical   glucosamine-chondroitin (ARTHX) 500-400 mg cap Take 1 Cap by mouth daily. Yes Provider, Historical   naproxen sodium 220 mg cap Take 3 Tabs by mouth as needed. Yes Provider, Historical   cyanocobalamin 1,000 mcg tablet Take 2,000 mcg by mouth daily. Yes Provider, Historical   lansoprazole (PREVACID) 15 mg capsule Take 15 mg by mouth as needed. Yes Provider, Historical   cetirizine (ZYRTEC) 10 mg tablet Take 10 mg by mouth daily. Yes Provider, Historical   cholecalciferol, vitamin D3, 2,000 unit tab Take 2,000 Units by mouth. Yes Provider, Historical   aspirin (ASPIRIN) 325 mg tablet Take 1 Tab by mouth daily. 6/20/12  Yes Dustin Akins MD        ROS    Vitals:    07/06/20 0936   BP: 156/78   Pulse: 60   Temp: 98.8 °F (37.1 °C)   TempSrc: Oral   SpO2: 98%   Weight: 155 lb 2 oz (70.4 kg)   Height: 5' 2\" (1.575 m)   PainSc:   0 - No pain      Body mass index is 28.37 kg/m². Physical Exam:     Physical Exam  Vitals signs and nursing note reviewed. Constitutional:       General: She is not in acute distress. Appearance: Normal appearance. She is well-developed. She is not diaphoretic. HENT:      Head: Normocephalic and atraumatic. Mouth/Throat:      Mouth: Mucous membranes are moist.   Eyes:      General: No scleral icterus. Right eye: No discharge. Left eye: No discharge. Conjunctiva/sclera: Conjunctivae normal.   Neck:      Musculoskeletal: Neck supple. Cardiovascular:      Rate and Rhythm: Normal rate and regular rhythm. Pulses: Normal pulses. Heart sounds: Normal heart sounds. No murmur. No friction rub. No gallop. Pulmonary:      Effort: Pulmonary effort is normal. No respiratory distress. Breath sounds: Normal breath sounds. No stridor. No wheezing or rhonchi. Abdominal:      General: Bowel sounds are normal.      Palpations: Abdomen is soft. Tenderness: There is no abdominal tenderness.    Musculoskeletal:         General: No deformity or signs of injury. Lymphadenopathy:      Cervical: No cervical adenopathy. Skin:     General: Skin is warm. Coloration: Skin is not jaundiced or pale. Findings: No bruising, erythema or rash. Neurological:      General: No focal deficit present. Mental Status: She is alert. Motor: No abnormal muscle tone. Coordination: Coordination normal.      Gait: Gait normal.   Psychiatric:         Mood and Affect: Mood normal.         Behavior: Behavior normal.         Thought Content: Thought content normal.         Judgment: Judgment normal.         Assessment and Plan:       ICD-10-CM ICD-9-CM    1. Essential hypertension I10 401.9 CBC WITH AUTOMATED DIFF      METABOLIC PANEL, COMPREHENSIVE      LIPID PANEL      CK      REFERRAL TO CARDIOLOGY      OTHER   2. Coronary artery disease involving native coronary artery of native heart without angina pectoris I25.10 414.01 CBC WITH AUTOMATED DIFF      LIPID PANEL      CK      REFERRAL TO CARDIOLOGY      OTHER   3. Prediabetes R73.03 790.29 CBC WITH AUTOMATED DIFF      METABOLIC PANEL, COMPREHENSIVE      LIPID PANEL      HEMOGLOBIN A1C WITH EAG   4. Borderline abnormal TFTs R94.6 794.5    5. Immunity status testing Z01.84 V72.61 VZV AB, IGG   6. Encounter for immunization Z23 V03.89 PNEUMOCOCCAL POLYSACCHARIDE VACCINE, 23-VALENT, ADULT OR IMMUNOSUPPRESSED PT DOSE,      ADMIN PNEUMOCOCCAL VACCINE       1-6:  Fasting labs at [facility] reviewed. Not able to do routine labs in clinic at this time. 5.  Testing reviewed. She has not had shingles vaccines due to not having clinical history of Varicella. 6.  Updating vaccine reviewed at visit. She is 5yrs from last PPSV-23, which was done prior to 80yr old, and more than one year from [address] Follow-up and Dispositions    · Return in about 3 months (around 10/6/2020) for Blood Pressure follow-up, referral follow-up;  BP check (nurse visit) 1-2 weeks).        lab results and schedule of future lab studies reviewed with patient  reviewed medications and side effects in detail    For additional documentation of information and/or recommendations discussed this visit, please see notes in instructions. Plan and evaluation (above) reviewed with pt at visit  Patient voiced understanding of plan and provided with time to ask/review questions. After Visit Summary (AVS) provided to pt after visit with additional instructions as needed/reviewed. Future Appointments   Date Time Provider Michelle Morgan   7/15/2020  2:00 PM DO Cheyenne Cardenas [id number]       Release(s) completed at visit for:  Colonoscopy--done with Dr. Susanna Lamar prior in 2013. She notes in 2018 done at [facility] since couldn't do with endoscopy location (out of hospital). She is not sure if Dr. Susanna Lamar did procedure, but requested from his group as reviewed.

## 2020-07-06 NOTE — PATIENT INSTRUCTIONS
To make sure your home cuff is accurate:     Check your BP and heart rate. Compare these values with a validated monitor (grocery store, pharmacy, manual cuff with medical provider/clinic), to be sure the home cuff readings are accurate. To be accurate, recommend that the SBP's (systolic BP's or top numbers) be within 10 points of each other, and the DBP's (diastolic BP's or bottom numbers) to be within 5 points of each other. If your home meter is accurate, keep a log as reviewed, and will review here at your BP follow-up visit. If you note values >135/85 (either number) consistently, please return sooner to review.

## 2020-07-06 NOTE — PROGRESS NOTES
RM 14    Chief Complaint   Patient presents with    Medication Evaluation     follow up    Labs    Immunization/Injection     pneumonia vaccine      1. Have you been to the ER, urgent care clinic since your last visit? Hospitalized since your last visit? No    2. Have you seen or consulted any other health care providers outside of the [address] [place] [address] since your last visit? Include any pap smears or colon screening. No    Health Maintenance Due   Topic Date Due    Shingrix Vaccine Age 49> (1 of 2) 10/05/2001    Colonoscopy  06/03/2018    Pneumococcal 65+ years (2 of 2 - PPSV23) 03/22/2019    GLAUCOMA SCREENING Q2Y  01/30/2020     Abuse Screening Questionnaire 2/28/2020   Do you ever feel afraid of your partner? N   Are you in a relationship with someone who physically or mentally threatens you? N   Is it safe for you to go home? Y     3 most recent PHQ Screens 7/6/2020   Little interest or pleasure in doing things Not at all   Feeling down, depressed, irritable, or hopeless Not at all   Total Score PHQ 2 0     Immunization administered to left deltoid 7/6/2020 by Coleman Parker LPN with patient's consent. Patient tolerated procedure well. No reactions noted. VIS provided.

## 2020-07-14 ENCOUNTER — TELEPHONE (OUTPATIENT)
Dept: ONCOLOGY | Age: 70
End: 2020-07-14

## 2020-07-14 DIAGNOSIS — I10 ESSENTIAL HYPERTENSION: ICD-10-CM

## 2020-07-14 RX ORDER — ATENOLOL 50 MG/1
TABLET ORAL
Qty: 90 TAB | Refills: 1 | Status: SHIPPED | OUTPATIENT
Start: 2020-07-14 | End: 2020-11-19 | Stop reason: SDUPTHER

## 2020-07-14 NOTE — TELEPHONE ENCOUNTER
Refill request(s) approved--atenolol. Refill protocol details (computer-generated) reviewed, as available.

## 2020-07-14 NOTE — TELEPHONE ENCOUNTER
Called patient to offer vv or push out appt 2-3 months due to COVID concerns. No answer at home number, lvm.   Reached patient on mobile, and appointment was rescheduled for 10/5/2020 @ 9am.  Patient cannot do vv

## 2020-07-16 LAB
ALBUMIN SERPL-MCNC: 3.9 G/DL (ref 3.8–4.8)
ALBUMIN/GLOB SERPL: 1.3 {RATIO} (ref 1.2–2.2)
ALP SERPL-CCNC: 77 IU/L (ref 39–117)
ALT SERPL-CCNC: 13 IU/L (ref 0–32)
AST SERPL-CCNC: 13 IU/L (ref 0–40)
BASOPHILS # BLD AUTO: 0 X10E3/UL (ref 0–0.2)
BASOPHILS NFR BLD AUTO: 1 %
BILIRUB SERPL-MCNC: 0.3 MG/DL (ref 0–1.2)
BUN SERPL-MCNC: 11 MG/DL (ref 8–27)
BUN/CREAT SERPL: 15 (ref 12–28)
CALCIUM SERPL-MCNC: 9.1 MG/DL (ref 8.7–10.3)
CHLORIDE SERPL-SCNC: 104 MMOL/L (ref 96–106)
CHOLEST SERPL-MCNC: 159 MG/DL (ref 100–199)
CK SERPL-CCNC: 60 U/L (ref 32–182)
CO2 SERPL-SCNC: 24 MMOL/L (ref 20–29)
CREAT SERPL-MCNC: 0.72 MG/DL (ref 0.57–1)
EOSINOPHIL # BLD AUTO: 0.2 X10E3/UL (ref 0–0.4)
EOSINOPHIL NFR BLD AUTO: 3 %
ERYTHROCYTE [DISTWIDTH] IN BLOOD BY AUTOMATED COUNT: 13.6 % (ref 11.7–15.4)
EST. AVERAGE GLUCOSE BLD GHB EST-MCNC: 128 MG/DL
GLOBULIN SER CALC-MCNC: 2.9 G/DL (ref 1.5–4.5)
GLUCOSE SERPL-MCNC: 90 MG/DL (ref 65–99)
HBA1C MFR BLD: 6.1 % (ref 4.8–5.6)
HCT VFR BLD AUTO: 38.4 % (ref 34–46.6)
HDLC SERPL-MCNC: 56 MG/DL
HGB BLD-MCNC: 12 G/DL (ref 11.1–15.9)
IMM GRANULOCYTES # BLD AUTO: 0 X10E3/UL (ref 0–0.1)
IMM GRANULOCYTES NFR BLD AUTO: 0 %
LDLC SERPL CALC-MCNC: 94 MG/DL (ref 0–99)
LYMPHOCYTES # BLD AUTO: 2.5 X10E3/UL (ref 0.7–3.1)
LYMPHOCYTES NFR BLD AUTO: 40 %
MCH RBC QN AUTO: 26.7 PG (ref 26.6–33)
MCHC RBC AUTO-ENTMCNC: 31.3 G/DL (ref 31.5–35.7)
MCV RBC AUTO: 85 FL (ref 79–97)
MONOCYTES # BLD AUTO: 0.5 X10E3/UL (ref 0.1–0.9)
MONOCYTES NFR BLD AUTO: 9 %
NEUTROPHILS # BLD AUTO: 3 X10E3/UL (ref 1.4–7)
NEUTROPHILS NFR BLD AUTO: 47 %
PLATELET # BLD AUTO: 257 X10E3/UL (ref 150–450)
POTASSIUM SERPL-SCNC: 4.3 MMOL/L (ref 3.5–5.2)
PROT SERPL-MCNC: 6.8 G/DL (ref 6–8.5)
RBC # BLD AUTO: 4.5 X10E6/UL (ref 3.77–5.28)
SODIUM SERPL-SCNC: 142 MMOL/L (ref 134–144)
TRIGL SERPL-MCNC: 47 MG/DL (ref 0–149)
VLDLC SERPL CALC-MCNC: 9 MG/DL (ref 5–40)
VZV IGG SER IA-ACNC: 1789 INDEX
WBC # BLD AUTO: 6.3 X10E3/UL (ref 3.4–10.8)

## 2020-08-04 DIAGNOSIS — I10 ESSENTIAL HYPERTENSION: ICD-10-CM

## 2020-08-07 RX ORDER — BENAZEPRIL HYDROCHLORIDE 20 MG/1
TABLET ORAL
Qty: 90 TAB | Refills: 3 | Status: SHIPPED | OUTPATIENT
Start: 2020-08-07 | End: 2021-02-11 | Stop reason: SDUPTHER

## 2020-08-07 NOTE — TELEPHONE ENCOUNTER
Refill request(s) approved--benazepril. Refill protocol details (computer-generated) reviewed, as available.     Requested Prescriptions     Signed Prescriptions Disp Refills    benazepriL (LOTENSIN) 20 mg tablet 90 Tab 3     Sig: TAKE 1 TABLET BY MOUTH  DAILY     Authorizing Provider: Kendrick Engle

## 2020-08-10 DIAGNOSIS — I25.10 CORONARY ARTERY DISEASE INVOLVING NATIVE CORONARY ARTERY OF NATIVE HEART WITHOUT ANGINA PECTORIS: ICD-10-CM

## 2020-08-12 ENCOUNTER — HOSPITAL ENCOUNTER (OUTPATIENT)
Dept: MAMMOGRAPHY | Age: 70
Discharge: HOME | End: 2020-08-12
Attending: INTERNAL MEDICINE
Payer: MEDICARE

## 2020-08-12 DIAGNOSIS — Z12.31 VISIT FOR SCREENING MAMMOGRAM: ICD-10-CM

## 2020-08-12 PROCEDURE — 77063 BREAST TOMOSYNTHESIS BI: CPT

## 2020-08-12 RX ORDER — PRAVASTATIN SODIUM 80 MG/1
TABLET ORAL
Qty: 90 TAB | Refills: 3 | Status: SHIPPED | OUTPATIENT
Start: 2020-08-12 | End: 2021-02-11 | Stop reason: SDUPTHER

## 2020-08-13 NOTE — TELEPHONE ENCOUNTER
Refill request(s) approved--pravastatin. Refill protocol details (computer-generated) reviewed, as available. [employer] message to pt--with lab results.

## 2020-09-10 ENCOUNTER — TELEPHONE (OUTPATIENT)
Dept: ONCOLOGY | Age: 70
End: 2020-09-10

## 2020-09-10 DIAGNOSIS — Z90.11 H/O MASTECTOMY, RIGHT: ICD-10-CM

## 2020-09-10 DIAGNOSIS — Z85.3 HISTORY OF BREAST CANCER: Primary | ICD-10-CM

## 2020-09-10 NOTE — TELEPHONE ENCOUNTER
Patient called leaving a voicemail and stated that she has an appointment on October 5 with us as well as [facility]. They are requesting that a prescription for a prosthesis and bras be faxed over to [facility].

## 2020-09-18 NOTE — TELEPHONE ENCOUNTER
Patient called and stated that she called last week and has not heard anything. Informed patient of the information below. Patient stated thank you, I was not aware as no one called to let me know.

## 2020-10-05 ENCOUNTER — OFFICE VISIT (OUTPATIENT)
Dept: ONCOLOGY | Age: 70
End: 2020-10-05
Payer: MEDICARE

## 2020-10-05 VITALS
WEIGHT: 157.6 LBS | TEMPERATURE: 97.3 F | OXYGEN SATURATION: 98 % | SYSTOLIC BLOOD PRESSURE: 137 MMHG | DIASTOLIC BLOOD PRESSURE: 81 MMHG | HEART RATE: 64 BPM | BODY MASS INDEX: 28.83 KG/M2

## 2020-10-05 DIAGNOSIS — M47.22 OSTEOARTHRITIS OF SPINE WITH RADICULOPATHY, CERVICAL REGION: ICD-10-CM

## 2020-10-05 DIAGNOSIS — M19.90 ARTHRITIS: ICD-10-CM

## 2020-10-05 DIAGNOSIS — I25.10 CORONARY ARTERY DISEASE INVOLVING NATIVE CORONARY ARTERY OF NATIVE HEART WITHOUT ANGINA PECTORIS: ICD-10-CM

## 2020-10-05 DIAGNOSIS — I10 ESSENTIAL HYPERTENSION: ICD-10-CM

## 2020-10-05 DIAGNOSIS — Z85.3 HISTORY OF BREAST CANCER: Primary | ICD-10-CM

## 2020-10-05 DIAGNOSIS — Z90.11 H/O MASTECTOMY, RIGHT: ICD-10-CM

## 2020-10-05 PROCEDURE — G8419 CALC BMI OUT NRM PARAM NOF/U: HCPCS | Performed by: INTERNAL MEDICINE

## 2020-10-05 PROCEDURE — G8754 DIAS BP LESS 90: HCPCS | Performed by: INTERNAL MEDICINE

## 2020-10-05 PROCEDURE — G8427 DOCREV CUR MEDS BY ELIG CLIN: HCPCS | Performed by: INTERNAL MEDICINE

## 2020-10-05 PROCEDURE — G8752 SYS BP LESS 140: HCPCS | Performed by: INTERNAL MEDICINE

## 2020-10-05 PROCEDURE — 1101F PT FALLS ASSESS-DOCD LE1/YR: CPT | Performed by: INTERNAL MEDICINE

## 2020-10-05 PROCEDURE — G9899 SCRN MAM PERF RSLTS DOC: HCPCS | Performed by: INTERNAL MEDICINE

## 2020-10-05 PROCEDURE — 1090F PRES/ABSN URINE INCON ASSESS: CPT | Performed by: INTERNAL MEDICINE

## 2020-10-05 PROCEDURE — G8510 SCR DEP NEG, NO PLAN REQD: HCPCS | Performed by: INTERNAL MEDICINE

## 2020-10-05 PROCEDURE — 99213 OFFICE O/P EST LOW 20 MIN: CPT | Performed by: INTERNAL MEDICINE

## 2020-10-05 PROCEDURE — G8536 NO DOC ELDER MAL SCRN: HCPCS | Performed by: INTERNAL MEDICINE

## 2020-10-05 PROCEDURE — 3017F COLORECTAL CA SCREEN DOC REV: CPT | Performed by: INTERNAL MEDICINE

## 2020-10-05 PROCEDURE — G8399 PT W/DXA RESULTS DOCUMENT: HCPCS | Performed by: INTERNAL MEDICINE

## 2020-10-05 NOTE — PROGRESS NOTES
Mannie Elias is a 72 y.o. female  Chief Complaint   Patient presents with    Follow-up    Breast Cancer     1. Have you been to the ER, urgent care clinic since your last visit? Hospitalized since your last visit? No.    2. Have you seen or consulted any other health care providers outside of the [address] [place] [address] since your last visit? Include any pap smears or colon screening.  No.

## 2020-10-05 NOTE — PROGRESS NOTES
Fernande Lundborg is a 72 y.o. 1950 female and presents with Follow-up and Breast Cancer    CC: Breast Cancer Dx 5/12    STAGE:  2  T2N1a, ER- PA-Her2 amplified, surgical path her2 neg mammoprint triple neg    TREATMENT COURSE:  · Right mastectomy 6/14/12  · TC x6 (8/27/12-12/10/12)  · XRT done 2/13    HPI: Patient is here today for breast cancer 15 mo f/u for triple neg disease hx of RIGHT mast not on any therapy. She continues to do well overall. She reports that she feels well. She has no new concerns or complaints today. LEFT Mammo good 8/20. Labs stable/good with PCP   She is here alone today. DX:   Encounter Diagnoses   Name Primary?  History of breast cancer Yes    Essential hypertension     H/O mastectomy, right     Arthritis     Coronary artery disease involving native coronary artery of native heart without angina pectoris     Osteoarthritis of spine with radiculopathy, cervical region       Past Medical History:   Diagnosis Date    Arthritis     toe, back    Asthma     as a teenager    Breast cancer ([facility] [address]     Right Breast @ age 62 (2012)    Cancer [facility])      right breast cancer mastectomy    GERD (gastroesophageal reflux disease)     Hypertension     Osteoarthritis     S/P radiotherapy     Right Breast 2012    Thyroid disease     did take meds but now off     Past Surgical History:   Procedure Laterality Date    BREAST SURGERY PROCEDURE UNLISTED  6/14/12    RIGHT BREAST MASTECTOMY WITH RIGHT SENTINEL NODE BIOPSY, PORT A CATH INSERTION WITH ULTRASOUND; INFUSAPORT INSERTION; SENTINEL NODE BIOPSY.     CARDIAC SURG PROCEDURE UNLIST      attempted PTCA    CHEST SURGERY PROCEDURE UNLISTED Left 2012    Port Placement    HX BREAST BIOPSY  5/1/12    RIGHT breast biopsy - POSITIVE    HX BREAST BIOPSY  5/22/12    LEFT breast biopsy - BENIGN    HX MASTECTOMY Right     June 2012 w/ Chemo & Radiation    HX ORTHOPAEDIC      4th toe bilaterally    HX PARTIAL HYSTERECTOMY  1983 prolapsed uterus    HX VASCULAR ACCESS      portacath     Social History     Socioeconomic History    Marital status: SINGLE     Spouse name: Not on file    Number of children: Not on file    Years of education: Not on file    Highest education level: Not on file   Tobacco Use    Smoking status: Never Smoker    Smokeless tobacco: Never Used   Substance and Sexual Activity    Alcohol use: No     Alcohol/week: 0.0 standard drinks    Drug use: No     Types: Prescription, OTC    Sexual activity: Not Currently     Partners: Male     Family History   Problem Relation Age of Onset    Diabetes Mother     Cancer Father         prostate    Cancer Maternal Aunt         breast cancer; mastectomy, now age 76   Herlinda Piles Breast Cancer Maternal Aunt         42's    Breast Cancer Maternal Aunt         60's     Current Outpatient Medications   Medication Sig Dispense Refill    MASTECTOMY PROSTHESIS misc Bra and prosthesis / breast cancer hx mastectomy 4 Each 1    pravastatin (PRAVACHOL) 80 mg tablet TAKE 1 TABLET BY MOUTH  NIGHTLY 90 Tab 3    benazepriL (LOTENSIN) 20 mg tablet TAKE 1 TABLET BY MOUTH  DAILY 90 Tab 3    atenoloL (TENORMIN) 50 mg tablet TAKE 1 TABLET BY MOUTH  DAILY 90 Tab 1    OTHER Dispense blood pressure monitoring kit to pt. Please ensure non-wrist cuff device, with appropriately sized cuff to allow for accurate home BP monitoring. 1 Each 0    albuterol (PROVENTIL HFA, VENTOLIN HFA, PROAIR HFA) 90 mcg/actuation inhaler Take 2 Puffs by inhalation every four (4) hours as needed for Wheezing. Indications: bronchospasm prevention 1 Inhaler 3    amLODIPine (NORVASC) 5 mg tablet Take 1 Tab by mouth daily. 90 Tab 3    ascorbic acid, vitamin C, (VITAMIN C) 500 mg tablet Take 1,000 mg by mouth daily.  COD LIVER OIL PO Take 1 Cap by mouth daily.  triamcinolone (NASACORT AQ) 55 mcg nasal inhaler 2 Sprays daily.  glucosamine-chondroitin (ARTHX) 500-400 mg cap Take 1 Cap by mouth daily.       naproxen sodium 220 mg cap Take 3 Tabs by mouth as needed.  cyanocobalamin 1,000 mcg tablet Take 2,000 mcg by mouth daily.  lansoprazole (PREVACID) 15 mg capsule Take 15 mg by mouth as needed.  cetirizine (ZYRTEC) 10 mg tablet Take 10 mg by mouth daily.  cholecalciferol, vitamin D3, 2,000 unit tab Take 2,000 Units by mouth.  aspirin (ASPIRIN) 325 mg tablet Take 1 Tab by mouth daily. 1 Tab 0     Allergies   Allergen Reactions    Penicillins Hives    Bactrim [Sulfamethoprim Ds] Rash    Ciprofloxacin (Bulk) Rash     Review of Systems    A comprehensive review of systems was negative except for what is discussed in HPI. Objective:  Visit Vitals  /81 (BP 1 Location: Left arm, BP Patient Position: Sitting)   Pulse 64   Temp 97.3 °F (36.3 °C) (Temporal)   Wt 157 lb 9.6 oz (71.5 kg)   SpO2 98%   BMI 28.83 kg/m²     Physical Exam:   General appearance - Alert, well appearing, and in no distress, oriented to person, place, and time and normal appearing weight  Mental status - Alert, oriented to person, place, and time, normal mood, behavior, speech, dress, motor activity, and thought processes. HEENT - Conj clear  Neck - Supple  CV - Regular  Resp - CTA bilat, no wheezes, rales or rhonchi. GI - Soft, nontender  Ext - No edema noted, skin warm and dry  Neuro - Alert, oriented, normal speech, no focal findings   Breast - Right mast with tight scars/no masses, left breast with no palpable masses     Diagnostic Imaging Reviewed    [facility] Results (most recent):  Results from Hospital Encounter encounter on 08/12/20   [facility] 3D MARIO W MAMMO LT SCREENING INCL CAD    Narrative STUDY: Left unilateral digital screening mammogram with 3-D tomosynthesis    INDICATION:  Screening. COMPARISON: Most recent, 8/9/2019    BREAST COMPOSITION: There are scattered areas of fibroglandular density.     FINDINGS: Left unilateral digital screening mammography was performed and is  interpreted in conjunction with a computer assisted detection (CAD) system. Additionally, tomosynthesis of the left breast in the CC and MLO projections was  performed. No suspicious masses or calcifications are identified. There has been  no significant change. Impression IMPRESSION:  BI-RADS 1: Negative. No mammographic evidence of malignancy. RECOMMENDATIONS:  Next screening mammogram is recommended in one year. The patient will be notified of these results. DEXA Results (most recent):  Results from Hospital Encounter encounter on 09/20/17   DEXA BONE DENSITY STUDY AXIAL    Narrative Bone Mineral Density     Indication: Osteoporosis screening  Age: 73  Sex: Female. Menopause status: postmenopausal.  Hormone replacement therapy: No     Risk factors for fall:   None. Risk factors for osteoporosis:  None. Current medication for osteoporosis: Calcium and vitamin D. Comparison: None. Technique: Imaging was performed on the [employer]. Excluded sites: Lumbar spine due to degenerative changes      Findings: Total hip: Right  Bone mineral density (gm/cm2):  1.156  % of peak bone mass: 115  % for age matched controls:  80  T score: 1.2  Z score:  1.2    Femoral neck: Left  Bone mineral density (gm/cm2):  1.048  % of peak bone mass: 101  % for age matched controls:  80  T score: 0.1  Z score:  0.5    Forearm: Right  Bone mineral density (gm/cm2):  0.876  % of peak bone mass: 99  % for age matched controls:  80  T score: -0.1  Z score:  0.6               Impression Impression: This patient is normal using the World Health Organization criteria       Lab Results  Per PCP    Lab Results   Component Value Date/Time    WBC 6.3 07/15/2020 08:50 AM     Lab Results   Component Value Date/Time    Sodium 142 07/15/2020 08:50 AM     Assessment/Plan:    1. Stage 2 triple neg Breast cancer hx of mast/ chest wall XRT. Last seen here 7/19 per pt preference. Seen today in office during [facility].     Mammogram 8/20 with no evidence of recurrent disease. NERD. Continues to do well clinically. Labs per PCP. 2.  HTN. Management/  Bone Health/  Vitamin D Deficiency. On Vitamin D, management per PCP. 3. Psychosocial. Mood good. Has good family support. Watches a 3 yo. Follow up in 12 months, sooner if needed. Instructed to call with any questions or concerns.     Gurinder Johnson, DO

## 2020-11-06 NOTE — PATIENT INSTRUCTIONS
1.  Get diazepam filled, take every 8 hours as needed for neck pain/muscle spams    2. If no improvement with the diazepam, then switch to flexeril. Do NOT take both of these at the same time    3. Take ibuprofen 800mg 3x/day for 5-7 days    4. Heat as needed    5. Schedule your XRay (of cervical spine) and your DEXA (Bone Density)     Neck Pain: Care Instructions  Your Care Instructions  You can have neck pain anywhere from the bottom of your head to the top of your shoulders. It can spread to the upper back or arms. Injuries, painting a ceiling, sleeping with your neck twisted, staying in one position for too long, and many other activities can cause neck pain. Most neck pain gets better with home care. Your doctor may recommend medicine to relieve pain or relax your muscles. He or she may suggest exercise and physical therapy to increase flexibility and relieve stress. You may need to wear a special (cervical) collar to support your neck for a day or two. Follow-up care is a key part of your treatment and safety. Be sure to make and go to all appointments, and call your doctor if you are having problems. It's also a good idea to know your test results and keep a list of the medicines you take. How can you care for yourself at home? · Try using a heating pad on a low or medium setting for 15 to 20 minutes every 2 or 3 hours. Try a warm shower in place of one session with the heating pad. · You can also try an ice pack for 10 to 15 minutes every 2 to 3 hours. Put a thin cloth between the ice and your skin. · Take pain medicines exactly as directed. ¨ If the doctor gave you a prescription medicine for pain, take it as prescribed. ¨ If you are not taking a prescription pain medicine, ask your doctor if you can take an over-the-counter medicine. · If your doctor recommends a cervical collar, wear it exactly as directed. When should you call for help?   Call your doctor now or seek immediate medical care The deep fascial layer was closed with suture. if:  · You have new or worsening numbness in your arms, buttocks or legs. · You have new or worsening weakness in your arms or legs. (This could make it hard to stand up.)  · You lose control of your bladder or bowels. Watch closely for changes in your health, and be sure to contact your doctor if:  · Your neck pain is getting worse. · You are not getting better after 1 week. · You do not get better as expected. Where can you learn more? Go to http://natalie-jeet.info/. Enter [id number] in the search box to learn more about \"Neck Pain: Care Instructions. \"  Current as of: March 21, 2017  Content Version: 11.3  © 2388-1674 [employer]. Care instructions adapted under license by [employer] (which disclaims liability or warranty for this information). If you have questions about a medical condition or this instruction, always ask your healthcare professional. [place] [address] any warranty or liability for your use of this information. Neck: Exercises  Your Care Instructions  Here are some examples of typical rehabilitation exercises for your condition. Start each exercise slowly. Ease off the exercise if you start to have pain. Your doctor or physical therapist will tell you when you can start these exercises and which ones will work best for you. How to do the exercises  Note: Stretching should make you feel a gentle stretch, but no pain. Stop any strengthening exercise that makes pain worse. Neck stretch    1. This stretch works best if you keep your shoulder down as you lean away from it. To help you remember to do this, start by relaxing your shoulders and lightly holding on to your thighs or your chair. 2. Tilt your head toward your shoulder and hold for 15 to 30 seconds. Let the weight of your head stretch your muscles. 3. If you would like a little added stretch, use your hand to gently and steadily pull your head toward your shoulder.  For example, keeping your right shoulder down, lean your head to the left. 4. Repeat 2 to 4 times toward each shoulder. Diagonal neck stretch    1. Turn your head slightly toward the direction you will be stretching, and tilt your head diagonally toward your chest and hold for 15 to 30 seconds. 2. If you would like a little added stretch, use your hand to gently and steadily pull your head forward on the diagonal.  3. Repeat 2 to 4 times toward each side. Dorsal glide stretch    1. Sit or stand tall and look straight ahead. 2. Slowly tuck your chin as you glide your head backward over your body  3. Hold for a count of 6, and then relax for up to 10 seconds. 4. Repeat 8 to 12 times. Note: The dorsal glide stretches the back of the neck. If you feel pain, do not glide so far back. Some people find this exercise easier to do while lying on their backs with an ice pack on the neck. Chest and shoulder stretch    1. Sit or stand tall and glide your head backward as in the dorsal glide stretch. 2. Raise both arms so that your hands are next to your ears. 3. Take a deep breath, and as you breathe out, lower your elbows down and behind your back. You will feel your shoulder blades slide down and together, and at the same time you will feel a stretch across your chest and the front of your shoulders. 4. Hold for about 6 seconds, and then relax for up to 10 seconds. 5. Repeat 8 to 12 times. Strengthening: Hands on head    1. Move your head backward, forward, and side to side against gentle pressure from your hands, holding each position for about 6 seconds. 2. Repeat 8 to 12 times. Follow-up care is a key part of your treatment and safety. Be sure to make and go to all appointments, and call your doctor if you are having problems. It's also a good idea to know your test results and keep a list of the medicines you take. Where can you learn more? Go to http://natalie-jeet.info/.   Enter P975 in the search box to learn more about \"Neck: Exercises. \"  Current as of: March 21, 2017  Content Version: 11.3  © 9810-7002 [employer]. Care instructions adapted under license by [employer] (which disclaims liability or warranty for this information). If you have questions about a medical condition or this instruction, always ask your healthcare professional. [address] any warranty or liability for your use of this information. Neck: Exercises  Your Care Instructions  Here are some examples of typical rehabilitation exercises for your condition. Start each exercise slowly. Ease off the exercise if you start to have pain. Your doctor or physical therapist will tell you when you can start these exercises and which ones will work best for you. How to do the exercises  Note: Stretching should make you feel a gentle stretch, but no pain. Stop any strengthening exercise that makes pain worse. Neck stretch    5. This stretch works best if you keep your shoulder down as you lean away from it. To help you remember to do this, start by relaxing your shoulders and lightly holding on to your thighs or your chair. 6. Tilt your head toward your shoulder and hold for 15 to 30 seconds. Let the weight of your head stretch your muscles. 7. If you would like a little added stretch, use your hand to gently and steadily pull your head toward your shoulder. For example, keeping your right shoulder down, lean your head to the left. 8. Repeat 2 to 4 times toward each shoulder. Diagonal neck stretch    4. Turn your head slightly toward the direction you will be stretching, and tilt your head diagonally toward your chest and hold for 15 to 30 seconds. 5. If you would like a little added stretch, use your hand to gently and steadily pull your head forward on the diagonal.  6. Repeat 2 to 4 times toward each side. Dorsal glide stretch    5.  Sit or stand tall and look straight ahead.  6. Slowly tuck your chin as you glide your head backward over your body  7. Hold for a count of 6, and then relax for up to 10 seconds. 8. Repeat 8 to 12 times. Note: The dorsal glide stretches the back of the neck. If you feel pain, do not glide so far back. Some people find this exercise easier to do while lying on their backs with an ice pack on the neck. Chest and shoulder stretch    6. Sit or stand tall and glide your head backward as in the dorsal glide stretch. 7. Raise both arms so that your hands are next to your ears. 8. Take a deep breath, and as you breathe out, lower your elbows down and behind your back. You will feel your shoulder blades slide down and together, and at the same time you will feel a stretch across your chest and the front of your shoulders. 9. Hold for about 6 seconds, and then relax for up to 10 seconds. 10. Repeat 8 to 12 times. Strengthening: Hands on head    3. Move your head backward, forward, and side to side against gentle pressure from your hands, holding each position for about 6 seconds. 4. Repeat 8 to 12 times. Follow-up care is a key part of your treatment and safety. Be sure to make and go to all appointments, and call your doctor if you are having problems. It's also a good idea to know your test results and keep a list of the medicines you take. Where can you learn more? Go to http://natalie-jeet.info/. Enter P975 in the search box to learn more about \"Neck: Exercises. \"  Current as of: March 21, 2017  Content Version: 11.3  © 3818-9496 [employer]. Care instructions adapted under license by "[employer] (which disclaims liability or warranty for this information). If you have questions about a medical condition or this instruction, always ask your healthcare professional. [address] any warranty or liability for your use of this information.        Neck Pain: Care Instructions  Your Care Instructions  You can have neck pain anywhere from the bottom of your head to the top of your shoulders. It can spread to the upper back or arms. Injuries, painting a ceiling, sleeping with your neck twisted, staying in one position for too long, and many other activities can cause neck pain. Most neck pain gets better with home care. Your doctor may recommend medicine to relieve pain or relax your muscles. He or she may suggest exercise and physical therapy to increase flexibility and relieve stress. You may need to wear a special (cervical) collar to support your neck for a day or two. Follow-up care is a key part of your treatment and safety. Be sure to make and go to all appointments, and call your doctor if you are having problems. It's also a good idea to know your test results and keep a list of the medicines you take. How can you care for yourself at home? · Try using a heating pad on a low or medium setting for 15 to 20 minutes every 2 or 3 hours. Try a warm shower in place of one session with the heating pad. · You can also try an ice pack for 10 to 15 minutes every 2 to 3 hours. Put a thin cloth between the ice and your skin. · Take pain medicines exactly as directed. ¨ If the doctor gave you a prescription medicine for pain, take it as prescribed. ¨ If you are not taking a prescription pain medicine, ask your doctor if you can take an over-the-counter medicine. · If your doctor recommends a cervical collar, wear it exactly as directed. When should you call for help? Call your doctor now or seek immediate medical care if:  · You have new or worsening numbness in your arms, buttocks or legs. · You have new or worsening weakness in your arms or legs. (This could make it hard to stand up.)  · You lose control of your bladder or bowels. Watch closely for changes in your health, and be sure to contact your doctor if:  · Your neck pain is getting worse.   · You are not getting better after 1 week.  · You do not get better as expected. Where can you learn more? Go to http://natalie-jeet.info/. Enter [id number] in the search box to learn more about \"Neck Pain: Care Instructions. \"  Current as of: March 21, 2017  Content Version: 11.3  © 4407-6235 [employer]. Care instructions adapted under license by [employer] (which disclaims liability or warranty for this information). If you have questions about a medical condition or this instruction, always ask your healthcare professional. [address] any warranty or liability for your use of this information.

## 2020-11-12 ENCOUNTER — OFFICE VISIT (OUTPATIENT)
Dept: INTERNAL MEDICINE CLINIC | Age: 70
End: 2020-11-12
Payer: MEDICARE

## 2020-11-12 VITALS
BODY MASS INDEX: 29.33 KG/M2 | DIASTOLIC BLOOD PRESSURE: 78 MMHG | HEART RATE: 65 BPM | SYSTOLIC BLOOD PRESSURE: 135 MMHG | WEIGHT: 159.38 LBS | HEIGHT: 62 IN | OXYGEN SATURATION: 98 % | TEMPERATURE: 97.8 F | RESPIRATION RATE: 14 BRPM

## 2020-11-12 DIAGNOSIS — R10.9 LEFT FLANK PAIN: ICD-10-CM

## 2020-11-12 DIAGNOSIS — Z00.00 MEDICARE ANNUAL WELLNESS VISIT, SUBSEQUENT: Primary | ICD-10-CM

## 2020-11-12 DIAGNOSIS — I10 ESSENTIAL HYPERTENSION: ICD-10-CM

## 2020-11-12 DIAGNOSIS — Z13.31 SCREENING FOR DEPRESSION: ICD-10-CM

## 2020-11-12 DIAGNOSIS — R73.03 PREDIABETES: ICD-10-CM

## 2020-11-12 PROCEDURE — G8399 PT W/DXA RESULTS DOCUMENT: HCPCS | Performed by: INTERNAL MEDICINE

## 2020-11-12 PROCEDURE — G0439 PPPS, SUBSEQ VISIT: HCPCS | Performed by: INTERNAL MEDICINE

## 2020-11-12 PROCEDURE — G8419 CALC BMI OUT NRM PARAM NOF/U: HCPCS | Performed by: INTERNAL MEDICINE

## 2020-11-12 PROCEDURE — G8427 DOCREV CUR MEDS BY ELIG CLIN: HCPCS | Performed by: INTERNAL MEDICINE

## 2020-11-12 PROCEDURE — 99214 OFFICE O/P EST MOD 30 MIN: CPT | Performed by: INTERNAL MEDICINE

## 2020-11-12 PROCEDURE — 3017F COLORECTAL CA SCREEN DOC REV: CPT | Performed by: INTERNAL MEDICINE

## 2020-11-12 PROCEDURE — G9899 SCRN MAM PERF RSLTS DOC: HCPCS | Performed by: INTERNAL MEDICINE

## 2020-11-12 PROCEDURE — G8536 NO DOC ELDER MAL SCRN: HCPCS | Performed by: INTERNAL MEDICINE

## 2020-11-12 PROCEDURE — G8432 DEP SCR NOT DOC, RNG: HCPCS | Performed by: INTERNAL MEDICINE

## 2020-11-12 PROCEDURE — G8752 SYS BP LESS 140: HCPCS | Performed by: INTERNAL MEDICINE

## 2020-11-12 PROCEDURE — G8754 DIAS BP LESS 90: HCPCS | Performed by: INTERNAL MEDICINE

## 2020-11-12 PROCEDURE — 1090F PRES/ABSN URINE INCON ASSESS: CPT | Performed by: INTERNAL MEDICINE

## 2020-11-12 PROCEDURE — 1101F PT FALLS ASSESS-DOCD LE1/YR: CPT | Performed by: INTERNAL MEDICINE

## 2020-11-12 NOTE — PROGRESS NOTES
RM:16    Chief Complaint   Patient presents with   Weeks Annual Wellness Visit     Patient states that she would like to go over some concerns with the shringrix  and blood work for cancer screening      Abuse Screening Questionnaire 11/12/2020   Do you ever feel afraid of your partner? N   Are you in a relationship with someone who physically or mentally threatens you? N   Is it safe for you to go home? Y     3 most recent PHQ Screens 10/5/2020   Little interest or pleasure in doing things Not at all   Feeling down, depressed, irritable, or hopeless Not at all   Total Score PHQ 2 0     ADL Assessment 11/12/2020   Feeding yourself No Help Needed   Getting from bed to chair No Help Needed   Getting dressed No Help Needed   Bathing or showering No Help Needed   Walk across the room (includes cane/walker) No Help Needed   Using the telphone No Help Needed   Taking your medications No Help Needed   Preparing meals No Help Needed   Managing money (expenses/bills) No Help Needed   Moderately strenuous housework (laundry) No Help Needed   Shopping for personal items (toiletries/medicines) No Help Needed   Shopping for groceries No Help Needed   Driving No Help Needed   Climbing a flight of stairs No Help Needed   Getting to places beyond walking distances No Help Needed     Fall Risk Assessment, last 12 mths 11/12/2020   Able to walk? Yes   Fall in past 12 months? No   Fall with injury? -   Number of falls in past 12 months -   Fall Risk Score -       Visit Vitals  /78 (BP 1 Location: Left arm, BP Patient Position: Sitting)   Pulse 65   Temp 97.8 °F (36.6 °C) (Oral)   Resp 14   Ht 5' 2\" (1.575 m)   Wt 159 lb 6 oz (72.3 kg)   SpO2 98%   BMI 29.15 kg/m²     1. Have you been to the ER, urgent care clinic since your last visit? Hospitalized since your last visit? No    2. Have you seen or consulted any other health care providers outside of the [address] [place] [address] since your last visit?   Include any pap smears or colon screening.  No

## 2020-11-12 NOTE — PATIENT INSTRUCTIONS
Medicare Wellness Visit, Female     The best way to live healthy is to have a lifestyle where you eat a well-balanced diet, exercise regularly, limit alcohol use, and quit all forms of tobacco/nicotine, if applicable. Regular preventive services are another way to keep healthy. Preventive services (vaccines, screening tests, monitoring & exams) can help personalize your care plan, which helps you manage your own care. Screening tests can find health problems at the earliest stages, when they are easiest to treat. [facility] follows the current, evidence-based guidelines published by the [facility] Keshawn Jamison ([facility]) when recommending preventive services for our patients. Because we follow these guidelines, sometimes recommendations change over time as research supports it. (For example, mammograms used to be recommended annually. Even though Medicare will still pay for an annual mammogram, the newer guidelines recommend a mammogram every two years for women of average risk). Of course, you and your doctor may decide to screen more often for some diseases, based on your risk and your co-morbidities (chronic disease you are already diagnosed with). Preventive services for you include:  - Medicare offers their members a free annual wellness visit, which is time for you and your primary care provider to discuss and plan for your preventive service needs. Take advantage of this benefit every year!  -All adults over the age of 72 should receive the recommended pneumonia vaccines. Current USPSTF guidelines recommend a series of two vaccines for the best pneumonia protection.   -All adults should have a flu vaccine yearly and a tetanus vaccine every 10 years.   -All adults age 48 and older should receive the shingles vaccines (series of two vaccines).       -All adults age 38-68 who are overweight should have a diabetes screening test once every three years.   -All adults born between [birth year] and 1964 should be screened once for Hepatitis C.  -Other screening tests and preventive services for persons with diabetes include: an eye exam to screen for diabetic retinopathy, a kidney function test, a foot exam, and stricter control over your cholesterol.   -Cardiovascular screening for adults with routine risk involves an electrocardiogram (ECG) at intervals determined by your doctor.   -Colorectal cancer screenings should be done for adults age 54-65 with no increased risk factors for colorectal cancer. There are a number of acceptable methods of screening for this type of cancer. Each test has its own benefits and drawbacks. Discuss with your doctor what is most appropriate for you during your annual wellness visit. The different tests include: colonoscopy (considered the best screening method), a fecal occult blood test, a fecal DNA test, and sigmoidoscopy.    -A bone mass density test is recommended when a woman turns 65 to screen for osteoporosis. This test is only recommended one time, as a screening. Some providers will use this same test as a disease monitoring tool if you already have osteoporosis. -Breast cancer screenings are recommended every other year for women of normal risk, age 54-69.  -Cervical cancer screenings for women over age 72 are only recommended with certain risk factors. Here is a list of your current Health Maintenance items (your personalized list of preventive services) with a due date:  Health Maintenance Due   Topic Date Due    Shingles Vaccine (1 of 2) 10/05/2001    Colorectal Screening  06/03/2018    Glaucoma Screening   01/30/2020    Annual Well Visit  11/11/2020            Learning About Relief for Back Pain  What is back strain? Back strain is an injury that happens when you overstretch, or pull, a muscle in your back. You may hurt your back in an accident or when you exercise or lift something.  Most back pain gets better with rest and time. You can take care of yourself at home to help your back heal.  What can you do first to relieve back pain? When you first feel back pain, try these steps:  · Walk. Take a short walk (10 to 20 minutes) on a level surface (no slopes, hills, or stairs) every 2 to 3 hours. Walk only distances you can manage without pain, especially leg pain. · Relax. Find a comfortable position for rest. Some people are comfortable on the floor or a medium-firm bed with a small pillow under their head and another under their knees. Some people prefer to lie on their side with a pillow between their knees. Don't stay in one position for too long. · Try heat or ice. Try using a heating pad on a low or medium setting, or take a warm shower, for 15 to 20 minutes every 2 to 3 hours. Or you can buy single-use heat wraps that last up to 8 hours. You can also try an ice pack for 10 to 15 minutes every 2 to 3 hours. You can use an ice pack or a bag of frozen vegetables wrapped in a thin towel. There is not strong evidence that either heat or ice will help, but you can try them to see if they help. You may also want to try switching between heat and cold. · Take pain medicine exactly as directed. ? If the doctor gave you a prescription medicine for pain, take it as prescribed. ? If you are not taking a prescription pain medicine, ask your doctor if you can take an over-the-counter medicine. What else can you do? · Stretch and exercise. Exercises that increase flexibility may relieve your pain and make it easier for your muscles to keep your spine in a good, neutral position. And don't forget to keep walking. · Do self-massage. You can use self-massage to unwind after work or school or to energize yourself in the morning. You can easily massage your feet, hands, or neck. Self-massage works best if you are in comfortable clothes and are sitting or lying in a comfortable position. Use oil or lotion to massage bare skin.   · Reduce stress. Back pain can lead to a vicious [place]: Distress about the pain tenses the muscles in your back, which in turn causes more pain. Learn how to relax your mind and your muscles to lower your stress. Where can you learn more? Go to http://www.gray.com/  Enter Q517 in the search box to learn more about \"Learning About Relief for Back Pain. \"  Current as of: March 2, 2020               Content Version: 12.6  © 2006-2020 [employer]. Care instructions adapted under license by [employer] (which disclaims liability or warranty for this information). If you have questions about a medical condition or this instruction, always ask your healthcare professional. [place] [address] any warranty or liability for your use of this information. Back Pain: Care Instructions  Your Care Instructions     Back pain has many possible causes. It is often related to problems with muscles and ligaments of the back. It may also be related to problems with the nerves, discs, or bones of the back. Moving, lifting, standing, sitting, or sleeping in an awkward way can strain the back. Sometimes you don't notice the injury until later. Arthritis is another common cause of back pain. Although it may hurt a lot, back pain usually improves on its own within several weeks. Most people recover in 12 weeks or less. Using good home treatment and being careful not to stress your back can help you feel better sooner. Follow-up care is a key part of your treatment and safety. Be sure to make and go to all appointments, and call your doctor if you are having problems. It's also a good idea to know your test results and keep a list of the medicines you take. How can you care for yourself at home? · Sit or lie in positions that are most comfortable and reduce your pain. Try one of these positions when you lie down:  ?  Lie on your back with your knees bent and supported by large pillows. ? Lie on the floor with your legs on the seat of a sofa or chair. ? Lie on your side with your knees and hips bent and a pillow between your legs. ? Lie on your stomach if it does not make pain worse. · Do not sit up in bed, and avoid soft couches and twisted positions. Bed rest can help relieve pain at first, but it delays healing. Avoid bed rest after the first day of back pain. · Change positions every 30 minutes. If you must sit for long periods of time, take breaks from sitting. Get up and walk around, or lie in a comfortable position. · Try using a heating pad on a low or medium setting for 15 to 20 minutes every 2 or 3 hours. Try a warm shower in place of one session with the heating pad. · You can also try an ice pack for 10 to 15 minutes every 2 to 3 hours. Put a thin cloth between the ice pack and your skin. · Take pain medicines exactly as directed. ? If the doctor gave you a prescription medicine for pain, take it as prescribed. ? If you are not taking a prescription pain medicine, ask your doctor if you can take an over-the-counter medicine. · Take short walks several times a day. You can start with 5 to 10 minutes, 3 or 4 times a day, and work up to longer walks. Walk on level surfaces and avoid hills and stairs until your back is better. · Return to work and other activities as soon as you can. Continued rest without activity is usually not good for your back. · To prevent future back pain, do exercises to stretch and strengthen your back and stomach. Learn how to use good posture, safe lifting techniques, and proper body mechanics. When should you call for help? Call your doctor now or seek immediate medical care if:    · You have new or worsening numbness in your legs.     · You have new or worsening weakness in your legs. (This could make it hard to stand up.)     · You lose control of your bladder or bowels.    Watch closely for changes in your health, and be sure to contact your doctor if:    · You have a fever, lose weight, or don't feel well.     · You do not get better as expected. Where can you learn more? Go to http://www.P-[employer].com/  Enter I594 in the search box to learn more about \"Back Pain: Care Instructions. \"  Current as of: March 2, 2020               Content Version: 12.6  © 2074-3739 [employer]. Care instructions adapted under license by [employer] (which disclaims liability or warranty for this information). If you have questions about a medical condition or this instruction, always ask your healthcare professional. [address] any warranty or liability for your use of this information. Back Care and Preventing Injuries: Care Instructions  Your Care Instructions     You can hurt your back doing many everyday activities: lifting a heavy box, bending down to garden, exercising at the gym, and even getting out of bed. But you can keep your back strong and healthy by doing some exercises. You also can follow a few tips for sitting, sleeping, and lifting to avoid hurting your back again. Talk to your doctor before you start an exercise program. Ask for help if you want to learn more about keeping your back healthy. Follow-up care is a key part of your treatment and safety. Be sure to make and go to all appointments, and call your doctor if you are having problems. It's also a good idea to know your test results and keep a list of the medicines you take. How can you care for yourself at home? · Stay at a healthy weight to avoid strain on your lower back. · Do not smoke. Smoking increases the risk of osteoporosis, which weakens the spine. If you need help quitting, talk to your doctor about stop-smoking programs and medicines. These can increase your chances of quitting for good.   · Make sure you sleep in a position that maintains your back's normal curves and on a mattress that feels comfortable. Sleep on your side with a pillow between your knees, or sleep on your back with a pillow under your knees. These positions can reduce strain on your back. · When you get out of bed, lie on your side and bend both knees. Drop your feet over the edge of the bed as you push up with both arms. Scoot to the edge of the bed. Make sure your feet are in line with your rear end (buttocks), and then stand up. · If you must stand for a long time, put one foot on a stool, ledge, or box. Exercise to strengthen your back and other muscles  · Get at least 30 minutes of exercise on most days of the week. Walking is a good choice. You also may want to do other activities, such as running, swimming, cycling, or playing tennis or team sports. · Stretch your back muscles. Here are few exercises to try:  ? Lie on your back with your knees bent and your feet flat on the floor. Gently pull one bent knee to your chest. Put that foot back on the floor, and then pull the other knee to your chest. Hold for 15 to 30 seconds. Repeat 2 to 4 times. ? Do pelvic tilts. Lie on your back with your knees bent. Tighten your stomach muscles. Pull your belly button (navel) in and up toward your ribs. You should feel like your back is pressing to the floor and your hips and pelvis are slightly lifting off the floor. Hold for 6 seconds while breathing smoothly. · Keep your core muscles strong. The muscles of your back, belly (abdomen), and buttocks support your spine. ? Pull in your belly, and imagine pulling your navel toward your spine. Hold this for 6 seconds, then relax. Remember to keep breathing normally as you tense your muscles. ? Do curl-ups. Always do them with your knees bent. Keep your low back on the floor, and curl your shoulders toward your knees using a smooth, slow motion.  Keep your arms folded across your chest. If this bothers your neck, try putting your hands behind your neck (not your head), with your elbows spread apart. ? Lie on your back with your knees bent and your feet flat on the floor. Tighten your belly muscles, and then push with your feet and raise your buttocks up a few inches. Hold this position 6 seconds as you continue to breathe normally, then lower yourself slowly to the floor. Repeat 8 to 12 times. ? If you like group exercise, try Pilates or yoga. These classes have poses that strengthen the core muscles. Protect your back when you sit  · Place a small pillow, a rolled-up towel, or a lumbar roll in the curve of your back if you need extra support. · Sit in a chair that is low enough to let you place both feet flat on the floor with both knees nearly level with your hips. If your chair or desk is too high, use a foot rest to raise your knees. · When driving, keep your knees nearly level with your hips. Sit straight, and drive with both hands on the steering wheel. Your arms should be in a slightly bent position. · Try a kneeling chair, which helps tilt your hips forward. This takes pressure off your lower back. · Try sitting on an exercise ball. It can rock from side to side, which helps keep your back loose. Lift properly  · Squat down, bending at the hips and knees only. If you need to, put one knee to the floor and extend your other knee in front of you, bent at a right angle (half kneeling). · Press your chest straight forward. This helps keep your upper back straight while keeping a slight arch in your low back. · Hold the load as close to your body as possible, at the level of your navel. · Use your feet to change direction, taking small steps. · Lead with your hips as you change direction. Keep your shoulders in line with your hips as you move. Do not twist your body. · Set down your load carefully, squatting with your knees and hips only. When should you call for help? Watch closely for changes in your health, and be sure to contact your doctor if you have any problems.   Where can you learn more? Go to http://www.gray.com/  Enter S810 in the search box to learn more about \"Back Care and Preventing Injuries: Care Instructions. \"  Current as of: March 2, 2020               Content Version: 12.6  © 3332-7109 [employer]. Care instructions adapted under license by [employer] (which disclaims liability or warranty for this information). If you have questions about a medical condition or this instruction, always ask your healthcare professional. [address] any warranty or liability for your use of this information. Back Stretches: Exercises  Introduction  Here are some examples of exercises for stretching your back. Start each exercise slowly. Ease off the exercise if you start to have pain. Your doctor or physical therapist will tell you when you can start these exercises and which ones will work best for you. How to do the exercises  Overhead stretch   1. Stand comfortably with your feet shoulder-width apart. 2. Looking straight ahead, raise both arms over your head and reach toward the ceiling. Do not allow your head to tilt back. 3. Hold for 15 to 30 seconds, then lower your arms to your sides. 4. Repeat 2 to 4 times. Side stretch   1. Stand comfortably with your feet shoulder-width apart. 2. Raise one arm over your head, and then lean to the other side. 3. Slide your hand down your leg as you let the weight of your arm gently stretch your side muscles. Hold for 15 to 30 seconds. 4. Repeat 2 to 4 times on each side. Press-up   1. Lie on your stomach, supporting your body with your forearms. 2. Press your elbows down into the floor to raise your upper back. As you do this, relax your stomach muscles and allow your back to arch without using your back muscles. As your press up, do not let your hips or pelvis come off the floor. 3. Hold for 15 to 30 seconds, then relax. 4. Repeat 2 to 4 times. Relax and rest   1. Lie on your back with a rolled towel under your neck and a pillow under your knees. Extend your arms comfortably to your sides. 2. Relax and breathe normally. 3. Remain in this position for about 10 minutes. 4. If you can, do this 2 or 3 times each day. Follow-up care is a key part of your treatment and safety. Be sure to make and go to all appointments, and call your doctor if you are having problems. It's also a good idea to know your test results and keep a list of the medicines you take. Where can you learn more? Go to http://www.way.com/  Enter Y090 in the search box to learn more about \"Back Stretches: Exercises. \"  Current as of: March 2, 2020               Content Version: 12.6  © 1434-3207 [employer]wise[employer] Incorporated. Care instructions adapted under license by [employer] (which disclaims liability or warranty for this information). If you have questions about a medical condition or this instruction, always ask your healthcare professional. [place] [address] any warranty or liability for your use of this information. Therapeutic Ball: Back Exercises  Introduction  Here are some examples of typical exercises for your condition. Start each exercise slowly. Ease off the exercise if you start to have pain. Your doctor or physical therapist will tell you when you can start these exercises and which ones will work best for you. To prepare, make sure that your ball is the right size for you. When inflated and firm, it should allow you to sit with your hips and knees bent at about a 90-degree angle (like the letter L). How to do the exercises  Seated position on ball   5. Use this exercise to get used to moving on the ball and to find your best sitting position. 6. Sit comfortably on the ball with your feet about hip-width apart. If you feel unsteady, rest your hands on the ball near your hips.   7. As you do this exercise, try to keep your shoulders and upper body relaxed and still. 8. Using your stomach and back muscles to move your pelvis, roll the ball forward. This will round your back. 9. Still using your stomach and back muscles, roll the ball back. You will arch your back. 10. Repeat this rounding-arching motion a few times. 11. Stop in between the two positions, where your back is not rounded or arched. This is called your neutral position. Pelvic rotation   5. Sit tall on the ball. 6. Slowly rotate your hips in a [place] pattern. Keep the movement focused at your hips. 7. Repeat, but [place] in the other direction. 8. Repeat 8 to 12 times. Postural sitting   5. Use this position to find a stable, relaxed posture on the ball. You can use this position as your starting point for other ball exercises. If you feel unsteady on the ball, start on a chair first.  6. Sit on a ball or chair, with your feet planted straight in front of you. 7. Imagine that a string at the top of your head is pulling you straight up. Think of yourself as 2 inches taller than you are.  8. Slightly tuck your chin. 9. Keep your shoulders back and relaxed. Knee extension   5. Sit tall on the ball with your feet planted in front of you, hip-width apart. As you do this exercise, avoid slumping your shoulders and arching your back. 6. Rest your hands on the ball near your hip or a steady object next to you. (If you feel very stable on the ball, rest your hands in your lap or at your side.)  7. Slowly straighten one leg at the knee. Slowly lower it back down. Repeat with the other leg. 8. Repeat this exercise 8 to 12 times. Roll-ups   1. Lie on your back with your knees bent, feet resting on the floor. 2. Lay the ball on your thighs. Rest your hands up high on the ball. 3. Raising your head and shoulder blades, roll the ball up your thighs. Exhale as you roll up.   4. If this is hard on your neck, gently support your lower head and upper neck with one hand. Don't use that hand to pull your head up. 5. Repeat 8 to 12 times. Ball curls   1. Lie on your back with your ankles resting on the ball, knees straight. 2. Use your legs to roll the exercise ball toward you. Allow your knees to bend and move closer to your chest.  3. Pause briefly, and then roll the ball to the starting position. Try to keep the ball rolling straight. You will feel the muscles in your lower belly working. 4. Repeat 8 to 12 times. Bridge with ball under legs   1. Lie on your back with your legs up, calves resting on the ball. For more challenge, rest your heels on the ball. 2. Look up at the ceiling, and keep your chin relaxed. You can place a small pillow under your head or neck for comfort. 3. With your arms by your side, press your hands onto the floor for stability. 4. Tighten your belly muscles by pulling in your belly button toward your spine. 5. Push your heels down toward the floor, squeeze your buttocks, and lift your hips off the floor until your shoulders, hips, and knees are all in a straight line. 6. Try to keep the ball steady. Hold for about 6 seconds as you continue to breathe normally. 7. Slowly lower your hips back down to the floor. 8. Repeat 8 to 12 times. Ball curls with bridge   1. Start flat on your back with your ankles resting on the ball. 2. Look up at the ceiling, and keep your chin relaxed. You can place a small pillow under your head or neck for comfort. 3. With your arms by your side, press your hands onto the floor for stability. 4. Tighten your belly muscles by pulling in your belly button toward your spine. 5. Push your heels down toward the floor, squeeze your buttocks, and lift your hips off the floor until your shoulders, hips, and knees are all in a straight line. 6. While holding the bridge position, roll the ball toward you with your heels. Keep your hips as level as you can.   7. Pause briefly, and then roll the ball back out. Try to keep the ball rolling straight. You will feel the muscles in your lower belly working as you straighten your legs. 8. Lower your hips, and return to your starting position. 9. Repeat 8 to 12 times. 10. When you can keep your body and the ball steady throughout this exercise, you're ready for more challenge. Try keeping your hips raised while rolling the ball out, holding the bridge, and rolling back, a few times in a row. Praying noreen   1. Kneel upright with the ball in front of you. 2. To start, clasp your hands together. Rest them on the ball in front of you. 3. As you do this exercise, keep your back and hips straight and tighten your belly and buttocks muscles. Keep your knees in place. 4. Press on the ball with your arms. Lean forward from the knees. This rolls the ball forward. You will bear most of your weight on your arms. 5. If your back starts to ache, you've gone too far. Pull back a bit. 6. Roll back to the start position. 7. Repeat 8 to 12 times. Walk-out plank on ball   1. Kneel over the ball. Place your hands on the floor in front of you. 2. Walk your hands forward until your legs are straight on the ball. This is the plank position. 3. When in plank position, hold your body straight and tighten your belly and buttocks muscles. Keep your chin slightly tucked. 4. Roll as far forward as you can without losing your balance or letting your hips drop. You may stop with the ball under your thighs, or even under your knees or shins. 5. Hold a few seconds, then walk your hands back and return to the start position. 6. Repeat 8 to 12 times. Push-up with thighs on ball   1. Kneel over the ball. Place your hands on the floor in front of you. 2. Walk your hands forward until your legs are straight on the ball. This is the plank position. 3. When in plank position, hold your body straight and tighten your belly and buttocks muscles.  Keep your chin slightly tucked. 4. Roll as far forward as you can without losing your balance or letting your hips drop. You may stop with the ball under your thighs, or even under your knees or shins. 5. Bend your elbows. Slowly lower your body toward the ground as far as you can without losing your balance. 6. If your wrists hurt, try moving your hands a little farther apart so they're not right under your shoulders. 7. Slowly straighten your arms. 8. Do 8 to 12 of these push-ups. Wall squat with ball   1. Stand facing away from a wall. Place your feet about shoulder-width apart. 2. Place the ball between your middle back and the wall. Move your feet out in front of you so they are about a foot in front of your hips. 3. Keep your arms at your sides, or put your hands on your hips. 4. Slowly squat down as if you are going to sit in a chair, rolling your back over the ball as you squat. The ball should move with you but stay pressed into the wall. 5. Be sure that your knees do not go in front of your toes as you squat. 6. Hold for 6 seconds. 7. Slowly rise to your standing position. 8. Repeat 8 to 12 times. Child's pose with ball   1. Kneeling upright with your back straight, rest your hands on the ball in front of you. 2. Breathe out as you bend at the hips, and roll the ball forward. Lower your chest toward the ground, and drop your hips back toward your heels. 3. To stretch your upper back and shoulders, hold this position for 15 to 30 seconds. 4. Repeat 2 to 4 times. Follow-up care is a key part of your treatment and safety. Be sure to make and go to all appointments, and call your doctor if you are having problems. It's also a good idea to know your test results and keep a list of the medicines you take. Where can you learn more? Go to http://www.gray.com/  Enter G907 in the search box to learn more about \"Therapeutic Ball: Back Exercises. \"  Current as of: March 2, 2020               Content Version: 12.6  © 4271-8061 [employer]. Care instructions adapted under license by [employer] (which disclaims liability or warranty for this information). If you have questions about a medical condition or this instruction, always ask your healthcare professional. [address] any warranty or liability for your use of this information.

## 2020-11-12 NOTE — PROGRESS NOTES
This is a Subsequent Medicare Annual Wellness Exam (AWV) (Performed 12 months after IPPE or effective date of Medicare Part B enrollment)    I have reviewed the patient's medical history in detail and updated the computerized patient record. History     Chief Complaint   Patient presents with   Anmol Celis Annual Wellness Visit     Patient states that she would like to go over some concerns with the shringrix  and blood work for cancer screening      Reviewed VZV titer and Shingrix/shingles risk. Reviewed dosing and schedule for pt. She has clarified with pharmacy and insurance. She had questions about testing for history breast cancer. She has a question about B-HCG blood test as a tumor marker. She had read about this but her oncologist doesn't do it as part of her yearly check. She is trying to be proactive, and will ask Dr. [place] regarding this. She has updated and discussed with Dr. Sol Leventhal. Nursing screenings reviewed by provider at visit. Past Medical History:   Diagnosis Date    Arthritis     toe, back    Asthma     as a teenager    Breast cancer ([facility] [address]     Right Breast @ age 62 (2012)    Cancer [facility])      right breast cancer mastectomy    GERD (gastroesophageal reflux disease)     Hypertension     Osteoarthritis     S/P radiotherapy     Right Breast 2012    Thyroid disease     did take meds but now off      Past Surgical History:   Procedure Laterality Date    BREAST SURGERY PROCEDURE UNLISTED  6/14/12    RIGHT BREAST MASTECTOMY WITH RIGHT SENTINEL NODE BIOPSY, PORT A CATH INSERTION WITH ULTRASOUND; INFUSAPORT INSERTION; SENTINEL NODE BIOPSY.     CARDIAC SURG PROCEDURE UNLIST      attempted PTCA    CHEST SURGERY PROCEDURE UNLISTED Left 2012    Port Placement    HX BREAST BIOPSY  5/1/12    RIGHT breast biopsy - POSITIVE    HX BREAST BIOPSY  5/22/12    LEFT breast biopsy - BENIGN    HX MASTECTOMY Right     June 2012 w/ Chemo & Radiation    HX ORTHOPAEDIC      4th toe bilaterally    HX PARTIAL HYSTERECTOMY  1983    prolapsed uterus    HX VASCULAR ACCESS      portacath     Prior to Admission medications    Medication Sig Start Date End Date Taking? Authorizing Provider   MASTECTOMY PROSTHESIS misc Bra and prosthesis / breast cancer hx mastectomy 9/10/20  Yes Meera Call NP   pravastatin (PRAVACHOL) 80 mg tablet TAKE 1 TABLET BY MOUTH  NIGHTLY 8/12/20  Yes Lorraine Rodriguez MD   benazepriL (LOTENSIN) 20 mg tablet TAKE 1 TABLET BY MOUTH  DAILY 8/7/20  Yes Lorraine Rodriguez MD   atenoloL (TENORMIN) 50 mg tablet TAKE 1 TABLET BY MOUTH  DAILY 7/14/20  Yes Lorraine Rodriguez MD   OTHER Dispense blood pressure monitoring kit to pt. Please ensure non-wrist cuff device, with appropriately sized cuff to allow for accurate home BP monitoring. 7/6/20  Yes Lorraine Rodriguez MD   albuterol (PROVENTIL HFA, VENTOLIN HFA, PROAIR HFA) 90 mcg/actuation inhaler Take 2 Puffs by inhalation every four (4) hours as needed for Wheezing. Indications: bronchospasm prevention 4/4/20  Yes Lroraine Rodriguez MD   ascorbic acid, vitamin C, (VITAMIN C) 500 mg tablet Take 1,000 mg by mouth daily. Yes Provider, Historical   COD LIVER OIL PO Take 1 Cap by mouth daily. Yes Provider, Historical   glucosamine-chondroitin (ARTHX) 500-400 mg cap Take 1 Cap by mouth daily. Yes Provider, Historical   naproxen sodium 220 mg cap Take 3 Tabs by mouth as needed. Yes Provider, Historical   cyanocobalamin 1,000 mcg tablet Take 2,000 mcg by mouth daily. Yes Provider, Historical   lansoprazole (PREVACID) 15 mg capsule Take 15 mg by mouth as needed. Yes Provider, Historical   cetirizine (ZYRTEC) 10 mg tablet Take 10 mg by mouth daily. Yes Provider, Historical   cholecalciferol, vitamin D3, 2,000 unit tab Take 2,000 Units by mouth. Yes Provider, Historical   aspirin (ASPIRIN) 325 mg tablet Take 1 Tab by mouth daily.  6/20/12  Yes Bel Alvarado MD   amLODIPine (NORVASC) 5 mg tablet Take 1 Tab by mouth daily. 2/15/20   Jennie Correia MD   triamcinolone (NASACORT AQ) 55 mcg nasal inhaler 2 Sprays daily.     Provider, Historical       Allergies   Allergen Reactions    Penicillins Hives    Bactrim [Sulfamethoprim Ds] Rash    Ciprofloxacin (Bulk) Rash     Family History   Problem Relation Age of Onset    Diabetes Mother     Cancer Father         prostate    Cancer Maternal Aunt         breast cancer; mastectomy, now age 76    Breast Cancer Maternal Aunt         42's    Breast Cancer Maternal Aunt         63's     Social History     Tobacco Use    Smoking status: Never Smoker    Smokeless tobacco: Never Used   Substance Use Topics    Alcohol use: No     Alcohol/week: 0.0 standard drinks     Patient Active Problem List   Diagnosis Code    Breast cancer, stage 2 ([facility] [address] C50.919    Lymphedema of arm I89.0    S/P mastectomy Z90.10    Neuropathy G62.9    Essential hypertension I10    History of hyperthyroidism Z86.39    Hypercholesteremia E78.00    CAD (coronary artery disease), native coronary artery I25.10    Degenerative arthritis of left knee M17.12    History of colonoscopy Z98.890    Pain in right axilla M79.621    Rib pain on right side R07.81    Sacroiliac joint pain M53.3    Left knee pain M25.562    Arthritis of knee, left M17.12    ACP (advance care planning) Z71.89    Chest wall discomfort R07.89    H/O mammogram Z92.89    Osteoarthritis of spine with radiculopathy, cervical region M47.22    Prediabetes R73.03    Vitreous degeneration H43.819    H/O mastectomy, right Z90.11    Arthritis M19.90    Neck pain M54.2    Vitamin D deficiency E55.9    History of breast cancer Z85.3       Depression Risk Factor Screening:     3 most recent PHQ Screens 10/5/2020   Little interest or pleasure in doing things Not at all   Feeling down, depressed, irritable, or hopeless Not at all   Total Score PHQ 2 0       Alcohol Risk Factor Screening:   Do you average more than 1 drink per night or more than 7 drinks a week:  No  On any one occasion in the past three months have you have had more than 3 drinks containing alcohol:  No    Functional Ability and Level of Safety:     Hearing Loss  Hearing is good. Activities of Daily Living  The home contains: no safety equipment  See below:    ADL Assessment 11/12/2020   Feeding yourself No Help Needed   Getting from bed to chair No Help Needed   Getting dressed No Help Needed   Bathing or showering No Help Needed   Walk across the room (includes cane/walker) No Help Needed   Using the telphone No Help Needed   Taking your medications No Help Needed   Preparing meals No Help Needed   Managing money (expenses/bills) No Help Needed   Moderately strenuous housework (laundry) No Help Needed   Shopping for personal items (toiletries/medicines) No Help Needed   Shopping for groceries No Help Needed   Driving No Help Needed   Climbing a flight of stairs No Help Needed   Getting to places beyond walking distances No Help Needed       Fall Risk  Fall Risk Assessment, last 12 mths 11/12/2020   Able to walk? Yes   Fall in past 12 months? No   Fall with injury? -   Number of falls in past 12 months -   Fall Risk Score -       Abuse Screen  Patient is not abused    Abuse Screening Questionnaire 11/12/2020   Do you ever feel afraid of your partner? N   Are you in a relationship with someone who physically or mentally threatens you? N   Is it safe for you to go home? Y       Cognitive Screening   Evaluation of Cognitive Function:  Has your family/caregiver stated any concerns about your memory: no      Physical Exam[de-identified]     Blood pressure (!) 144/75, pulse 65, temperature 97.8 °F (36.6 °C), temperature source Oral, resp. rate 14, height 5' 2\" (1.575 m), weight 159 lb 6 oz (72.3 kg), SpO2 98 %.     Vitals:    11/12/20 1047 11/12/20 1059   BP: (!) 144/75 135/78   Pulse: 65    Resp: 14    Temp: 97.8 °F (36.6 °C)    TempSrc: Oral    SpO2: 98% Weight: 159 lb 6 oz (72.3 kg)    Height: 5' 2\" (1.575 m)        General appearance: alert, cooperative, no distress, appears stated age  Head: Normocephalic, without obvious abnormality, atraumatic  Eyes: conjunctivae/corneas clear. Ears: normal TM's and external ear canals AU  Nose: Nares normal. No drainage. Throat: Lips, mucosa, and tongue normal. Teeth and gums normal  Neck: supple, symmetrical, trachea midline, no adenopathy, thyroid: not enlarged, symmetric, no tenderness/mass/nodules  Back: symmetric, no curvature. ROM normal  Lungs: clear to auscultation bilaterally  Chest wall: no tenderness  Heart: regular rate and rhythm, S1, S2 normal, no murmur, click, rub or gallop  Abdomen: soft, non-tender.  Bowel sounds normal. No masses,  no organomegaly  Extremities: extremities normal, atraumatic, no cyanosis or edema  Pulses: 2+ and symmetric radial.  Skin: Skin color, texture, turgor normal. No rashes or lesions  Lymph nodes: Cervical nodes normal.  Neurologic: Grossly normal    Patient Care Team   Patient Care Team:  Erika Dillon MD as PCP - General (Infectious Disease)  Erika Dillon MD as PCP - [address] [place] [address] Provider  Jonna Lopez DO as Physician (Oncology)  Cheryl Omalley MD as Physician (Breast Surgery)  Claude Parkins, MD as Physician (Radiation Oncology)  Anastasiya Godinez MD (Cardiology)  Isha Lowery [place] as Physician Assistant (Physician Assistant)  Steven Oh MD (Breast Surgery)  Yany Reddy MD (Orthopedic Surgery)  Jos Pruett MD (Ophthalmology)    Advice/Referrals/Counseling   Education and counseling provided:  Are appropriate based on today's review and evaluation  Colorectal cancer screening tests  Screening for glaucoma    Health Maintenance Due   Topic Date Due    Shingrix Vaccine Age 49> (1 of 2) 10/05/2001    Colorectal Cancer Screening Combo  06/03/2018    GLAUCOMA SCREENING Q2Y  01/30/2020    Flu Vaccine (1) 09/01/2020    Medicare Yearly Exam  11/11/2020       Had prior colonoscopy 2018 per pt at last AWV. Release(s) completed at visit for:  Eye exam, colonoscopy. Assessment/Plan       ICD-10-CM ICD-9-CM    1. Medicare annual wellness visit, subsequent  Z00.00 V70.0    2. Screening for depression  Z13.31 V79.0    3. Essential hypertension  I10 401.9    4. Prediabetes  R73.03 790.29    5. Left flank pain  R10.9 789.09        Diagnoses #1-2 for Medicare Wellness/Preventive visit. Due to significant separate problems unrelated to [facility] Wellness Visit, also addressed following diagnoses/problems at visit as below (diagnoses #3-5 above). 1-2:  Screenings reviewed at visit. 3.  Medications and follow-up reviewed. 4.  Follow-up labs reviewed as below. 5.  Back exercises reviewed as per instructions. Follow-up and Dispositions    · Return in about 3 months (around 2/12/2021), or if symptoms worsen or fail to improve, for Blood Pressure follow-up, fasting labs. lab results and schedule of future lab studies reviewed with patient  reviewed medications and side effects in detail    Plan and evaluation (above) reviewed with pt at visit  Patient voiced understanding of plan and provided with time to ask/review questions. After Visit Summary (AVS) provided to pt after visit with additional instructions as needed/reviewed.       Future Appointments   Date Time Provider Michelle Morgan   10/6/2021  9:00 AM Simon Morataya  [address] BS AMB

## 2020-11-19 DIAGNOSIS — I10 ESSENTIAL HYPERTENSION: ICD-10-CM

## 2020-11-19 NOTE — TELEPHONE ENCOUNTER
PCP: Natalee Nicole MD    Last appt: 11/12/2020  Future Appointments   Date Time Provider Michelle Morgan   10/6/2021  9:00 AM Skyler Jordan,  [address] BS AMB       Requested Prescriptions     Pending Prescriptions Disp Refills    atenoloL (TENORMIN) 50 mg tablet 90 Tab 1     Sig: TAKE 1 TABLET BY MOUTH  DAILY     No visits with results within 3 Month(s) from this visit. Latest known visit with results is:   Office Visit on 07/06/2020   Component Date Value Ref Range Status    WBC 07/15/2020 6.3  3.4 - 10.8 x10E3/uL Final    RBC 07/15/2020 4.50  3.77 - 5.28 x10E6/uL Final    HGB 07/15/2020 12.0  11.1 - 15.9 g/dL Final    HCT 07/15/2020 38.4  34.0 - 46.6 % Final    MCV 07/15/2020 85  79 - 97 fL Final    MCH 07/15/2020 26.7  26.6 - 33.0 pg Final    MCHC 07/15/2020 31.3* 31.5 - 35.7 g/dL Final    RDW 07/15/2020 13.6  11.7 - 15.4 % Final    PLATELET 12/76/0888 939  150 - 450 x10E3/uL Final    NEUTROPHILS 07/15/2020 47  Not Estab. % Final    Lymphocytes 07/15/2020 40  Not Estab. % Final    MONOCYTES 07/15/2020 9  Not Estab. % Final    EOSINOPHILS 07/15/2020 3  Not Estab. % Final    BASOPHILS 07/15/2020 1  Not Estab. % Final    ABS. NEUTROPHILS 07/15/2020 3.0  1.4 - 7.0 x10E3/uL Final    Abs Lymphocytes 07/15/2020 2.5  0.7 - 3.1 x10E3/uL Final    ABS. MONOCYTES 07/15/2020 0.5  0.1 - 0.9 x10E3/uL Final    ABS. EOSINOPHILS 07/15/2020 0.2  0.0 - 0.4 x10E3/uL Final    ABS. BASOPHILS 07/15/2020 0.0  0.0 - 0.2 x10E3/uL Final    IMMATURE GRANULOCYTES 07/15/2020 0  Not Estab. % Final    ABS. IMM.  GRANS. 07/15/2020 0.0  0.0 - 0.1 x10E3/uL Final    Glucose 07/15/2020 90  65 - 99 mg/dL Final    BUN 07/15/2020 11  8 - 27 mg/dL Final    Creatinine 07/15/2020 0.72  0.57 - 1.00 mg/dL Final    BUN/Creatinine ratio 07/15/2020 15  12 - 28 Final    Sodium 07/15/2020 142  134 - 144 mmol/L Final    Potassium 07/15/2020 4.3  3.5 - 5.2 mmol/L Final    Chloride 07/15/2020 104  96 - 106 mmol/L Final    CO2 07/15/2020 24  20 - 29 mmol/L Final    Calcium 07/15/2020 9.1  8.7 - 10.3 mg/dL Final    Protein, total 07/15/2020 6.8  6.0 - 8.5 g/dL Final    Albumin 07/15/2020 3.9  3.8 - 4.8 g/dL Final    GLOBULIN, TOTAL 07/15/2020 2.9  1.5 - 4.5 g/dL Final    A-G Ratio 07/15/2020 1.3  1.2 - 2.2 Final    Bilirubin, total 07/15/2020 0.3  0.0 - 1.2 mg/dL Final    Alk. phosphatase 07/15/2020 77  39 - 117 IU/L Final    AST (SGOT) 07/15/2020 13  0 - 40 IU/L Final    ALT (SGPT) 07/15/2020 13  0 - 32 IU/L Final    Cholesterol, total 07/15/2020 159  100 - 199 mg/dL Final    Triglyceride 07/15/2020 47  0 - 149 mg/dL Final    HDL Cholesterol 07/15/2020 56  >39 mg/dL Final    VLDL, calculated 07/15/2020 9  5 - 40 mg/dL Final    LDL, calculated 07/15/2020 94  0 - 99 mg/dL Final    Creatine Kinase,Total 07/15/2020 60  32 - 182 U/L Final                  **Please note reference interval change**    Hemoglobin A1c 07/15/2020 6.1* 4.8 - 5.6 % Final    Comment:          Prediabetes: 5.7 - 6.4           Diabetes: >6.4           Glycemic control for adults with diabetes: <7.0      Estimated average glucose 07/15/2020 128  mg/dL Final    VARICELLA ZOSTER IGG 07/15/2020 1,789  Immune >165 index Final    Comment:                                Negative          <135                                 Equivocal    135 - 165                                 Positive          >165  A positive result generally indicates exposure to the  pathogen or administration of specific immunoglobulins,  but it is not indication of active infection or stage  of disease.

## 2020-11-19 NOTE — TELEPHONE ENCOUNTER
----- Message from Arlen Grover sent at 11/19/2020 10:09 AM EST -----  Regarding: Dr Castillo/telephone  Pt is calling to get a refill on her Atenolol 50 mg to [facility], number on file, because her mail order doesn't have it in stock right now, pt can be reach at [phone] if you have any questions, please call back today

## 2020-11-20 ENCOUNTER — TRANSCRIBE ORDER (OUTPATIENT)
Dept: INTERNAL MEDICINE CLINIC | Age: 70
End: 2020-11-20

## 2020-11-20 NOTE — TELEPHONE ENCOUNTER
Best contact number(s): [phone]       Name of medication and dosage if known: AtenoloL  50 mg       Is patient out of this medication (yes/no): Yes         Pharmacy name: [address] [place] [address] listed in chart? (yes/no): Yes   Pharmacy phone number:         Details to clarify the request: Out of stock through mail order. This is a follow up request from two days ago.  Please contact patient to advise rx has been sent

## 2020-11-23 NOTE — TELEPHONE ENCOUNTER
----- Message from Stan Fernandez sent at 11/21/2020  2:07 PM EST -----  Regarding: Dr. Brittani Sierra  Medication Refill    Caller (if not patient):  Katie Mchugh      Relationship of caller (if not patient):  Self      Best contact number(s):  [phone]      Name of medication and dosage if known: Atenolol 50 mg      Is patient out of this medication (yes/no): Only enough until 11/23/20      Pharmacy name:  [facility] listed in chart? (yes/no):  Pharmacy phone number:  [phone]      Details to clarify the request:  Pt states this her third message and call for the week. The mail order pharmacy she normally use states the Atenolol is out of stock until 1/30/2021, so she is requesting for a refill from the [place] [id number] for her medication or if a substitute medication can be prescribed. Pt is upset no one have returned her call since Wednesday 11/18/20. Please contact the Pt.     Thanks,  Stan Fernandez

## 2020-11-24 RX ORDER — ATENOLOL 50 MG/1
TABLET ORAL
Qty: 90 TAB | Refills: 0 | Status: SHIPPED | OUTPATIENT
Start: 2020-11-24 | End: 2021-02-09 | Stop reason: SDUPTHER

## 2020-11-25 NOTE — TELEPHONE ENCOUNTER
Refill request(s) approved--atenolol--to local pharmacy as requested. Refill protocol details (computer-generated) reviewed, as available.

## 2020-12-14 ENCOUNTER — OFFICE VISIT (OUTPATIENT)
Dept: SURGERY | Age: 70
End: 2020-12-14
Payer: MEDICARE

## 2020-12-14 VITALS
WEIGHT: 159 LBS | DIASTOLIC BLOOD PRESSURE: 52 MMHG | TEMPERATURE: 97.5 F | BODY MASS INDEX: 29.26 KG/M2 | HEART RATE: 64 BPM | SYSTOLIC BLOOD PRESSURE: 134 MMHG | HEIGHT: 62 IN

## 2020-12-14 DIAGNOSIS — Z91.89 AT HIGH RISK FOR BREAST CANCER: Primary | ICD-10-CM

## 2020-12-14 DIAGNOSIS — Z85.3 HISTORY OF BREAST CANCER: ICD-10-CM

## 2020-12-14 PROCEDURE — G8536 NO DOC ELDER MAL SCRN: HCPCS | Performed by: SURGERY

## 2020-12-14 PROCEDURE — 99213 OFFICE O/P EST LOW 20 MIN: CPT | Performed by: SURGERY

## 2020-12-14 PROCEDURE — G8752 SYS BP LESS 140: HCPCS | Performed by: SURGERY

## 2020-12-14 PROCEDURE — G8754 DIAS BP LESS 90: HCPCS | Performed by: SURGERY

## 2020-12-14 PROCEDURE — G8427 DOCREV CUR MEDS BY ELIG CLIN: HCPCS | Performed by: SURGERY

## 2020-12-14 PROCEDURE — G8419 CALC BMI OUT NRM PARAM NOF/U: HCPCS | Performed by: SURGERY

## 2020-12-14 PROCEDURE — 1101F PT FALLS ASSESS-DOCD LE1/YR: CPT | Performed by: SURGERY

## 2020-12-14 PROCEDURE — 3017F COLORECTAL CA SCREEN DOC REV: CPT | Performed by: SURGERY

## 2020-12-14 PROCEDURE — 1090F PRES/ABSN URINE INCON ASSESS: CPT | Performed by: SURGERY

## 2020-12-14 PROCEDURE — G9899 SCRN MAM PERF RSLTS DOC: HCPCS | Performed by: SURGERY

## 2020-12-14 PROCEDURE — G8432 DEP SCR NOT DOC, RNG: HCPCS | Performed by: SURGERY

## 2020-12-14 PROCEDURE — G8399 PT W/DXA RESULTS DOCUMENT: HCPCS | Performed by: SURGERY

## 2020-12-14 NOTE — PATIENT INSTRUCTIONS

## 2020-12-14 NOTE — LETTER
12/15/20 Patient: Daniel Bray YOB: 1950 Date of Visit: 12/14/2020 Renny Alvarez MD 
[address] [address] VIA In Basket Galdino Alfaro, [address] [facility] [place] [address] [facility] 7 [phone] VIA In Basket Dear MD Galdino Ward DO, Thank you for referring Ms. Janes Hoskins to [address] [facility] [place] MAIN OFFICE [address] [place] [address] for evaluation. My notes for this consultation are attached. If you have questions, please do not hesitate to call me. I look forward to following your patient along with you. Sincerely, Tg Damico MD

## 2020-12-14 NOTE — PROGRESS NOTES
HISTORY OF PRESENT ILLNESS  Barrie Hammond is a 72 y.o. female. HPI NEW patient here to re-establish follow up appointments with our office for history of RIGHT breast cancer. Continues to have occasional pain around RIGHT mastectomy with certain movement.      History of breast cancer-  RIGHT breast stage 2 T2N1a ER- VT-Her2 amplified, surgical path her2 neg mammoprint triple neg  6/2012- RIGHT mastectomy with no reconstruction by Dr. Kwame Wu. Completed TC x 6 treatments. Followed by Dr. Neetu Jo. 2/2013- completed radiation.      Breast imaging-  [facility] Results (most recent):  Results from Hospital Encounter encounter on 08/12/20   [facility] 3D MARIO W MAMMO LT SCREENING INCL CAD    Narrative STUDY: Left unilateral digital screening mammogram with 3-D tomosynthesis    INDICATION:  Screening. COMPARISON: Most recent, 8/9/2019    BREAST COMPOSITION: There are scattered areas of fibroglandular density. FINDINGS: Left unilateral digital screening mammography was performed and is  interpreted in conjunction with a computer assisted detection (CAD) system. Additionally, tomosynthesis of the left breast in the CC and MLO projections was  performed. No suspicious masses or calcifications are identified. There has been  no significant change. Impression IMPRESSION:  BI-RADS 1: Negative. No mammographic evidence of malignancy. RECOMMENDATIONS:  Next screening mammogram is recommended in one year. The patient will be notified of these results. Review of Systems   Constitutional: Negative. HENT: Negative. Eyes: Negative. Respiratory: Negative. Cardiovascular: Negative. Gastrointestinal: Positive for heartburn. Genitourinary: Negative. Musculoskeletal: Negative. Skin: Negative. Neurological: Negative. Endo/Heme/Allergies: Negative. Psychiatric/Behavioral: Negative. All other systems reviewed and are negative. Physical Exam  Vitals signs and nursing note reviewed.    Chest: Breasts:         Left: No swelling, bleeding, inverted nipple, mass, nipple discharge, skin change or tenderness. Lymphadenopathy:      Upper Body:      Right upper body: No supraclavicular, axillary or pectoral adenopathy. Left upper body: No supraclavicular, axillary or pectoral adenopathy. ASSESSMENT and PLAN    ICD-10-CM ICD-9-CM    1. At high risk for breast cancer  Z91.89 V49.89 MRI BREAST BI W WO CONT   2. History of breast cancer  Z85.3 V10.3 MRI BREAST BI W WO CONT     - no evidence local recurrence. - continue screening with mammo and mri  - concerned about distance disease. Will defer to dr. Tasneem Bartlett to order blood work.

## 2021-01-26 ENCOUNTER — HOSPITAL ENCOUNTER (OUTPATIENT)
Dept: MRI IMAGING | Age: 71
Discharge: HOME | End: 2021-01-26
Attending: SURGERY
Payer: MEDICARE

## 2021-01-26 VITALS — WEIGHT: 152 LBS | BODY MASS INDEX: 27.8 KG/M2

## 2021-01-26 DIAGNOSIS — Z85.3 HISTORY OF BREAST CANCER: ICD-10-CM

## 2021-01-26 DIAGNOSIS — Z91.89 AT HIGH RISK FOR BREAST CANCER: ICD-10-CM

## 2021-01-26 PROCEDURE — C8908 MRI W/O FOL W/CONT, BREAST,: HCPCS

## 2021-01-26 PROCEDURE — 77049 MRI BREAST C-+ W/CAD BI: CPT

## 2021-01-26 PROCEDURE — 74011250636 HC RX REV CODE- 250/636: Performed by: SURGERY

## 2021-01-26 PROCEDURE — A9585 GADOBUTROL INJECTION: HCPCS | Performed by: SURGERY

## 2021-01-26 RX ADMIN — GADOBUTROL 7 ML: 604.72 INJECTION INTRAVENOUS at 11:01

## 2021-01-27 ENCOUNTER — PATIENT MESSAGE (OUTPATIENT)
Dept: SURGERY | Age: 71
End: 2021-01-27

## 2021-01-29 ENCOUNTER — TELEPHONE (OUTPATIENT)
Dept: SURGERY | Age: 71
End: 2021-01-29

## 2021-01-29 NOTE — TELEPHONE ENCOUNTER
Called. No answer. Called back and spoke to patient. Had seen [employer] message and results.

## 2021-02-08 DIAGNOSIS — I10 ESSENTIAL HYPERTENSION: ICD-10-CM

## 2021-02-08 DIAGNOSIS — I25.10 CORONARY ARTERY DISEASE INVOLVING NATIVE CORONARY ARTERY OF NATIVE HEART WITHOUT ANGINA PECTORIS: ICD-10-CM

## 2021-02-08 NOTE — TELEPHONE ENCOUNTER
Patient need a refill on Tenormin 50 mg and Norvasc 5 mg and would like them sent   [address] [facility] number [phone]  Fax number [phone] ([employer] ID number)    Secondary    [facility] at [facility] [employer] and [address] [place] [address]

## 2021-02-09 NOTE — TELEPHONE ENCOUNTER
Last visit 11/12/2020 MD Steven Shahid   Next appointment 02/19/2021 MD Steven Shahid   Previous refill encounter(s)   11/24/2020 Tenormin #90,  02/15/2020 Norvasc #90 with 3 refills     Requested Prescriptions     Pending Prescriptions Disp Refills    amLODIPine (NORVASC) 5 mg tablet 90 Tab 1     Sig: Take 1 Tab by mouth daily.  atenoloL (TENORMIN) 50 mg tablet 90 Tab 1     Sig: Take 1 Tab by mouth daily.

## 2021-02-10 NOTE — TELEPHONE ENCOUNTER
Patient requesting medication as possible because patient is concerned for delay of mil order.  Patient would be calling tomorrow

## 2021-02-11 NOTE — TELEPHONE ENCOUNTER
Elizabeth Graves is requesting new prescriptions on behalf of the patient. Previous medications was sent to [address] Please review. Last visit 11/12/2020 MD John Joseph   Next appointment 02/19/2021 MD John Joseph   Previous refill encounter(s)   02/15/2020 Norvasc #90 with 3 refills,   08/07/2020 Lotensin #90 with 3 refills,   08/12/2020 Pravachol #90 with 3 refills,   11/24/2020 Tenormin #90. Requested Prescriptions     Pending Prescriptions Disp Refills    amLODIPine (NORVASC) 5 mg tablet 90 Tab 1     Sig: Take 1 Tab by mouth daily.  atenoloL (TENORMIN) 50 mg tablet 90 Tab 1     Sig: Take 1 Tab by mouth daily.  pravastatin (PRAVACHOL) 80 mg tablet 90 Tab 1     Sig: Take 1 Tab by mouth nightly.  benazepriL (LOTENSIN) 20 mg tablet 90 Tab 1     Sig: Take 1 Tab by mouth daily.

## 2021-02-13 RX ORDER — ATENOLOL 50 MG/1
50 TABLET ORAL DAILY
Qty: 90 TAB | Refills: 3 | Status: SHIPPED | OUTPATIENT
Start: 2021-02-13

## 2021-02-13 RX ORDER — BENAZEPRIL HYDROCHLORIDE 20 MG/1
20 TABLET ORAL DAILY
Qty: 90 TAB | Refills: 3 | Status: SHIPPED | OUTPATIENT
Start: 2021-02-13

## 2021-02-13 RX ORDER — AMLODIPINE BESYLATE 5 MG/1
5 TABLET ORAL DAILY
Qty: 90 TAB | Refills: 3 | Status: SHIPPED | OUTPATIENT
Start: 2021-02-13

## 2021-02-13 RX ORDER — PRAVASTATIN SODIUM 80 MG/1
80 TABLET ORAL
Qty: 90 TAB | Refills: 3 | Status: SHIPPED | OUTPATIENT
Start: 2021-02-13

## 2021-02-13 NOTE — TELEPHONE ENCOUNTER
Refill request(s) approved--amlodipine, atenolol, benazepril, pravastatin--90-day supply with 3 refill(s)--as requested. Refill protocol details (computer-generated) reviewed, as available.

## 2021-07-21 ENCOUNTER — TRANSCRIBE ORDER (OUTPATIENT)
Dept: SCHEDULING | Age: 71
End: 2021-07-21

## 2021-07-21 DIAGNOSIS — Z12.31 SCREENING MAMMOGRAM FOR HIGH-RISK PATIENT: Primary | ICD-10-CM

## 2021-09-28 ENCOUNTER — HOSPITAL ENCOUNTER (OUTPATIENT)
Dept: MAMMOGRAPHY | Age: 71
Discharge: HOME | End: 2021-09-28
Attending: FAMILY MEDICINE
Payer: MEDICARE

## 2021-09-28 DIAGNOSIS — Z12.31 SCREENING MAMMOGRAM FOR HIGH-RISK PATIENT: ICD-10-CM

## 2021-09-28 PROCEDURE — 77063 BREAST TOMOSYNTHESIS BI: CPT

## 2021-10-06 ENCOUNTER — OFFICE VISIT (OUTPATIENT)
Dept: ONCOLOGY | Age: 71
End: 2021-10-06
Payer: MEDICARE

## 2021-10-06 VITALS
BODY MASS INDEX: 29.28 KG/M2 | HEIGHT: 62 IN | TEMPERATURE: 97.1 F | WEIGHT: 159.1 LBS | OXYGEN SATURATION: 97 % | DIASTOLIC BLOOD PRESSURE: 69 MMHG | SYSTOLIC BLOOD PRESSURE: 154 MMHG | HEART RATE: 66 BPM

## 2021-10-06 DIAGNOSIS — Z85.3 HISTORY OF BREAST CANCER: Primary | ICD-10-CM

## 2021-10-06 DIAGNOSIS — E55.9 VITAMIN D DEFICIENCY: ICD-10-CM

## 2021-10-06 DIAGNOSIS — I10 ESSENTIAL HYPERTENSION: ICD-10-CM

## 2021-10-06 DIAGNOSIS — Z90.11 H/O MASTECTOMY, RIGHT: ICD-10-CM

## 2021-10-06 PROCEDURE — G8419 CALC BMI OUT NRM PARAM NOF/U: HCPCS | Performed by: INTERNAL MEDICINE

## 2021-10-06 PROCEDURE — 1090F PRES/ABSN URINE INCON ASSESS: CPT | Performed by: INTERNAL MEDICINE

## 2021-10-06 PROCEDURE — 1101F PT FALLS ASSESS-DOCD LE1/YR: CPT | Performed by: INTERNAL MEDICINE

## 2021-10-06 PROCEDURE — G8536 NO DOC ELDER MAL SCRN: HCPCS | Performed by: INTERNAL MEDICINE

## 2021-10-06 PROCEDURE — 99213 OFFICE O/P EST LOW 20 MIN: CPT | Performed by: INTERNAL MEDICINE

## 2021-10-06 PROCEDURE — G8427 DOCREV CUR MEDS BY ELIG CLIN: HCPCS | Performed by: INTERNAL MEDICINE

## 2021-10-06 PROCEDURE — G8399 PT W/DXA RESULTS DOCUMENT: HCPCS | Performed by: INTERNAL MEDICINE

## 2021-10-06 PROCEDURE — G8754 DIAS BP LESS 90: HCPCS | Performed by: INTERNAL MEDICINE

## 2021-10-06 PROCEDURE — G8510 SCR DEP NEG, NO PLAN REQD: HCPCS | Performed by: INTERNAL MEDICINE

## 2021-10-06 PROCEDURE — G8753 SYS BP > OR = 140: HCPCS | Performed by: INTERNAL MEDICINE

## 2021-10-06 PROCEDURE — G9899 SCRN MAM PERF RSLTS DOC: HCPCS | Performed by: INTERNAL MEDICINE

## 2021-10-06 PROCEDURE — 3017F COLORECTAL CA SCREEN DOC REV: CPT | Performed by: INTERNAL MEDICINE

## 2021-10-06 NOTE — PROGRESS NOTES
[facility] at [address], [id number] [place] [address] Road, [place]: [phone]  F: [phone]    Ulysses Orta is a 80 y.o. female seen today in office for follow up of Right Breast Cancer. CC: Breast Cancer Dx 5/12    TREATMENT COURSE:  · Right mastectomy 6/14/12  · TC x6 (8/27/12-12/10/12)  · XRT done 2/13    STAGE:  2  T2N1a, ER- DE-Her2 amplified, surgical path her2 neg mammoprint triple neg    HPI: Ulysses Orta is a 80 y.o. female seen today in office for 12 month follow up of right triplle neg breast cancer, hx of RIGHT mastectomy, not on any therapy. She reports that she feels well overall today. Her appetite and energy levels are good. She denies fever, chills, mouth sores, cough, SOB, CP, nausea, vomiting, diarrhea, and constipation. She denies pain today. She has routine HM and labs with her PCP. She had a Breast MRI on 1/26/21 that was negative/good. She had a left mammogram on 9/28/21 that was negative/good. She is fully vaccinated for COVID-19. She is here alone today. DX:   Encounter Diagnoses   Name Primary?     History of breast cancer Yes    H/O mastectomy, right     Essential hypertension     Vitamin D deficiency       Past Medical History:   Diagnosis Date    Arthritis     toe, back    Asthma     as a teenager    Breast cancer ([facility] [address]     Right Breast @ age 62 (2012)    Cancer [facility])      right breast cancer mastectomy    GERD (gastroesophageal reflux disease)     Hypertension     Osteoarthritis     S/P radiotherapy     Right Breast 2012    Thyroid disease     did take meds but now off     Past Surgical History:   Procedure Laterality Date    HX BREAST BIOPSY  5/1/12    RIGHT breast biopsy - POSITIVE    HX BREAST BIOPSY  5/22/12    LEFT breast biopsy - BENIGN    HX MASTECTOMY Right     June 2012 w/ Chemo & Radiation    HX ORTHOPAEDIC      4th toe bilaterally    HX PARTIAL HYSTERECTOMY  1983    prolapsed uterus    HX VASCULAR ACCESS      portacath    DC BREAST SURGERY PROCEDURE UNLISTED  6/14/12    RIGHT BREAST MASTECTOMY WITH RIGHT SENTINEL NODE BIOPSY, PORT A CATH INSERTION WITH ULTRASOUND; INFUSAPORT INSERTION; SENTINEL NODE BIOPSY.  DC CARDIAC SURG PROCEDURE UNLIST      attempted PTCA    DC CHEST SURGERY PROCEDURE UNLISTED Left 2012    Port Placement     Social History     Socioeconomic History    Marital status: SINGLE     Spouse name: Not on file    Number of children: Not on file    Years of education: Not on file    Highest education level: Not on file   Tobacco Use    Smoking status: Never Smoker    Smokeless tobacco: Never Used   Substance and Sexual Activity    Alcohol use: No     Alcohol/week: 0.0 standard drinks    Drug use: No     Types: Prescription, OTC    Sexual activity: Not Currently     Partners: Male     Social Determinants of Health     Financial Resource Strain:     Difficulty of Paying Living Expenses:    Food Insecurity:     Worried About Running Out of Food in the Last Year:     Ran Out of Food in the Last Year:    Transportation Needs:     Lack of Transportation (Medical):      Lack of Transportation (Non-Medical):    Physical Activity:     Days of Exercise per Week:     Minutes of Exercise per Session:    Stress:     Feeling of Stress :    Social Connections:     Frequency of Communication with Friends and Family:     Frequency of Social Gatherings with Friends and Family:     Attends Anabaptism Services:     Active Member of Clubs or Organizations:     Attends Club or Organization Meetings:     Marital Status:      Family History   Problem Relation Age of Onset    Diabetes Mother     Cancer Father         prostate    Cancer Maternal Aunt         breast cancer; mastectomy, now age 76    Breast Cancer Maternal Aunt         42's    Breast Cancer Maternal Aunt         60's     Current Outpatient Medications   Medication Sig Dispense Refill    amLODIPine (NORVASC) 5 mg tablet Take 1 Tab by mouth daily. 90 Tab 3    atenoloL (TENORMIN) 50 mg tablet Take 1 Tab by mouth daily. 90 Tab 3    pravastatin (PRAVACHOL) 80 mg tablet Take 1 Tab by mouth nightly. 90 Tab 3    benazepriL (LOTENSIN) 20 mg tablet Take 1 Tab by mouth daily. 90 Tab 3    MASTECTOMY PROSTHESIS misc Bra and prosthesis / breast cancer hx mastectomy 4 Each 1    OTHER Dispense blood pressure monitoring kit to pt. Please ensure non-wrist cuff device, with appropriately sized cuff to allow for accurate home BP monitoring. 1 Each 0    albuterol (PROVENTIL HFA, VENTOLIN HFA, PROAIR HFA) 90 mcg/actuation inhaler Take 2 Puffs by inhalation every four (4) hours as needed for Wheezing. Indications: bronchospasm prevention 1 Inhaler 3    ascorbic acid, vitamin C, (VITAMIN C) 500 mg tablet Take 1,000 mg by mouth daily.  COD LIVER OIL PO Take 1 Cap by mouth daily.  triamcinolone (NASACORT AQ) 55 mcg nasal inhaler 2 Sprays daily.  glucosamine-chondroitin (ARTHX) 500-400 mg cap Take 1 Cap by mouth daily.  naproxen sodium 220 mg cap Take 3 Tabs by mouth as needed.  cyanocobalamin 1,000 mcg tablet Take 2,000 mcg by mouth daily.  lansoprazole (PREVACID) 15 mg capsule Take 15 mg by mouth as needed.  cetirizine (ZYRTEC) 10 mg tablet Take 10 mg by mouth daily.  cholecalciferol, vitamin D3, 2,000 unit tab Take 2,000 Units by mouth.  aspirin (ASPIRIN) 325 mg tablet Take 1 Tab by mouth daily. 1 Tab 0     Allergies   Allergen Reactions    Penicillins Hives    Bactrim [Sulfamethoprim Ds] Rash    Ciprofloxacin (Bulk) Rash     Review of Systems  A complete review of systems was obtained, negative except as described above and as reported on ROS sheet scanned into system.      Objective:  Visit Vitals  BP (!) 154/69 (BP 1 Location: Left upper arm, BP Patient Position: Sitting)   Pulse 66   Temp 97.1 °F (36.2 °C) (Temporal)   Ht 5' 2\" (1.575 m)   Wt 159 lb 1.6 oz (72.2 kg)   SpO2 97%   BMI 29.10 kg/m² Physical Exam:   General appearance - Alert, well appearing, and in no distress, oriented to person, place, and time and normal appearing weight  Mental status - Alert, oriented to person, place, and time, normal mood, behavior, speech, dress, motor activity, and thought processes. HEENT - Conj clear, wearing a mask  Neck - Supple  CV - Regular  Resp - CTA bilat, no wheezes, rales or rhonchi. GI - Soft, nontender  Ext - No edema noted, skin warm and dry  Neuro - Alert, oriented, normal speech, no focal findings   Breast - Right mast with tight scars/no masses or chest wall masses, left breast with no palpable masses   Neuro - Non focal     Diagnostic Imaging Reviewed    [facility] Results (most recent):  Results from Hospital Encounter encounter on 09/28/21    [facility] 3D MARIO W MAMMO LT SCREENING INCL CAD    Narrative  STUDY: Left unilateral digital screening mammogram with 3-D tomosynthesis    INDICATION:  Screening. COMPARISON: Prior studies dating back to 2012    BREAST COMPOSITION: There are scattered areas of fibroglandular density. FINDINGS: Left unilateral digital screening mammography was performed and is  interpreted in conjunction with a computer assisted detection (CAD) system. Additionally, tomosynthesis of the left breast in the CC and MLO projections was  performed. No suspicious masses or calcifications are identified. There has been  no significant change. Impression  BI-RADS 1: Negative. No mammographic evidence of malignancy. RECOMMENDATIONS:  Next screening mammogram is recommended in one year. The patient will be notified of these results. MRI Results (most recent):  Results from [place] encounter on 01/26/21    MRI BREAST BI W WO CONT    Narrative  EXAM:  MRI BREAST BI W WO CONT    INDICATION: High-risk screening. Right mastectomy. COMPARISON: MRI breast on 11/25/2014. Left mammography dating back to 5/15/2017.     EXAM: MRI of right and left breast without and with IV contrast Gadavist .    TECHNIQUE: MRI breast without and with IV contrast. Bilateral breast MRI was  performed using a dedicated breast coil without compression with the patient in  the prone position. Precontrast T1-weighted images with fat suppression were  obtained followed by bolus injection of 7 mL of Gadavist . Postcontrast dynamic  and high-resolution images were acquired. Axial inversion recovery imaging was  also performed. The images were analyzed using CAD analysis, enhancement curves,  digital subtraction, and 2 and 3 dimensional reconstructions. FINDINGS:    Background parenchymal enhancement: Mild. Lymph nodes: No lymphadenopathy. Left  axillary lymph nodes are unchanged. Right mastectomy. No right chest wall mass. Left breast: Biopsy clip is unchanged. Curvilinear enhancement in the middle  third at 12:00 is unchanged and corresponds to a mammographic finding. No  suspicious morphology or enhancement in the left breast. Normal left skin and  nipple. Impression  1. No MRI evidence of malignancy in the left breast.  2. Right mastectomy. Normal right chest wall. Assessment: ACR BI-RADS category  BI-RADS Assessment Category 2: Benign finding. Recommendation: Screening left mammogram in August, 2021. A negative breast MRI examination speaks strongly against invasive cancer down  to a detection threshold of 3 to 5 mm but may not detect some lower grade or in  situ carcinomas. Therefore, routine clinical and mammographic followup are  recommended. A summary portfolio has been created in PACS. DEXA Results (most recent):  Results from Hospital Encounter encounter on 09/20/17    DEXA BONE DENSITY STUDY AXIAL    Narrative  Bone Mineral Density    Indication: Osteoporosis screening  Age: 73  Sex: Female. Menopause status: postmenopausal.  Hormone replacement therapy: No    Risk factors for fall:   None. Risk factors for osteoporosis:  None.     Current medication for osteoporosis: Calcium and vitamin D. Comparison: None. Technique: Imaging was performed on the [employer]. Excluded sites: Lumbar spine due to degenerative changes    Findings: Total hip: Right  Bone mineral density (gm/cm2):  1.156  % of peak bone mass: 115  % for age matched controls:  80  T score: 1.2  Z score:  1.2    Femoral neck: Left  Bone mineral density (gm/cm2):  1.048  % of peak bone mass: 101  % for age matched controls:  80  T score: 0.1  Z score:  0.5    Forearm: Right  Bone mineral density (gm/cm2):  0.876  % of peak bone mass: 99  % for age matched controls:  80  T score: -0.1  Z score:  0.6    Impression  Impression: This patient is normal using the World Health Organization criteria    Lab Results  Per PCP    Lab Results   Component Value Date/Time    WBC 6.3 07/15/2020 08:50 AM     Lab Results   Component Value Date/Time    Sodium 142 07/15/2020 08:50 AM     Assessment/Plan:    1. Stage 2 Triple Negative RIght Breast Cancer Hx of Right Mastectomy/Chest Wall XRT  Patient seen today in office for 12 month follow up. She is clinically stable today and doing well overall. She had a Breast MRI on 1/26/21 that was negative/good. She had a left mammogram on 9/28/21 that was negative/good. She has routine HM and labs with her PCP. Continue course of surveillance. Follow up in 12 months. Patient agrees with plan. 2.  HTN/Vitamin D Deficiency  On Vitamin D, management per PCP. 3. Psychosocial  Mood good, she has good family support. SW/NN support as needed. She is here alone today. Call if questions. Follow up in 12 months. This patient was seen in conjunction with Luz Maria Boswell NP. I personally performed a face to face diagnostic evaluation on this patient. I personally reviewed the history and performed the key points on the exam.   I personally reviewed all points in the assessment and created treatment plan with the patient.   Specifically, pt seen by me today  Doing well overall  Not on any therapy from here  mammo up to date  Continue course of surveillance. Follow-up and Dispositions    · Return in about 1 year (around 10/6/2022).        Ruth Diaz, DO

## 2021-10-06 NOTE — PROGRESS NOTES
Ana Maria Beverly is a 80 y.o. female  Chief Complaint   Patient presents with    Follow-up    Breast Cancer     1. Have you been to the ER, urgent care clinic since your last visit? Hospitalized since your last visit? No.  2. Have you seen or consulted any other health care providers outside of the [address] [place] [address] since your last visit? Include any pap smears or colon screening. Yes, patient to see Dr.Stacy Roxanne Abdi her new PCP.

## 2021-10-13 ENCOUNTER — TELEPHONE (OUTPATIENT)
Dept: ONCOLOGY | Age: 71
End: 2021-10-13

## 2021-10-13 DIAGNOSIS — Z85.3 HISTORY OF BREAST CANCER: ICD-10-CM

## 2021-10-13 DIAGNOSIS — Z90.11 H/O MASTECTOMY, RIGHT: ICD-10-CM

## 2021-10-13 NOTE — TELEPHONE ENCOUNTER
HERNANDEZ Verified. Called pt on mobile phone number listed. Patient was made aware Mastectomy bra/prosthesis script has been faxed to [facility] to fax number: [phone], fax confirmation was received back! This was done. Patient was very appreciative over call.   Rigoberto Tony

## 2021-10-13 NOTE — TELEPHONE ENCOUNTER
Mastectomy bra/prosthesis ordered. Please fax to [facility] and let patient know once complete.

## 2022-02-10 NOTE — TELEPHONE ENCOUNTER
Patient called stating she needs a script for masectomy prosthesis and bras faxed to [address] [facility].     Fax # [phone]    CB# [phone] Power of  letter with blank power of  mailed to home address.

## 2022-03-18 PROBLEM — E55.9 VITAMIN D DEFICIENCY: Status: ACTIVE | Noted: 2018-10-17

## 2022-03-19 PROBLEM — M19.90 ARTHRITIS: Status: ACTIVE | Noted: 2018-07-12

## 2022-03-19 PROBLEM — H43.819 VITREOUS DEGENERATION: Status: ACTIVE | Noted: 2018-06-25

## 2022-03-19 PROBLEM — M54.2 NECK PAIN: Status: ACTIVE | Noted: 2018-07-12

## 2022-03-19 PROBLEM — M47.22 OSTEOARTHRITIS OF SPINE WITH RADICULOPATHY, CERVICAL REGION: Status: ACTIVE | Noted: 2018-03-20

## 2022-03-19 PROBLEM — Z92.89 H/O MAMMOGRAM: Status: ACTIVE | Noted: 2017-09-01

## 2022-03-19 PROBLEM — Z71.89 ACP (ADVANCE CARE PLANNING): Status: ACTIVE | Noted: 2017-03-07

## 2022-03-20 PROBLEM — R07.89 CHEST WALL DISCOMFORT: Status: ACTIVE | Noted: 2017-07-07

## 2022-03-20 PROBLEM — R73.03 PREDIABETES: Status: ACTIVE | Noted: 2018-06-22

## 2022-03-20 PROBLEM — Z90.11 H/O MASTECTOMY, RIGHT: Status: ACTIVE | Noted: 2018-07-12

## 2022-03-20 PROBLEM — M17.12 ARTHRITIS OF KNEE, LEFT: Status: ACTIVE | Noted: 2017-01-13

## 2022-03-20 PROBLEM — Z85.3 HISTORY OF BREAST CANCER: Status: ACTIVE | Noted: 2019-07-15

## 2022-03-30 ENCOUNTER — TRANSCRIBE ORDER (OUTPATIENT)
Dept: SCHEDULING | Age: 72
End: 2022-03-30

## 2022-03-30 DIAGNOSIS — I15.8 SECONDARY DIASTOLIC HYPERTENSION: ICD-10-CM

## 2022-03-30 DIAGNOSIS — I10 ESSENTIAL HYPERTENSION, MALIGNANT: Primary | ICD-10-CM

## 2022-06-13 NOTE — PATIENT INSTRUCTIONS
Dr. Inder Ybarra: The [address] [facility], [address], [address]   Map this location  Phone: [phone]    Call and schedule an appointment to reestablish care      1. Continue certirizine 31YJ daily    2. Start OTC daily steroid nasal spray (flonase or nasacort)    3. Continue albuterol inhalerl 2 puffs every 3-4 hours as needed.   Let me know if you need it more than 2x/day Subjective:      Patient ID: Mayur Lundberg is a 60 y.o. male.    Chief Complaint: Back Pain, Knee Pain, and Abdominal Pain      Pain  This is a chronic problem. The current episode started more than 1 year ago. The problem occurs daily. The problem has been waxing and waning. Associated symptoms include arthralgias and neck pain. Pertinent negatives include no change in bowel habit, chest pain, chills, coughing, diaphoresis, fever, rash, sore throat, vertigo or vomiting.     Review of Systems   Constitutional: Negative for activity change, appetite change, chills, diaphoresis, fever and unexpected weight change.   HENT: Negative for drooling, ear discharge, ear pain, facial swelling, nosebleeds, sore throat, trouble swallowing, voice change and goiter.    Eyes: Negative for photophobia, pain, discharge, redness and visual disturbance.   Respiratory: Negative for apnea, cough, choking, chest tightness, shortness of breath, wheezing and stridor.    Cardiovascular: Negative for chest pain, palpitations and leg swelling.   Gastrointestinal: Negative for abdominal distention, change in bowel habit, diarrhea, rectal pain, vomiting, fecal incontinence and change in bowel habit.   Endocrine: Negative for cold intolerance, heat intolerance, polydipsia, polyphagia and polyuria.   Genitourinary: Negative for bladder incontinence, dysuria, flank pain and frequency.   Musculoskeletal: Positive for arthralgias, back pain, gait problem and neck pain.   Integumentary:  Negative for color change, pallor and rash.   Neurological: Negative for dizziness, vertigo, seizures, syncope, facial asymmetry, speech difficulty, light-headedness, disturbances in coordination, memory loss and coordination difficulties.   Hematological: Negative for adenopathy. Does not bruise/bleed easily.   Psychiatric/Behavioral: Negative for agitation, behavioral problems, confusion, decreased concentration, dysphoric mood, hallucinations, self-injury and  "suicidal ideas. The patient is not nervous/anxious and is not hyperactive.             Objective:  Vitals:    06/14/22 1108 06/14/22 1109   BP: 114/71    Pulse: 83    Weight: 74.8 kg (165 lb)    Height: 6' 2" (1.88 m)    PainSc:   9   9         Physical Exam  Vitals and nursing note reviewed. Exam conducted with a chaperone present.   Constitutional:       General: He is awake. He is not in acute distress.     Appearance: Normal appearance. He is not ill-appearing, toxic-appearing or diaphoretic.   HENT:      Head: Normocephalic and atraumatic.      Nose: Nose normal.      Mouth/Throat:      Mouth: Mucous membranes are moist.      Pharynx: Oropharynx is clear.   Eyes:      Conjunctiva/sclera: Conjunctivae normal.      Pupils: Pupils are equal, round, and reactive to light.   Cardiovascular:      Rate and Rhythm: Normal rate.   Pulmonary:      Effort: Pulmonary effort is normal. No respiratory distress.   Chest:      Chest wall: Tenderness present.   Abdominal:      Palpations: Abdomen is soft.      Tenderness: There is no guarding.   Musculoskeletal:         General: Normal range of motion.      Cervical back: Normal range of motion and neck supple. Tenderness present. No rigidity.      Thoracic back: Tenderness present.      Lumbar back: Tenderness present.   Skin:     General: Skin is warm and dry.      Coloration: Skin is not jaundiced or pale.   Neurological:      General: No focal deficit present.      Mental Status: He is alert and oriented to person, place, and time. Mental status is at baseline.      Cranial Nerves: Cranial nerves are intact. No cranial nerve deficit (II-XII).      Gait: Gait abnormal.   Psychiatric:         Mood and Affect: Mood normal.         Behavior: Behavior normal. Behavior is cooperative.         Thought Content: Thought content normal.           MRI Cervical Spine W WO Cont  Narrative: MRI CERVICAL SPINE W/WO CONTRAST    Indication: Extremity weakness, previous surgery "     Technique: Axial and sagittal imaging of the spine is performed using  T1, T2 , STIR and T2 fat sat sequences without contrast. Post contrast  the T1-weighted sequences are performed after administration of 20 cc  Dotarem.    Comparison: 28 January 2019    Findings: Vertebral bodies are similar in height and alignment.   Anterior fusion at C4-C6 appear similar. There is been posterior  laminectomy and posterior fusion performed since previous study, appears  within normal limits.    There is cord signal hyperintensity with mild loss at the C3-4 level  especially on the left side. The signal abnormality was present in the  spinal cord on the previous study at this level.    C3-4: There is disc osteophyte complex with mild canal stenosis. There  is uncovertebral joint hypertrophy with mild bilateral foraminal  stenosis.    C5-C6: There is uncovertebral joint hypertrophy contributing to moderate  right foraminal stenosis.    C6-C7: There is disc osteophyte complex with mild central canal  stenosis. There is uncovertebral joint hypertrophy with moderate to  severe right and moderate left foraminal stenosis.    C7-T1: There is disc osteophyte complex with mild central canal  stenosis. There is uncovertebral joint and facet joint hypertrophy with  severe bilateral foraminal stenosis.    Remaining spinal cord signal appears within normal limits.      Osseous marrow signal appears within normal limits.     No abnormal enhancement is seen on the post-contrasted images when  compared to the precontrast study.  Impression: Impression: Multilevel cervical fusion and spondylitic changes as  described above. 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    This report has been electronically signed by Marlo Chaudhary  X-Ray Tibia Fibula 2 View Left  Narrative: CR SPINE CERVICAL AP AND LATERAL    Indication: Cervicalgia     Comparison: 15 Patsy 2014    Findings: No fracture is seen. Vertebral body heights and  alignment are  stable with fusion from C3-C6 unchanged from previous.    There is loss of disc space and degenerative change moderate at C6-C7  and mild to moderate at C2-3.  Impression: Impression: Surgical changes and spondylitic changes. Stable when  compared to previous.    This report has been electronically signed by Marlo Chaudhary       Office Visit on 02/23/2022   Component Date Value Ref Range Status    See Scanned Report 02/23/2022 See Scanned Result   Final           Assessment:      1. Malignant neoplasm of lower lobe, right bronchus or lung    2. Cancer related pain    3. Lumbar radiculopathy    4. Chronic pain of both knees    5. Encounter for long-term (current) use of other medications          Orders Placed This Encounter   Procedures    Drug Screen Definitive 14, Urine     Standing Status:   Future     Number of Occurrences:   1     Standing Expiration Date:   8/13/2023     Order Specific Question:   Specimen Source     Answer:   Urine    POCT Urine Drug Screen Presump     Interpretive Information:     Negative:  No drug detected at the cut off level.   Positive:  This result represents presumptive positive for the   tested drug, other substances may yield a positive response other   than the analyte of interest. This result should be utilized for   diagnostic purpose only. Confirmation testing will be performed upon physician request only.            A's of Opioid Risk Assessment  Activity:Patient can perform ADL.   Analgesia:Patients pain is partially controlled by current medication. Patient has tried OTC medications such as Tylenol and Ibuprofen with out relief.   Adverse Effects: Patient denies constipation or sedation.  Aberrant Behavior:  reviewed with no aberrant drug seeking/taking behavior.  Overdose reversal drug naloxone discussed    Drug screen reviewed      Requested Prescriptions     Signed Prescriptions Disp Refills    HYDROcodone-acetaminophen (NORCO)  mg per tablet  90 tablet 0     Sig: Take 1 tablet by mouth every 8 (eight) hours as needed for Pain.    HYDROcodone-acetaminophen (NORCO)  mg per tablet 90 tablet 0     Sig: Take 1 tablet by mouth every 8 (eight) hours as needed for Pain.         Plan:    Motorized scooter for mobility history stroke left-sided weakness    Rheumatoid arthritis    Lung cancer, follows oncology [facility], he now has further metastasis lung cancer left ribcage area increasing pain he is requesting adjustment pain medication    Presumptive drug screen today negative    Patient states she is taking medication as directed    Will order definitive drug screen for clarification    Increase [place] 10 1 p.o. q.8 hours    Continue activity as tolerated    Follow-up 2 months    Dr. Masterson, February 2023    Bring original prescription medication in bottles with labels to each visit

## 2022-08-31 ENCOUNTER — TRANSCRIBE ORDER (OUTPATIENT)
Dept: SCHEDULING | Age: 72
End: 2022-08-31

## 2022-08-31 DIAGNOSIS — Z12.31 SCREENING MAMMOGRAM FOR HIGH-RISK PATIENT: Primary | ICD-10-CM

## 2022-10-20 ENCOUNTER — HOSPITAL ENCOUNTER (OUTPATIENT)
Dept: MAMMOGRAPHY | Age: 72
Discharge: HOME | End: 2022-10-20
Attending: FAMILY MEDICINE
Payer: MEDICARE

## 2022-10-20 DIAGNOSIS — Z12.31 SCREENING MAMMOGRAM FOR HIGH-RISK PATIENT: ICD-10-CM

## 2022-10-20 PROCEDURE — 77063 BREAST TOMOSYNTHESIS BI: CPT
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
## 2022-10-21 ENCOUNTER — OFFICE VISIT (OUTPATIENT)
Dept: SURGERY | Age: 72
End: 2022-10-21
Payer: MEDICARE

## 2022-10-21 VITALS
HEIGHT: 62 IN | BODY MASS INDEX: 29.44 KG/M2 | WEIGHT: 160 LBS | DIASTOLIC BLOOD PRESSURE: 56 MMHG | HEART RATE: 78 BPM | SYSTOLIC BLOOD PRESSURE: 128 MMHG

## 2022-10-21 DIAGNOSIS — R07.89 CHEST WALL PAIN: ICD-10-CM

## 2022-10-21 DIAGNOSIS — Z90.11 HISTORY OF RIGHT MASTECTOMY: Primary | ICD-10-CM

## 2022-10-21 PROCEDURE — G8752 SYS BP LESS 140: HCPCS | Performed by: SURGERY

## 2022-10-21 PROCEDURE — G9899 SCRN MAM PERF RSLTS DOC: HCPCS | Performed by: SURGERY

## 2022-10-21 PROCEDURE — 76642 ULTRASOUND BREAST LIMITED: CPT | Performed by: SURGERY

## 2022-10-21 PROCEDURE — G8754 DIAS BP LESS 90: HCPCS | Performed by: SURGERY

## 2022-10-21 PROCEDURE — G8427 DOCREV CUR MEDS BY ELIG CLIN: HCPCS | Performed by: SURGERY

## 2022-10-21 PROCEDURE — 1090F PRES/ABSN URINE INCON ASSESS: CPT | Performed by: SURGERY

## 2022-10-21 PROCEDURE — 99214 OFFICE O/P EST MOD 30 MIN: CPT | Performed by: SURGERY

## 2022-10-21 PROCEDURE — G8536 NO DOC ELDER MAL SCRN: HCPCS | Performed by: SURGERY

## 2022-10-21 PROCEDURE — G8432 DEP SCR NOT DOC, RNG: HCPCS | Performed by: SURGERY

## 2022-10-21 PROCEDURE — 3017F COLORECTAL CA SCREEN DOC REV: CPT | Performed by: SURGERY

## 2022-10-21 PROCEDURE — G8399 PT W/DXA RESULTS DOCUMENT: HCPCS | Performed by: SURGERY

## 2022-10-21 PROCEDURE — G8419 CALC BMI OUT NRM PARAM NOF/U: HCPCS | Performed by: SURGERY

## 2022-10-21 PROCEDURE — 1101F PT FALLS ASSESS-DOCD LE1/YR: CPT | Performed by: SURGERY

## 2022-10-21 PROCEDURE — 1123F ACP DISCUSS/DSCN MKR DOCD: CPT | Performed by: SURGERY

## 2022-12-12 ENCOUNTER — TRANSCRIBE ORDER (OUTPATIENT)
Dept: SCHEDULING | Age: 72
End: 2022-12-12

## 2022-12-12 DIAGNOSIS — Z78.0 POST-MENOPAUSAL: Primary | ICD-10-CM

## 2022-12-14 ENCOUNTER — HOSPITAL ENCOUNTER (OUTPATIENT)
Dept: MAMMOGRAPHY | Age: 72
Discharge: HOME | End: 2022-12-14
Attending: FAMILY MEDICINE
Payer: MEDICARE

## 2022-12-14 DIAGNOSIS — Z78.0 POST-MENOPAUSAL: ICD-10-CM

## 2022-12-14 PROCEDURE — 77080 DXA BONE DENSITY AXIAL: CPT

## 2023-11-10 ENCOUNTER — HOSPITAL ENCOUNTER (OUTPATIENT)
Facility: HOSPITAL | Age: 73
End: 2023-11-10
Payer: MEDICARE

## 2023-11-10 VITALS — HEIGHT: 62 IN | WEIGHT: 160 LBS | BODY MASS INDEX: 29.44 KG/M2

## 2023-11-10 DIAGNOSIS — Z12.31 SCREENING MAMMOGRAM FOR HIGH-RISK PATIENT: ICD-10-CM

## 2023-11-10 PROCEDURE — 77063 BREAST TOMOSYNTHESIS BI: CPT

## 2024-08-09 ENCOUNTER — TRANSCRIBE ORDERS (OUTPATIENT)
Facility: HOSPITAL | Age: 74
End: 2024-08-09

## 2024-08-09 DIAGNOSIS — Z12.31 ENCOUNTER FOR SCREENING MAMMOGRAM FOR BREAST CANCER: Primary | ICD-10-CM

## 2024-11-12 ENCOUNTER — HOSPITAL ENCOUNTER (OUTPATIENT)
Facility: HOSPITAL | Age: 74
Discharge: HOME | End: 2024-11-15
Payer: MEDICARE

## 2024-11-12 DIAGNOSIS — Z12.31 ENCOUNTER FOR SCREENING MAMMOGRAM FOR BREAST CANCER: ICD-10-CM

## 2024-11-12 PROCEDURE — 77067 SCR MAMMO BI INCL CAD: CPT

## 2024-11-20 ENCOUNTER — TRANSCRIBE ORDERS (OUTPATIENT)
Facility: HOSPITAL | Age: 74
End: 2024-11-20

## 2024-11-20 DIAGNOSIS — I20.89 STABLE ANGINA (HCC): ICD-10-CM

## 2024-11-20 DIAGNOSIS — I25.118 ATHEROSCLEROSIS OF NATIVE CORONARY ARTERY OF NATIVE HEART WITH OTHER FORM OF ANGINA PECTORIS (HCC): Primary | ICD-10-CM

## 2024-11-22 ENCOUNTER — HOSPITAL ENCOUNTER (OUTPATIENT)
Facility: HOSPITAL | Age: 74
End: 2024-11-22
Payer: MEDICARE

## 2024-11-22 ENCOUNTER — HOSPITAL ENCOUNTER (OUTPATIENT)
Facility: HOSPITAL | Age: 74
Discharge: HOME | End: 2024-11-22
Payer: MEDICARE

## 2024-11-22 VITALS — BODY MASS INDEX: 27.42 KG/M2 | HEIGHT: 62 IN | WEIGHT: 149 LBS

## 2024-11-22 DIAGNOSIS — I25.118 ATHEROSCLEROSIS OF NATIVE CORONARY ARTERY OF NATIVE HEART WITH OTHER FORM OF ANGINA PECTORIS (HCC): ICD-10-CM

## 2024-11-22 DIAGNOSIS — I20.89 STABLE ANGINA (HCC): ICD-10-CM

## 2024-11-22 LAB
ECHO BSA: 1.72 M2
EKG DIAGNOSIS: NORMAL
STRESS BASELINE DIAS BP: 66 MMHG
STRESS BASELINE HR: 64 BPM
STRESS BASELINE SYS BP: 140 MMHG
STRESS ESTIMATED WORKLOAD: 1 METS
STRESS O2 SAT PEAK: 99 %
STRESS O2 SAT REST: 97 %
STRESS PEAK DIAS BP: 66 MMHG
STRESS PEAK SYS BP: 140 MMHG
STRESS PERCENT HR ACHIEVED: 60 %
STRESS POST PEAK HR: 88 BPM
STRESS RATE PRESSURE PRODUCT: NORMAL BPM*MMHG
STRESS TARGET HR: 147 BPM

## 2024-11-22 PROCEDURE — 3430000000 HC RX DIAGNOSTIC RADIOPHARMACEUTICAL: Performed by: STUDENT IN AN ORGANIZED HEALTH CARE EDUCATION/TRAINING PROGRAM

## 2024-11-22 PROCEDURE — 93017 CV STRESS TEST TRACING ONLY: CPT

## 2024-11-22 PROCEDURE — 78452 HT MUSCLE IMAGE SPECT MULT: CPT

## 2024-11-22 PROCEDURE — 6360000002 HC RX W HCPCS: Performed by: STUDENT IN AN ORGANIZED HEALTH CARE EDUCATION/TRAINING PROGRAM

## 2024-11-22 PROCEDURE — A9500 TC99M SESTAMIBI: HCPCS | Performed by: STUDENT IN AN ORGANIZED HEALTH CARE EDUCATION/TRAINING PROGRAM

## 2024-11-22 RX ORDER — METOPROLOL SUCCINATE 50 MG/1
50 TABLET, EXTENDED RELEASE ORAL DAILY
COMMUNITY

## 2024-11-22 RX ORDER — ISOSORBIDE MONONITRATE 30 MG/1
30 TABLET, EXTENDED RELEASE ORAL DAILY
COMMUNITY

## 2024-11-22 RX ORDER — TETRAKIS(2-METHOXYISOBUTYLISOCYANIDE)COPPER(I) TETRAFLUOROBORATE 1 MG/ML
11 INJECTION, POWDER, LYOPHILIZED, FOR SOLUTION INTRAVENOUS
Status: COMPLETED | OUTPATIENT
Start: 2024-11-22 | End: 2024-11-22

## 2024-11-22 RX ORDER — REGADENOSON 0.08 MG/ML
0.4 INJECTION, SOLUTION INTRAVENOUS
Status: COMPLETED | OUTPATIENT
Start: 2024-11-22 | End: 2024-11-22

## 2024-11-22 RX ORDER — TETRAKIS(2-METHOXYISOBUTYLISOCYANIDE)COPPER(I) TETRAFLUOROBORATE 1 MG/ML
32.3 INJECTION, POWDER, LYOPHILIZED, FOR SOLUTION INTRAVENOUS
Status: COMPLETED | OUTPATIENT
Start: 2024-11-22 | End: 2024-11-22

## 2024-11-22 RX ORDER — PANTOPRAZOLE SODIUM 40 MG/1
40 TABLET, DELAYED RELEASE ORAL DAILY
COMMUNITY

## 2024-11-22 RX ORDER — ATORVASTATIN CALCIUM 40 MG/1
40 TABLET, FILM COATED ORAL DAILY
COMMUNITY

## 2024-11-22 RX ADMIN — REGADENOSON 0.4 MG: 0.08 INJECTION, SOLUTION INTRAVENOUS at 09:20

## 2024-11-22 RX ADMIN — TETRAKIS(2-METHOXYISOBUTYLISOCYANIDE)COPPER(I) TETRAFLUOROBORATE 32.3 MILLICURIE: 1 INJECTION, POWDER, LYOPHILIZED, FOR SOLUTION INTRAVENOUS at 09:25

## 2024-11-22 RX ADMIN — TETRAKIS(2-METHOXYISOBUTYLISOCYANIDE)COPPER(I) TETRAFLUOROBORATE 11 MILLICURIE: 1 INJECTION, POWDER, LYOPHILIZED, FOR SOLUTION INTRAVENOUS at 07:50

## 2025-05-09 ENCOUNTER — HOSPITAL ENCOUNTER (OUTPATIENT)
Facility: HOSPITAL | Age: 75
Discharge: HOME | End: 2025-05-12
Payer: MEDICARE

## 2025-05-09 ENCOUNTER — TRANSCRIBE ORDERS (OUTPATIENT)
Facility: HOSPITAL | Age: 75
End: 2025-05-09

## 2025-05-09 DIAGNOSIS — M54.6 LEFT-SIDED THORACIC BACK PAIN, UNSPECIFIED CHRONICITY: Primary | ICD-10-CM

## 2025-05-09 DIAGNOSIS — R22.30: Primary | ICD-10-CM

## 2025-05-09 DIAGNOSIS — M54.6 LEFT-SIDED THORACIC BACK PAIN, UNSPECIFIED CHRONICITY: ICD-10-CM

## 2025-05-09 DIAGNOSIS — M79.18 LUMBAR MUSCLE PAIN: ICD-10-CM

## 2025-05-09 DIAGNOSIS — R22.30: ICD-10-CM

## 2025-05-09 PROCEDURE — 73130 X-RAY EXAM OF HAND: CPT

## 2025-05-09 PROCEDURE — 72100 X-RAY EXAM L-S SPINE 2/3 VWS: CPT

## 2025-05-09 PROCEDURE — 72072 X-RAY EXAM THORAC SPINE 3VWS: CPT

## 2025-06-30 ENCOUNTER — TRANSCRIBE ORDERS (OUTPATIENT)
Facility: HOSPITAL | Age: 75
End: 2025-06-30

## 2025-06-30 DIAGNOSIS — Z78.0 POSTMENOPAUSAL: Primary | ICD-10-CM

## 2025-08-04 ENCOUNTER — TRANSCRIBE ORDERS (OUTPATIENT)
Facility: HOSPITAL | Age: 75
End: 2025-08-04

## 2025-08-04 DIAGNOSIS — Z12.31 OTHER SCREENING MAMMOGRAM: Primary | ICD-10-CM

## 2025-08-26 ENCOUNTER — HOSPITAL ENCOUNTER (OUTPATIENT)
Facility: HOSPITAL | Age: 75
Discharge: HOME | End: 2025-08-29
Payer: MEDICARE

## 2025-08-26 DIAGNOSIS — Z78.0 POSTMENOPAUSAL: ICD-10-CM

## 2025-08-26 PROCEDURE — 77080 DXA BONE DENSITY AXIAL: CPT
